# Patient Record
Sex: FEMALE | Race: BLACK OR AFRICAN AMERICAN | Employment: OTHER | URBAN - METROPOLITAN AREA
[De-identification: names, ages, dates, MRNs, and addresses within clinical notes are randomized per-mention and may not be internally consistent; named-entity substitution may affect disease eponyms.]

---

## 2018-03-19 ENCOUNTER — HOSPITAL ENCOUNTER (OUTPATIENT)
Dept: INFUSION THERAPY | Age: 73
Discharge: HOME | End: 2018-03-19
Payer: MEDICARE

## 2018-03-19 VITALS
SYSTOLIC BLOOD PRESSURE: 120 MMHG | DIASTOLIC BLOOD PRESSURE: 73 MMHG | RESPIRATION RATE: 18 BRPM | TEMPERATURE: 98.2 F | HEART RATE: 75 BPM

## 2018-03-19 LAB
ALBUMIN SERPL-MCNC: 3.6 G/DL (ref 3.5–5)
ANION GAP SERPL CALC-SCNC: 9 MMOL/L (ref 5–15)
BASOPHILS # BLD: 0 K/UL (ref 0–0.1)
BASOPHILS NFR BLD: 1 % (ref 0–1)
BUN SERPL-MCNC: 60 MG/DL (ref 6–20)
BUN/CREAT SERPL: 13 (ref 12–20)
CALCIUM SERPL-MCNC: 9 MG/DL (ref 8.5–10.1)
CHLORIDE SERPL-SCNC: 105 MMOL/L (ref 97–108)
CO2 SERPL-SCNC: 25 MMOL/L (ref 21–32)
CREAT SERPL-MCNC: 4.71 MG/DL (ref 0.55–1.02)
DIFFERENTIAL METHOD BLD: ABNORMAL
EOSINOPHIL # BLD: 0.2 K/UL (ref 0–0.4)
EOSINOPHIL NFR BLD: 4 % (ref 0–7)
ERYTHROCYTE [DISTWIDTH] IN BLOOD BY AUTOMATED COUNT: 13.2 % (ref 11.5–14.5)
GLUCOSE SERPL-MCNC: 153 MG/DL (ref 65–100)
HCT VFR BLD AUTO: 31.8 % (ref 35–47)
HGB BLD-MCNC: 9.9 G/DL (ref 11.5–16)
IMM GRANULOCYTES # BLD: 0 K/UL (ref 0–0.04)
IMM GRANULOCYTES NFR BLD AUTO: 0 % (ref 0–0.5)
IRON SATN MFR SERPL: 47 % (ref 20–50)
IRON SERPL-MCNC: 116 UG/DL (ref 35–150)
LYMPHOCYTES # BLD: 1.6 K/UL (ref 0.8–3.5)
LYMPHOCYTES NFR BLD: 32 % (ref 12–49)
MCH RBC QN AUTO: 28.5 PG (ref 26–34)
MCHC RBC AUTO-ENTMCNC: 31.1 G/DL (ref 30–36.5)
MCV RBC AUTO: 91.6 FL (ref 80–99)
MONOCYTES # BLD: 0.8 K/UL (ref 0–1)
MONOCYTES NFR BLD: 15 % (ref 5–13)
NEUTS SEG # BLD: 2.4 K/UL (ref 1.8–8)
NEUTS SEG NFR BLD: 48 % (ref 32–75)
NRBC # BLD: 0 K/UL (ref 0–0.01)
NRBC BLD-RTO: 0 PER 100 WBC
PHOSPHATE SERPL-MCNC: 4.1 MG/DL (ref 2.6–4.7)
PLATELET # BLD AUTO: 263 K/UL (ref 150–400)
PMV BLD AUTO: 9.6 FL (ref 8.9–12.9)
POTASSIUM SERPL-SCNC: 5.2 MMOL/L (ref 3.5–5.1)
RBC # BLD AUTO: 3.47 M/UL (ref 3.8–5.2)
SODIUM SERPL-SCNC: 139 MMOL/L (ref 136–145)
TIBC SERPL-MCNC: 247 UG/DL (ref 250–450)
WBC # BLD AUTO: 4.9 K/UL (ref 3.6–11)

## 2018-03-19 PROCEDURE — 83550 IRON BINDING TEST: CPT | Performed by: INTERNAL MEDICINE

## 2018-03-19 PROCEDURE — 96372 THER/PROPH/DIAG INJ SC/IM: CPT

## 2018-03-19 PROCEDURE — 85025 COMPLETE CBC W/AUTO DIFF WBC: CPT | Performed by: INTERNAL MEDICINE

## 2018-03-19 PROCEDURE — 36415 COLL VENOUS BLD VENIPUNCTURE: CPT | Performed by: INTERNAL MEDICINE

## 2018-03-19 PROCEDURE — 80069 RENAL FUNCTION PANEL: CPT | Performed by: INTERNAL MEDICINE

## 2018-03-19 PROCEDURE — 74011250636 HC RX REV CODE- 250/636: Performed by: INTERNAL MEDICINE

## 2018-03-19 RX ADMIN — ERYTHROPOIETIN 20000 UNITS: 20000 INJECTION, SOLUTION INTRAVENOUS; SUBCUTANEOUS at 14:20

## 2018-03-19 NOTE — PROGRESS NOTES
Outpatient Infusion Center Short Visit Progress Note    8691 Pt admit to [facility] for Procrit ambulatory in stable condition. Assessment completed. No new concerns voiced. Patient had last does of procrit at [facility] at the end of January. Patient Vitals for the past 12 hrs:   Temp Pulse Resp BP   03/19/18 1424 - 75 18 120/73   03/19/18 1325 98.2 °F (36.8 °C) 66 18 106/53     Recent Results (from the past 12 hour(s))   CBC WITH AUTOMATED DIFF    Collection Time: 03/19/18  1:23 PM   Result Value Ref Range    WBC 4.9 3.6 - 11.0 K/uL    RBC 3.47 (L) 3.80 - 5.20 M/uL    HGB 9.9 (L) 11.5 - 16.0 g/dL    HCT 31.8 (L) 35.0 - 47.0 %    MCV 91.6 80.0 - 99.0 FL    MCH 28.5 26.0 - 34.0 PG    MCHC 31.1 30.0 - 36.5 g/dL    RDW 13.2 11.5 - 14.5 %    PLATELET 986 336 - 126 K/uL    MPV 9.6 8.9 - 12.9 FL    NRBC 0.0 0  WBC    ABSOLUTE NRBC 0.00 0.00 - 0.01 K/uL    NEUTROPHILS 48 32 - 75 %    LYMPHOCYTES 32 12 - 49 %    MONOCYTES 15 (H) 5 - 13 %    EOSINOPHILS 4 0 - 7 %    BASOPHILS 1 0 - 1 %    IMMATURE GRANULOCYTES 0 0.0 - 0.5 %    ABS. NEUTROPHILS 2.4 1.8 - 8.0 K/UL    ABS. LYMPHOCYTES 1.6 0.8 - 3.5 K/UL    ABS. MONOCYTES 0.8 0.0 - 1.0 K/UL    ABS. EOSINOPHILS 0.2 0.0 - 0.4 K/UL    ABS. BASOPHILS 0.0 0.0 - 0.1 K/UL    ABS. IMM. GRANS. 0.0 0.00 - 0.04 K/UL    DF AUTOMATED     RENAL FUNCTION PANEL    Collection Time: 03/19/18  1:23 PM   Result Value Ref Range    Sodium 139 136 - 145 mmol/L    Potassium 5.2 (H) 3.5 - 5.1 mmol/L    Chloride 105 97 - 108 mmol/L    CO2 25 21 - 32 mmol/L    Anion gap 9 5 - 15 mmol/L    Glucose 153 (H) 65 - 100 mg/dL    BUN 60 (H) 6 - 20 MG/DL    Creatinine 4.71 (H) 0.55 - 1.02 MG/DL    BUN/Creatinine ratio 13 12 - 20      GFR est AA 11 (L) >60 ml/min/1.73m2    GFR est non-AA 9 (L) >60 ml/min/1.73m2    Calcium 9.0 8.5 - 10.1 MG/DL    Phosphorus 4.1 2.6 - 4.7 MG/DL    Albumin 3.6 3.5 - 5.0 g/dL     R arm with positive blood return.      Medications:  Procrit SQ hgb hct within treatment parameters    1425 Pt tolerated treatment well. D/c home ambulatory in no distress. Pt aware of next appointment scheduled for 4/23/18 at 1500.     Shannon Avalos RN

## 2018-04-02 ENCOUNTER — APPOINTMENT (OUTPATIENT)
Dept: INFUSION THERAPY | Age: 73
End: 2018-04-02
Payer: MEDICARE

## 2018-04-16 ENCOUNTER — APPOINTMENT (OUTPATIENT)
Dept: INFUSION THERAPY | Age: 73
End: 2018-04-16
Payer: MEDICARE

## 2018-04-30 ENCOUNTER — HOSPITAL ENCOUNTER (OUTPATIENT)
Dept: INFUSION THERAPY | Age: 73
Discharge: HOME | End: 2018-04-30
Payer: MEDICARE

## 2018-04-30 ENCOUNTER — APPOINTMENT (OUTPATIENT)
Dept: INFUSION THERAPY | Age: 73
End: 2018-04-30
Payer: MEDICARE

## 2018-04-30 VITALS
HEART RATE: 75 BPM | TEMPERATURE: 97.5 F | DIASTOLIC BLOOD PRESSURE: 81 MMHG | SYSTOLIC BLOOD PRESSURE: 119 MMHG | RESPIRATION RATE: 18 BRPM

## 2018-04-30 LAB
ALBUMIN SERPL-MCNC: 3.5 G/DL (ref 3.5–5)
ANION GAP SERPL CALC-SCNC: 7 MMOL/L (ref 5–15)
BASOPHILS # BLD: 0 K/UL (ref 0–0.1)
BASOPHILS NFR BLD: 1 % (ref 0–1)
BUN SERPL-MCNC: 43 MG/DL (ref 6–20)
BUN/CREAT SERPL: 12 (ref 12–20)
CALCIUM SERPL-MCNC: 9.1 MG/DL (ref 8.5–10.1)
CHLORIDE SERPL-SCNC: 106 MMOL/L (ref 97–108)
CO2 SERPL-SCNC: 25 MMOL/L (ref 21–32)
CREAT SERPL-MCNC: 3.71 MG/DL (ref 0.55–1.02)
DIFFERENTIAL METHOD BLD: ABNORMAL
EOSINOPHIL # BLD: 0.4 K/UL (ref 0–0.4)
EOSINOPHIL NFR BLD: 8 % (ref 0–7)
ERYTHROCYTE [DISTWIDTH] IN BLOOD BY AUTOMATED COUNT: 13.6 % (ref 11.5–14.5)
GLUCOSE SERPL-MCNC: 89 MG/DL (ref 65–100)
HCT VFR BLD AUTO: 30.4 % (ref 35–47)
HGB BLD-MCNC: 9.4 G/DL (ref 11.5–16)
IMM GRANULOCYTES # BLD: 0 K/UL (ref 0–0.04)
IMM GRANULOCYTES NFR BLD AUTO: 0 % (ref 0–0.5)
IRON SATN MFR SERPL: 46 % (ref 20–50)
IRON SERPL-MCNC: 110 UG/DL (ref 35–150)
LYMPHOCYTES # BLD: 1.3 K/UL (ref 0.8–3.5)
LYMPHOCYTES NFR BLD: 28 % (ref 12–49)
MCH RBC QN AUTO: 28.3 PG (ref 26–34)
MCHC RBC AUTO-ENTMCNC: 30.9 G/DL (ref 30–36.5)
MCV RBC AUTO: 91.6 FL (ref 80–99)
MONOCYTES # BLD: 0.6 K/UL (ref 0–1)
MONOCYTES NFR BLD: 13 % (ref 5–13)
NEUTS SEG # BLD: 2.4 K/UL (ref 1.8–8)
NEUTS SEG NFR BLD: 50 % (ref 32–75)
NRBC # BLD: 0 K/UL (ref 0–0.01)
NRBC BLD-RTO: 0 PER 100 WBC
PHOSPHATE SERPL-MCNC: 4 MG/DL (ref 2.6–4.7)
PLATELET # BLD AUTO: 258 K/UL (ref 150–400)
PMV BLD AUTO: 10 FL (ref 8.9–12.9)
POTASSIUM SERPL-SCNC: 5.4 MMOL/L (ref 3.5–5.1)
RBC # BLD AUTO: 3.32 M/UL (ref 3.8–5.2)
SODIUM SERPL-SCNC: 138 MMOL/L (ref 136–145)
TIBC SERPL-MCNC: 240 UG/DL (ref 250–450)
WBC # BLD AUTO: 4.8 K/UL (ref 3.6–11)

## 2018-04-30 PROCEDURE — 74011250636 HC RX REV CODE- 250/636: Performed by: INTERNAL MEDICINE

## 2018-04-30 PROCEDURE — 83540 ASSAY OF IRON: CPT | Performed by: INTERNAL MEDICINE

## 2018-04-30 PROCEDURE — 80069 RENAL FUNCTION PANEL: CPT | Performed by: INTERNAL MEDICINE

## 2018-04-30 PROCEDURE — 96372 THER/PROPH/DIAG INJ SC/IM: CPT

## 2018-04-30 PROCEDURE — 36415 COLL VENOUS BLD VENIPUNCTURE: CPT | Performed by: INTERNAL MEDICINE

## 2018-04-30 PROCEDURE — 85025 COMPLETE CBC W/AUTO DIFF WBC: CPT | Performed by: INTERNAL MEDICINE

## 2018-04-30 RX ORDER — LOSARTAN POTASSIUM 100 MG/1
100 TABLET ORAL DAILY
COMMUNITY

## 2018-04-30 RX ORDER — CLONAZEPAM 0.5 MG/1
0.5 TABLET ORAL
COMMUNITY

## 2018-04-30 RX ORDER — CHOLECALCIFEROL (VITAMIN D3) 125 MCG
CAPSULE ORAL
COMMUNITY

## 2018-04-30 RX ORDER — CYCLOBENZAPRINE HCL 5 MG
5 TABLET ORAL 2 TIMES DAILY
COMMUNITY

## 2018-04-30 RX ORDER — DICLOFENAC SODIUM 10 MG/G
GEL TOPICAL 4 TIMES DAILY
COMMUNITY

## 2018-04-30 RX ORDER — DULOXETIN HYDROCHLORIDE 60 MG/1
60 CAPSULE, DELAYED RELEASE ORAL DAILY
COMMUNITY

## 2018-04-30 RX ORDER — EPLERENONE 25 MG/1
25 TABLET, FILM COATED ORAL DAILY
COMMUNITY

## 2018-04-30 RX ORDER — AMLODIPINE BESYLATE 10 MG/1
10 TABLET ORAL DAILY
COMMUNITY

## 2018-04-30 RX ORDER — INSULIN ASPART 100 [IU]/ML
INJECTION, SOLUTION INTRAVENOUS; SUBCUTANEOUS
COMMUNITY

## 2018-04-30 RX ORDER — LANOLIN ALCOHOL/MO/W.PET/CERES
CREAM (GRAM) TOPICAL
COMMUNITY

## 2018-04-30 RX ORDER — HYDROCODONE BITARTRATE AND ACETAMINOPHEN 7.5; 325 MG/1; MG/1
TABLET ORAL
COMMUNITY

## 2018-04-30 RX ORDER — FUROSEMIDE 20 MG/1
TABLET ORAL DAILY
COMMUNITY

## 2018-04-30 RX ORDER — GABAPENTIN 100 MG/1
100 CAPSULE ORAL 2 TIMES DAILY
COMMUNITY

## 2018-04-30 RX ORDER — CARVEDILOL 25 MG/1
25 TABLET ORAL 2 TIMES DAILY
COMMUNITY

## 2018-04-30 RX ADMIN — ERYTHROPOIETIN 25000 UNITS: 40000 INJECTION, SOLUTION INTRAVENOUS; SUBCUTANEOUS at 16:59

## 2018-04-30 NOTE — PROGRESS NOTES
JAG received from [facility]. Procrit received from pharmacy. Reviewed labs. Procrit given in Left Arm - SQ. Patient tolerated treatment and was discharged in no distress at 1700.

## 2018-04-30 NOTE — PROGRESS NOTES
Outpatient Infusion Center Short Visit Progress Note    0151 Pt admit to [address] [place] [address] for Procrit SQ ambulatory in stable condition. Assessment completed. No new concerns voiced. Visit Vitals    /81    Pulse 75    Temp 97.5 °F (36.4 °C)    Resp 18     R Ac with positive blood return labs drawn and in process. Medications:  Ordered from pharmacy    [facility] Pt aware of next appointment scheduled for 5/29/18 at 1500.     [facility], RN

## 2018-05-14 ENCOUNTER — APPOINTMENT (OUTPATIENT)
Dept: INFUSION THERAPY | Age: 73
End: 2018-05-14
Payer: MEDICARE

## 2018-05-21 ENCOUNTER — APPOINTMENT (OUTPATIENT)
Dept: INFUSION THERAPY | Age: 73
End: 2018-05-21
Payer: MEDICARE

## 2018-05-29 ENCOUNTER — HOSPITAL ENCOUNTER (OUTPATIENT)
Dept: INFUSION THERAPY | Age: 73
Discharge: HOME | End: 2018-05-29
Payer: MEDICARE

## 2018-05-29 VITALS
SYSTOLIC BLOOD PRESSURE: 144 MMHG | TEMPERATURE: 97.5 F | RESPIRATION RATE: 18 BRPM | HEART RATE: 66 BPM | DIASTOLIC BLOOD PRESSURE: 80 MMHG

## 2018-05-29 LAB
ALBUMIN SERPL-MCNC: 3.6 G/DL (ref 3.5–5)
ANION GAP SERPL CALC-SCNC: 10 MMOL/L (ref 5–15)
BASOPHILS # BLD: 0 K/UL (ref 0–0.1)
BASOPHILS NFR BLD: 1 % (ref 0–1)
BUN SERPL-MCNC: 46 MG/DL (ref 6–20)
BUN/CREAT SERPL: 13 (ref 12–20)
CALCIUM SERPL-MCNC: 8.7 MG/DL (ref 8.5–10.1)
CALCIUM SERPL-MCNC: 9.2 MG/DL (ref 8.5–10.1)
CHLORIDE SERPL-SCNC: 104 MMOL/L (ref 97–108)
CO2 SERPL-SCNC: 25 MMOL/L (ref 21–32)
CREAT SERPL-MCNC: 3.65 MG/DL (ref 0.55–1.02)
DIFFERENTIAL METHOD BLD: ABNORMAL
EOSINOPHIL # BLD: 0.2 K/UL (ref 0–0.4)
EOSINOPHIL NFR BLD: 5 % (ref 0–7)
ERYTHROCYTE [DISTWIDTH] IN BLOOD BY AUTOMATED COUNT: 13.2 % (ref 11.5–14.5)
FERRITIN SERPL-MCNC: 271 NG/ML (ref 8–252)
GLUCOSE SERPL-MCNC: 149 MG/DL (ref 65–100)
HCT VFR BLD AUTO: 31.6 % (ref 35–47)
HGB BLD-MCNC: 9.6 G/DL (ref 11.5–16)
IMM GRANULOCYTES # BLD: 0 K/UL (ref 0–0.04)
IMM GRANULOCYTES NFR BLD AUTO: 0 % (ref 0–0.5)
IRON SATN MFR SERPL: 36 % (ref 20–50)
IRON SERPL-MCNC: 83 UG/DL (ref 35–150)
LYMPHOCYTES # BLD: 1.3 K/UL (ref 0.8–3.5)
LYMPHOCYTES NFR BLD: 28 % (ref 12–49)
MCH RBC QN AUTO: 28.5 PG (ref 26–34)
MCHC RBC AUTO-ENTMCNC: 30.4 G/DL (ref 30–36.5)
MCV RBC AUTO: 93.8 FL (ref 80–99)
MONOCYTES # BLD: 0.6 K/UL (ref 0–1)
MONOCYTES NFR BLD: 12 % (ref 5–13)
NEUTS SEG # BLD: 2.6 K/UL (ref 1.8–8)
NEUTS SEG NFR BLD: 54 % (ref 32–75)
NRBC # BLD: 0 K/UL (ref 0–0.01)
NRBC BLD-RTO: 0 PER 100 WBC
PHOSPHATE SERPL-MCNC: 3.4 MG/DL (ref 2.6–4.7)
PLATELET # BLD AUTO: 270 K/UL (ref 150–400)
PMV BLD AUTO: 9.7 FL (ref 8.9–12.9)
POTASSIUM SERPL-SCNC: 4.8 MMOL/L (ref 3.5–5.1)
PTH-INTACT SERPL-MCNC: 124.2 PG/ML (ref 18.4–88)
RBC # BLD AUTO: 3.37 M/UL (ref 3.8–5.2)
SODIUM SERPL-SCNC: 139 MMOL/L (ref 136–145)
TIBC SERPL-MCNC: 231 UG/DL (ref 250–450)
WBC # BLD AUTO: 4.7 K/UL (ref 3.6–11)

## 2018-05-29 PROCEDURE — 83540 ASSAY OF IRON: CPT | Performed by: INTERNAL MEDICINE

## 2018-05-29 PROCEDURE — 96372 THER/PROPH/DIAG INJ SC/IM: CPT

## 2018-05-29 PROCEDURE — 83970 ASSAY OF PARATHORMONE: CPT | Performed by: INTERNAL MEDICINE

## 2018-05-29 PROCEDURE — 74011250636 HC RX REV CODE- 250/636: Performed by: INTERNAL MEDICINE

## 2018-05-29 PROCEDURE — 85025 COMPLETE CBC W/AUTO DIFF WBC: CPT | Performed by: INTERNAL MEDICINE

## 2018-05-29 PROCEDURE — 82728 ASSAY OF FERRITIN: CPT | Performed by: INTERNAL MEDICINE

## 2018-05-29 PROCEDURE — 36415 COLL VENOUS BLD VENIPUNCTURE: CPT | Performed by: INTERNAL MEDICINE

## 2018-05-29 PROCEDURE — 80069 RENAL FUNCTION PANEL: CPT | Performed by: INTERNAL MEDICINE

## 2018-05-29 RX ADMIN — ERYTHROPOIETIN 25000 UNITS: 40000 INJECTION, SOLUTION INTRAVENOUS; SUBCUTANEOUS at 16:50

## 2018-05-29 NOTE — PROGRESS NOTES
Problem: Patient Education:  Go to Education Activity  Goal: Patient/Family Education  Outcome: Progressing Towards Goal  Pt here for Procrit

## 2018-05-29 NOTE — PROGRESS NOTES
[facility] VISIT NOTE    Pt arrived at [facility] ambulatory with walker and in no distress for Procrit. Assessment completed, pt c/o right shoulder pain . Patient Vitals for the past 12 hrs:   Temp Pulse Resp BP   05/29/18 1515 97.5 °F (36.4 °C) 66 18 144/80     Labs drawn from right ac without difficulty, and  within treatment parameters. Recent Results (from the past 12 hour(s))   CBC WITH AUTOMATED DIFF    Collection Time: 05/29/18  3:30 PM   Result Value Ref Range    WBC 4.7 3.6 - 11.0 K/uL    RBC 3.37 (L) 3.80 - 5.20 M/uL    HGB 9.6 (L) 11.5 - 16.0 g/dL    HCT 31.6 (L) 35.0 - 47.0 %    MCV 93.8 80.0 - 99.0 FL    MCH 28.5 26.0 - 34.0 PG    MCHC 30.4 30.0 - 36.5 g/dL    RDW 13.2 11.5 - 14.5 %    PLATELET 932 289 - 910 K/uL    MPV 9.7 8.9 - 12.9 FL    NRBC 0.0 0  WBC    ABSOLUTE NRBC 0.00 0.00 - 0.01 K/uL    NEUTROPHILS 54 32 - 75 %    LYMPHOCYTES 28 12 - 49 %    MONOCYTES 12 5 - 13 %    EOSINOPHILS 5 0 - 7 %    BASOPHILS 1 0 - 1 %    IMMATURE GRANULOCYTES 0 0.0 - 0.5 %    ABS. NEUTROPHILS 2.6 1.8 - 8.0 K/UL    ABS. LYMPHOCYTES 1.3 0.8 - 3.5 K/UL    ABS. MONOCYTES 0.6 0.0 - 1.0 K/UL    ABS. EOSINOPHILS 0.2 0.0 - 0.4 K/UL    ABS. BASOPHILS 0.0 0.0 - 0.1 K/UL    ABS. IMM. GRANS. 0.0 0.00 - 0.04 K/UL    DF AUTOMATED     RENAL FUNCTION PANEL    Collection Time: 05/29/18  3:30 PM   Result Value Ref Range    Sodium 139 136 - 145 mmol/L    Potassium 4.8 3.5 - 5.1 mmol/L    Chloride 104 97 - 108 mmol/L    CO2 25 21 - 32 mmol/L    Anion gap 10 5 - 15 mmol/L    Glucose 149 (H) 65 - 100 mg/dL    BUN 46 (H) 6 - 20 MG/DL    Creatinine 3.65 (H) 0.55 - 1.02 MG/DL    BUN/Creatinine ratio 13 12 - 20      GFR est AA 15 (L) >60 ml/min/1.73m2    GFR est non-AA 12 (L) >60 ml/min/1.73m2    Calcium 9.2 8.5 - 10.1 MG/DL    Phosphorus 3.4 2.6 - 4.7 MG/DL    Albumin 3.6 3.5 - 5.0 g/dL     Medications received:  Procrit SQ (left arm)    Tolerated treatment well, no adverse reaction noted.      D/C'd from [facility] ambulatory with walker and in no distress. Next appointment is 6/25/18 at 3:00.

## 2018-06-11 ENCOUNTER — APPOINTMENT (OUTPATIENT)
Dept: INFUSION THERAPY | Age: 73
End: 2018-06-11
Payer: MEDICARE

## 2018-06-18 ENCOUNTER — APPOINTMENT (OUTPATIENT)
Dept: INFUSION THERAPY | Age: 73
End: 2018-06-18
Payer: MEDICARE

## 2018-06-25 ENCOUNTER — HOSPITAL ENCOUNTER (OUTPATIENT)
Dept: INFUSION THERAPY | Age: 73
Discharge: HOME | End: 2018-06-25
Payer: MEDICARE

## 2018-06-25 VITALS
TEMPERATURE: 97.9 F | HEART RATE: 81 BPM | DIASTOLIC BLOOD PRESSURE: 86 MMHG | RESPIRATION RATE: 18 BRPM | SYSTOLIC BLOOD PRESSURE: 151 MMHG

## 2018-06-25 LAB
ALBUMIN SERPL-MCNC: 3.4 G/DL (ref 3.5–5)
ANION GAP SERPL CALC-SCNC: 10 MMOL/L (ref 5–15)
BASOPHILS # BLD: 0 K/UL (ref 0–0.1)
BASOPHILS NFR BLD: 1 % (ref 0–1)
BUN SERPL-MCNC: 55 MG/DL (ref 6–20)
BUN/CREAT SERPL: 13 (ref 12–20)
CALCIUM SERPL-MCNC: 8.6 MG/DL (ref 8.5–10.1)
CHLORIDE SERPL-SCNC: 105 MMOL/L (ref 97–108)
CO2 SERPL-SCNC: 23 MMOL/L (ref 21–32)
CREAT SERPL-MCNC: 4.36 MG/DL (ref 0.55–1.02)
DIFFERENTIAL METHOD BLD: ABNORMAL
EOSINOPHIL # BLD: 0.2 K/UL (ref 0–0.4)
EOSINOPHIL NFR BLD: 4 % (ref 0–7)
ERYTHROCYTE [DISTWIDTH] IN BLOOD BY AUTOMATED COUNT: 13.2 % (ref 11.5–14.5)
GLUCOSE SERPL-MCNC: 154 MG/DL (ref 65–100)
HCT VFR BLD AUTO: 31.3 % (ref 35–47)
HGB BLD-MCNC: 9.6 G/DL (ref 11.5–16)
IMM GRANULOCYTES # BLD: 0 K/UL (ref 0–0.04)
IMM GRANULOCYTES NFR BLD AUTO: 0 % (ref 0–0.5)
IRON SATN MFR SERPL: 38 % (ref 20–50)
IRON SERPL-MCNC: 83 UG/DL (ref 35–150)
LYMPHOCYTES # BLD: 1.6 K/UL (ref 0.8–3.5)
LYMPHOCYTES NFR BLD: 30 % (ref 12–49)
MCH RBC QN AUTO: 28.7 PG (ref 26–34)
MCHC RBC AUTO-ENTMCNC: 30.7 G/DL (ref 30–36.5)
MCV RBC AUTO: 93.4 FL (ref 80–99)
MONOCYTES # BLD: 0.6 K/UL (ref 0–1)
MONOCYTES NFR BLD: 11 % (ref 5–13)
NEUTS SEG # BLD: 2.8 K/UL (ref 1.8–8)
NEUTS SEG NFR BLD: 54 % (ref 32–75)
NRBC # BLD: 0 K/UL (ref 0–0.01)
NRBC BLD-RTO: 0 PER 100 WBC
PHOSPHATE SERPL-MCNC: 4.4 MG/DL (ref 2.6–4.7)
PLATELET # BLD AUTO: 270 K/UL (ref 150–400)
PMV BLD AUTO: 9.6 FL (ref 8.9–12.9)
POTASSIUM SERPL-SCNC: 5.5 MMOL/L (ref 3.5–5.1)
RBC # BLD AUTO: 3.35 M/UL (ref 3.8–5.2)
SODIUM SERPL-SCNC: 138 MMOL/L (ref 136–145)
TIBC SERPL-MCNC: 217 UG/DL (ref 250–450)
WBC # BLD AUTO: 5.3 K/UL (ref 3.6–11)

## 2018-06-25 PROCEDURE — 83540 ASSAY OF IRON: CPT | Performed by: INTERNAL MEDICINE

## 2018-06-25 PROCEDURE — 85025 COMPLETE CBC W/AUTO DIFF WBC: CPT | Performed by: INTERNAL MEDICINE

## 2018-06-25 PROCEDURE — 80069 RENAL FUNCTION PANEL: CPT | Performed by: INTERNAL MEDICINE

## 2018-06-25 PROCEDURE — 74011250636 HC RX REV CODE- 250/636: Performed by: INTERNAL MEDICINE

## 2018-06-25 PROCEDURE — 36415 COLL VENOUS BLD VENIPUNCTURE: CPT | Performed by: INTERNAL MEDICINE

## 2018-06-25 PROCEDURE — 96372 THER/PROPH/DIAG INJ SC/IM: CPT

## 2018-06-25 PROCEDURE — 74011250636 HC RX REV CODE- 250/636

## 2018-06-25 RX ADMIN — ERYTHROPOIETIN 25000 UNITS: 40000 INJECTION, SOLUTION INTRAVENOUS; SUBCUTANEOUS at 16:05

## 2018-06-25 NOTE — PROGRESS NOTES
[facility] SHORT VISIT NOTE    1520 Pt arrived to [facility] ambulatory and in no distress for Procrit. Denies any new complaints. Medication given:  Procrit SC (left arm)  Patient Vitals for the past 12 hrs:   Temp Pulse Resp BP   06/25/18 1521 97.9 °F (36.6 °C) 81 18 151/86     1610 Discharged home ambulatory and in no distress. Tolerated treatment well.  Next appointment 7/23/18 @ 1500

## 2018-06-25 NOTE — PROGRESS NOTES
Problem: Patient Education:  Go to Education Activity  Goal: Patient/Family Education  Outcome: Progressing Towards Goal  Pt receiving Procrit

## 2018-07-09 ENCOUNTER — APPOINTMENT (OUTPATIENT)
Dept: INFUSION THERAPY | Age: 73
End: 2018-07-09
Payer: MEDICARE

## 2018-07-16 ENCOUNTER — APPOINTMENT (OUTPATIENT)
Dept: INFUSION THERAPY | Age: 73
End: 2018-07-16
Payer: MEDICARE

## 2018-07-23 ENCOUNTER — HOSPITAL ENCOUNTER (OUTPATIENT)
Dept: INFUSION THERAPY | Age: 73
Discharge: HOME | End: 2018-07-23
Payer: MEDICARE

## 2018-07-23 VITALS
SYSTOLIC BLOOD PRESSURE: 151 MMHG | DIASTOLIC BLOOD PRESSURE: 76 MMHG | HEART RATE: 79 BPM | RESPIRATION RATE: 18 BRPM | TEMPERATURE: 97.2 F

## 2018-07-23 LAB
ALBUMIN SERPL-MCNC: 3.6 G/DL (ref 3.5–5)
ANION GAP SERPL CALC-SCNC: 7 MMOL/L (ref 5–15)
BASOPHILS # BLD: 0 K/UL (ref 0–0.1)
BASOPHILS NFR BLD: 1 % (ref 0–1)
BUN SERPL-MCNC: 43 MG/DL (ref 6–20)
BUN/CREAT SERPL: 11 (ref 12–20)
CALCIUM SERPL-MCNC: 8.9 MG/DL (ref 8.5–10.1)
CHLORIDE SERPL-SCNC: 107 MMOL/L (ref 97–108)
CO2 SERPL-SCNC: 25 MMOL/L (ref 21–32)
CREAT SERPL-MCNC: 3.92 MG/DL (ref 0.55–1.02)
DIFFERENTIAL METHOD BLD: ABNORMAL
EOSINOPHIL # BLD: 0.3 K/UL (ref 0–0.4)
EOSINOPHIL NFR BLD: 5 % (ref 0–7)
ERYTHROCYTE [DISTWIDTH] IN BLOOD BY AUTOMATED COUNT: 13 % (ref 11.5–14.5)
GLUCOSE SERPL-MCNC: 122 MG/DL (ref 65–100)
HCT VFR BLD AUTO: 33.3 % (ref 35–47)
HGB BLD-MCNC: 10.3 G/DL (ref 11.5–16)
IMM GRANULOCYTES # BLD: 0 K/UL (ref 0–0.04)
IMM GRANULOCYTES NFR BLD AUTO: 0 % (ref 0–0.5)
IRON SATN MFR SERPL: 50 % (ref 20–50)
IRON SERPL-MCNC: 114 UG/DL (ref 35–150)
LYMPHOCYTES # BLD: 1.4 K/UL (ref 0.8–3.5)
LYMPHOCYTES NFR BLD: 30 % (ref 12–49)
MCH RBC QN AUTO: 28.5 PG (ref 26–34)
MCHC RBC AUTO-ENTMCNC: 30.9 G/DL (ref 30–36.5)
MCV RBC AUTO: 92.2 FL (ref 80–99)
MONOCYTES # BLD: 0.5 K/UL (ref 0–1)
MONOCYTES NFR BLD: 11 % (ref 5–13)
NEUTS SEG # BLD: 2.5 K/UL (ref 1.8–8)
NEUTS SEG NFR BLD: 53 % (ref 32–75)
NRBC # BLD: 0 K/UL (ref 0–0.01)
NRBC BLD-RTO: 0 PER 100 WBC
PHOSPHATE SERPL-MCNC: 4.1 MG/DL (ref 2.6–4.7)
PLATELET # BLD AUTO: 255 K/UL (ref 150–400)
PMV BLD AUTO: 9.7 FL (ref 8.9–12.9)
POTASSIUM SERPL-SCNC: 4.7 MMOL/L (ref 3.5–5.1)
RBC # BLD AUTO: 3.61 M/UL (ref 3.8–5.2)
SODIUM SERPL-SCNC: 139 MMOL/L (ref 136–145)
TIBC SERPL-MCNC: 228 UG/DL (ref 250–450)
WBC # BLD AUTO: 4.7 K/UL (ref 3.6–11)

## 2018-07-23 PROCEDURE — 85025 COMPLETE CBC W/AUTO DIFF WBC: CPT | Performed by: INTERNAL MEDICINE

## 2018-07-23 PROCEDURE — 36415 COLL VENOUS BLD VENIPUNCTURE: CPT | Performed by: INTERNAL MEDICINE

## 2018-07-23 PROCEDURE — 80069 RENAL FUNCTION PANEL: CPT | Performed by: INTERNAL MEDICINE

## 2018-07-23 PROCEDURE — 83540 ASSAY OF IRON: CPT | Performed by: INTERNAL MEDICINE

## 2018-07-23 NOTE — PROGRESS NOTES
[facility] VISIT NOTE    1450  Pt arrived at [facility] ambulatory and in no distress for Procrit. Assessment completed, pt c/o back pain and headaches. Blood pressure 151/76, pulse 79, temperature 97.2 °F (36.2 °C), resp. rate 18, not currently breastfeeding. Labs drawn peripherally. Procrit HELD today. Recent Results (from the past 12 hour(s))   CBC WITH AUTOMATED DIFF    Collection Time: 07/23/18  2:54 PM   Result Value Ref Range    WBC 4.7 3.6 - 11.0 K/uL    RBC 3.61 (L) 3.80 - 5.20 M/uL    HGB 10.3 (L) 11.5 - 16.0 g/dL    HCT 33.3 (L) 35.0 - 47.0 %    MCV 92.2 80.0 - 99.0 FL    MCH 28.5 26.0 - 34.0 PG    MCHC 30.9 30.0 - 36.5 g/dL    RDW 13.0 11.5 - 14.5 %    PLATELET 375 496 - 043 K/uL    MPV 9.7 8.9 - 12.9 FL    NRBC 0.0 0  WBC    ABSOLUTE NRBC 0.00 0.00 - 0.01 K/uL    NEUTROPHILS 53 32 - 75 %    LYMPHOCYTES 30 12 - 49 %    MONOCYTES 11 5 - 13 %    EOSINOPHILS 5 0 - 7 %    BASOPHILS 1 0 - 1 %    IMMATURE GRANULOCYTES 0 0.0 - 0.5 %    ABS. NEUTROPHILS 2.5 1.8 - 8.0 K/UL    ABS. LYMPHOCYTES 1.4 0.8 - 3.5 K/UL    ABS. MONOCYTES 0.5 0.0 - 1.0 K/UL    ABS. EOSINOPHILS 0.3 0.0 - 0.4 K/UL    ABS. BASOPHILS 0.0 0.0 - 0.1 K/UL    ABS. IMM. GRANS. 0.0 0.00 - 0.04 K/UL    DF AUTOMATED     RENAL FUNCTION PANEL    Collection Time: 07/23/18  2:54 PM   Result Value Ref Range    Sodium 139 136 - 145 mmol/L    Potassium 4.7 3.5 - 5.1 mmol/L    Chloride 107 97 - 108 mmol/L    CO2 25 21 - 32 mmol/L    Anion gap 7 5 - 15 mmol/L    Glucose 122 (H) 65 - 100 mg/dL    BUN 43 (H) 6 - 20 MG/DL    Creatinine 3.92 (H) 0.55 - 1.02 MG/DL    BUN/Creatinine ratio 11 (L) 12 - 20      GFR est AA 14 (L) >60 ml/min/1.73m2    GFR est non-AA 11 (L) >60 ml/min/1.73m2    Calcium 8.9 8.5 - 10.1 MG/DL    Phosphorus 4.1 2.6 - 4.7 MG/DL    Albumin 3.6 3.5 - 5.0 g/dL     Tolerated treatment well, no adverse reaction noted. 1550  D/C'd from [facility] ambulatory and in no distress. Next appointment is 8/20/18 at 1500.

## 2018-08-20 ENCOUNTER — HOSPITAL ENCOUNTER (OUTPATIENT)
Dept: INFUSION THERAPY | Age: 73
Discharge: HOME | End: 2018-08-20
Payer: MEDICARE

## 2018-08-20 VITALS
WEIGHT: 233 LBS | SYSTOLIC BLOOD PRESSURE: 138 MMHG | HEART RATE: 79 BPM | DIASTOLIC BLOOD PRESSURE: 78 MMHG | RESPIRATION RATE: 18 BRPM | TEMPERATURE: 98.8 F

## 2018-08-20 LAB
ALBUMIN SERPL-MCNC: 3.5 G/DL (ref 3.5–5)
ANION GAP SERPL CALC-SCNC: 11 MMOL/L (ref 5–15)
BASOPHILS # BLD: 0 K/UL (ref 0–0.1)
BASOPHILS NFR BLD: 1 % (ref 0–1)
BUN SERPL-MCNC: 54 MG/DL (ref 6–20)
BUN/CREAT SERPL: 10 (ref 12–20)
CALCIUM SERPL-MCNC: 9.1 MG/DL (ref 8.5–10.1)
CHLORIDE SERPL-SCNC: 105 MMOL/L (ref 97–108)
CO2 SERPL-SCNC: 21 MMOL/L (ref 21–32)
CREAT SERPL-MCNC: 5.15 MG/DL (ref 0.55–1.02)
DIFFERENTIAL METHOD BLD: ABNORMAL
EOSINOPHIL # BLD: 0.2 K/UL (ref 0–0.4)
EOSINOPHIL NFR BLD: 5 % (ref 0–7)
ERYTHROCYTE [DISTWIDTH] IN BLOOD BY AUTOMATED COUNT: 13.1 % (ref 11.5–14.5)
GLUCOSE SERPL-MCNC: 158 MG/DL (ref 65–100)
HCT VFR BLD AUTO: 28.6 % (ref 35–47)
HGB BLD-MCNC: 9 G/DL (ref 11.5–16)
IMM GRANULOCYTES # BLD: 0 K/UL (ref 0–0.04)
IMM GRANULOCYTES NFR BLD AUTO: 0 % (ref 0–0.5)
IRON SATN MFR SERPL: 40 % (ref 20–50)
IRON SERPL-MCNC: 92 UG/DL (ref 35–150)
LYMPHOCYTES # BLD: 1.2 K/UL (ref 0.8–3.5)
LYMPHOCYTES NFR BLD: 30 % (ref 12–49)
MCH RBC QN AUTO: 28.7 PG (ref 26–34)
MCHC RBC AUTO-ENTMCNC: 31.5 G/DL (ref 30–36.5)
MCV RBC AUTO: 91.1 FL (ref 80–99)
MONOCYTES # BLD: 0.7 K/UL (ref 0–1)
MONOCYTES NFR BLD: 16 % (ref 5–13)
NEUTS SEG # BLD: 2 K/UL (ref 1.8–8)
NEUTS SEG NFR BLD: 49 % (ref 32–75)
NRBC # BLD: 0 K/UL (ref 0–0.01)
NRBC BLD-RTO: 0 PER 100 WBC
PHOSPHATE SERPL-MCNC: 4.1 MG/DL (ref 2.6–4.7)
PLATELET # BLD AUTO: 216 K/UL (ref 150–400)
PMV BLD AUTO: 9.9 FL (ref 8.9–12.9)
POTASSIUM SERPL-SCNC: 5.1 MMOL/L (ref 3.5–5.1)
RBC # BLD AUTO: 3.14 M/UL (ref 3.8–5.2)
SODIUM SERPL-SCNC: 137 MMOL/L (ref 136–145)
TIBC SERPL-MCNC: 229 UG/DL (ref 250–450)
WBC # BLD AUTO: 4 K/UL (ref 3.6–11)

## 2018-08-20 PROCEDURE — 85025 COMPLETE CBC W/AUTO DIFF WBC: CPT | Performed by: INTERNAL MEDICINE

## 2018-08-20 PROCEDURE — 96372 THER/PROPH/DIAG INJ SC/IM: CPT

## 2018-08-20 PROCEDURE — 80069 RENAL FUNCTION PANEL: CPT | Performed by: INTERNAL MEDICINE

## 2018-08-20 PROCEDURE — 83540 ASSAY OF IRON: CPT | Performed by: INTERNAL MEDICINE

## 2018-08-20 PROCEDURE — 36415 COLL VENOUS BLD VENIPUNCTURE: CPT | Performed by: INTERNAL MEDICINE

## 2018-08-20 PROCEDURE — 74011250636 HC RX REV CODE- 250/636: Performed by: INTERNAL MEDICINE

## 2018-08-20 RX ADMIN — ERYTHROPOIETIN 25000 UNITS: 40000 INJECTION, SOLUTION INTRAVENOUS; SUBCUTANEOUS at 16:11

## 2018-08-20 NOTE — PROGRESS NOTES
[facility] VISIT NOTE    3986  Pt arrived at [facility] ambulatory and in no distress for Procrit. Assessment completed, pt w/o complaints. Labs drawn peripherally    Medications received:  Procrit SQ    Tolerated treatment well, no adverse reaction noted. Patient Vitals for the past 12 hrs:   Temp Pulse Resp BP   08/20/18 1511 98.8 °F (37.1 °C) 79 18 138/78     Recent Results (from the past 12 hour(s))   CBC WITH AUTOMATED DIFF    Collection Time: 08/20/18  3:18 PM   Result Value Ref Range    WBC 4.0 3.6 - 11.0 K/uL    RBC 3.14 (L) 3.80 - 5.20 M/uL    HGB 9.0 (L) 11.5 - 16.0 g/dL    HCT 28.6 (L) 35.0 - 47.0 %    MCV 91.1 80.0 - 99.0 FL    MCH 28.7 26.0 - 34.0 PG    MCHC 31.5 30.0 - 36.5 g/dL    RDW 13.1 11.5 - 14.5 %    PLATELET 372 792 - 019 K/uL    MPV 9.9 8.9 - 12.9 FL    NRBC 0.0 0  WBC    ABSOLUTE NRBC 0.00 0.00 - 0.01 K/uL    NEUTROPHILS 49 32 - 75 %    LYMPHOCYTES 30 12 - 49 %    MONOCYTES 16 (H) 5 - 13 %    EOSINOPHILS 5 0 - 7 %    BASOPHILS 1 0 - 1 %    IMMATURE GRANULOCYTES 0 0.0 - 0.5 %    ABS. NEUTROPHILS 2.0 1.8 - 8.0 K/UL    ABS. LYMPHOCYTES 1.2 0.8 - 3.5 K/UL    ABS. MONOCYTES 0.7 0.0 - 1.0 K/UL    ABS. EOSINOPHILS 0.2 0.0 - 0.4 K/UL    ABS. BASOPHILS 0.0 0.0 - 0.1 K/UL    ABS. IMM. GRANS. 0.0 0.00 - 0.04 K/UL    DF AUTOMATED       1615  D/C'd from [facility] ambulatory and in no distress accompanied by self.  Next appointment is 9/17/18 at 300pm.

## 2018-09-17 ENCOUNTER — HOSPITAL ENCOUNTER (OUTPATIENT)
Dept: INFUSION THERAPY | Age: 73
Discharge: HOME | End: 2018-09-17
Payer: MEDICARE

## 2018-09-17 VITALS
TEMPERATURE: 98 F | DIASTOLIC BLOOD PRESSURE: 63 MMHG | HEART RATE: 77 BPM | RESPIRATION RATE: 18 BRPM | SYSTOLIC BLOOD PRESSURE: 145 MMHG

## 2018-09-17 LAB
ALBUMIN SERPL-MCNC: 3.6 G/DL (ref 3.5–5)
ANION GAP SERPL CALC-SCNC: 8 MMOL/L (ref 5–15)
BASOPHILS # BLD: 0 K/UL (ref 0–0.1)
BASOPHILS NFR BLD: 1 % (ref 0–1)
BUN SERPL-MCNC: 55 MG/DL (ref 6–20)
BUN/CREAT SERPL: 13 (ref 12–20)
CALCIUM SERPL-MCNC: 8.9 MG/DL (ref 8.5–10.1)
CHLORIDE SERPL-SCNC: 104 MMOL/L (ref 97–108)
CO2 SERPL-SCNC: 23 MMOL/L (ref 21–32)
CREAT SERPL-MCNC: 4.19 MG/DL (ref 0.55–1.02)
DIFFERENTIAL METHOD BLD: ABNORMAL
EOSINOPHIL # BLD: 0.2 K/UL (ref 0–0.4)
EOSINOPHIL NFR BLD: 5 % (ref 0–7)
ERYTHROCYTE [DISTWIDTH] IN BLOOD BY AUTOMATED COUNT: 13 % (ref 11.5–14.5)
GLUCOSE SERPL-MCNC: 168 MG/DL (ref 65–100)
HCT VFR BLD AUTO: 31.2 % (ref 35–47)
HGB BLD-MCNC: 9.7 G/DL (ref 11.5–16)
IMM GRANULOCYTES # BLD: 0 K/UL (ref 0–0.04)
IMM GRANULOCYTES NFR BLD AUTO: 0 % (ref 0–0.5)
IRON SATN MFR SERPL: 43 % (ref 20–50)
IRON SERPL-MCNC: 98 UG/DL (ref 35–150)
LYMPHOCYTES # BLD: 1.3 K/UL (ref 0.8–3.5)
LYMPHOCYTES NFR BLD: 31 % (ref 12–49)
MCH RBC QN AUTO: 28.8 PG (ref 26–34)
MCHC RBC AUTO-ENTMCNC: 31.1 G/DL (ref 30–36.5)
MCV RBC AUTO: 92.6 FL (ref 80–99)
MONOCYTES # BLD: 0.5 K/UL (ref 0–1)
MONOCYTES NFR BLD: 12 % (ref 5–13)
NEUTS SEG # BLD: 2.1 K/UL (ref 1.8–8)
NEUTS SEG NFR BLD: 51 % (ref 32–75)
NRBC # BLD: 0 K/UL (ref 0–0.01)
NRBC BLD-RTO: 0 PER 100 WBC
PHOSPHATE SERPL-MCNC: 4.2 MG/DL (ref 2.6–4.7)
PLATELET # BLD AUTO: 251 K/UL (ref 150–400)
PMV BLD AUTO: 9.7 FL (ref 8.9–12.9)
POTASSIUM SERPL-SCNC: 5.5 MMOL/L (ref 3.5–5.1)
RBC # BLD AUTO: 3.37 M/UL (ref 3.8–5.2)
SODIUM SERPL-SCNC: 135 MMOL/L (ref 136–145)
TIBC SERPL-MCNC: 230 UG/DL (ref 250–450)
WBC # BLD AUTO: 4.1 K/UL (ref 3.6–11)

## 2018-09-17 PROCEDURE — 83540 ASSAY OF IRON: CPT | Performed by: INTERNAL MEDICINE

## 2018-09-17 PROCEDURE — 74011250636 HC RX REV CODE- 250/636: Performed by: INTERNAL MEDICINE

## 2018-09-17 PROCEDURE — 96372 THER/PROPH/DIAG INJ SC/IM: CPT

## 2018-09-17 PROCEDURE — 36415 COLL VENOUS BLD VENIPUNCTURE: CPT | Performed by: INTERNAL MEDICINE

## 2018-09-17 PROCEDURE — 85025 COMPLETE CBC W/AUTO DIFF WBC: CPT | Performed by: INTERNAL MEDICINE

## 2018-09-17 PROCEDURE — 80069 RENAL FUNCTION PANEL: CPT | Performed by: INTERNAL MEDICINE

## 2018-09-17 RX ADMIN — ERYTHROPOIETIN 25000 UNITS: 40000 INJECTION, SOLUTION INTRAVENOUS; SUBCUTANEOUS at 16:22

## 2018-09-17 NOTE — PROGRESS NOTES
Outpatient Infusion Center Short Visit Progress Note 1500 Patient admitted to [facility] for labs/procrit ambulatory in stable condition. Assessment completed. No new concerns voiced. Visit Vitals  /63  Pulse 77  Temp 98 °F (36.7 °C)  Resp 18  Breastfeeding No  
 
Recent Results (from the past 12 hour(s)) CBC WITH AUTOMATED DIFF Collection Time: 09/17/18  3:18 PM  
Result Value Ref Range WBC 4.1 3.6 - 11.0 K/uL  
 RBC 3.37 (L) 3.80 - 5.20 M/uL HGB 9.7 (L) 11.5 - 16.0 g/dL HCT 31.2 (L) 35.0 - 47.0 % MCV 92.6 80.0 - 99.0 FL  
 MCH 28.8 26.0 - 34.0 PG  
 MCHC 31.1 30.0 - 36.5 g/dL  
 RDW 13.0 11.5 - 14.5 % PLATELET 326 407 - 248 K/uL MPV 9.7 8.9 - 12.9 FL  
 NRBC 0.0 0  WBC ABSOLUTE NRBC 0.00 0.00 - 0.01 K/uL NEUTROPHILS 51 32 - 75 % LYMPHOCYTES 31 12 - 49 % MONOCYTES 12 5 - 13 % EOSINOPHILS 5 0 - 7 % BASOPHILS 1 0 - 1 % IMMATURE GRANULOCYTES 0 0.0 - 0.5 % ABS. NEUTROPHILS 2.1 1.8 - 8.0 K/UL  
 ABS. LYMPHOCYTES 1.3 0.8 - 3.5 K/UL  
 ABS. MONOCYTES 0.5 0.0 - 1.0 K/UL  
 ABS. EOSINOPHILS 0.2 0.0 - 0.4 K/UL  
 ABS. BASOPHILS 0.0 0.0 - 0.1 K/UL  
 ABS. IMM. GRANS. 0.0 0.00 - 0.04 K/UL  
 DF AUTOMATED RENAL FUNCTION PANEL Collection Time: 09/17/18  3:18 PM  
Result Value Ref Range Sodium 135 (L) 136 - 145 mmol/L Potassium 5.5 (H) 3.5 - 5.1 mmol/L Chloride 104 97 - 108 mmol/L  
 CO2 23 21 - 32 mmol/L Anion gap 8 5 - 15 mmol/L Glucose 168 (H) 65 - 100 mg/dL BUN 55 (H) 6 - 20 MG/DL Creatinine 4.19 (H) 0.55 - 1.02 MG/DL  
 BUN/Creatinine ratio 13 12 - 20 GFR est AA 13 (L) >60 ml/min/1.73m2 GFR est non-AA 10 (L) >60 ml/min/1.73m2 Calcium 8.9 8.5 - 10.1 MG/DL Phosphorus 4.2 2.6 - 4.7 MG/DL Albumin 3.6 3.5 - 5.0 g/dL Medications: 
Procrit 1630* Patient tolerated treatment well. Patient discharged from [facility] 58 ambulatory in no distress at 1630.  Patient aware of next appointment scheduled for  10/15/18 @ 3 pm

## 2018-10-15 ENCOUNTER — HOSPITAL ENCOUNTER (OUTPATIENT)
Dept: INFUSION THERAPY | Age: 73
Discharge: HOME | End: 2018-10-15
Payer: MEDICARE

## 2018-10-15 VITALS
TEMPERATURE: 98.4 F | SYSTOLIC BLOOD PRESSURE: 139 MMHG | DIASTOLIC BLOOD PRESSURE: 77 MMHG | HEART RATE: 80 BPM | RESPIRATION RATE: 20 BRPM

## 2018-10-15 LAB
ALBUMIN SERPL-MCNC: 3.5 G/DL (ref 3.5–5)
ANION GAP SERPL CALC-SCNC: 8 MMOL/L (ref 5–15)
BASOPHILS # BLD: 0.1 K/UL (ref 0–0.1)
BASOPHILS NFR BLD: 1 % (ref 0–1)
BUN SERPL-MCNC: 54 MG/DL (ref 6–20)
BUN/CREAT SERPL: 12 (ref 12–20)
CALCIUM SERPL-MCNC: 9.4 MG/DL (ref 8.5–10.1)
CHLORIDE SERPL-SCNC: 106 MMOL/L (ref 97–108)
CO2 SERPL-SCNC: 24 MMOL/L (ref 21–32)
CREAT SERPL-MCNC: 4.33 MG/DL (ref 0.55–1.02)
DIFFERENTIAL METHOD BLD: ABNORMAL
EOSINOPHIL # BLD: 0.2 K/UL (ref 0–0.4)
EOSINOPHIL NFR BLD: 5 % (ref 0–7)
ERYTHROCYTE [DISTWIDTH] IN BLOOD BY AUTOMATED COUNT: 13.2 % (ref 11.5–14.5)
GLUCOSE SERPL-MCNC: 152 MG/DL (ref 65–100)
HCT VFR BLD AUTO: 31 % (ref 35–47)
HGB BLD-MCNC: 9.8 G/DL (ref 11.5–16)
IMM GRANULOCYTES # BLD: 0 K/UL (ref 0–0.04)
IMM GRANULOCYTES NFR BLD AUTO: 0 % (ref 0–0.5)
IRON SATN MFR SERPL: 42 % (ref 20–50)
IRON SERPL-MCNC: 97 UG/DL (ref 35–150)
LYMPHOCYTES # BLD: 1.3 K/UL (ref 0.8–3.5)
LYMPHOCYTES NFR BLD: 31 % (ref 12–49)
MCH RBC QN AUTO: 29 PG (ref 26–34)
MCHC RBC AUTO-ENTMCNC: 31.6 G/DL (ref 30–36.5)
MCV RBC AUTO: 91.7 FL (ref 80–99)
MONOCYTES # BLD: 0.5 K/UL (ref 0–1)
MONOCYTES NFR BLD: 12 % (ref 5–13)
NEUTS SEG # BLD: 2.1 K/UL (ref 1.8–8)
NEUTS SEG NFR BLD: 51 % (ref 32–75)
NRBC # BLD: 0 K/UL (ref 0–0.01)
NRBC BLD-RTO: 0 PER 100 WBC
PHOSPHATE SERPL-MCNC: 3.9 MG/DL (ref 2.6–4.7)
PLATELET # BLD AUTO: 262 K/UL (ref 150–400)
PMV BLD AUTO: 9.5 FL (ref 8.9–12.9)
POTASSIUM SERPL-SCNC: 5.8 MMOL/L (ref 3.5–5.1)
RBC # BLD AUTO: 3.38 M/UL (ref 3.8–5.2)
SODIUM SERPL-SCNC: 138 MMOL/L (ref 136–145)
TIBC SERPL-MCNC: 229 UG/DL (ref 250–450)
WBC # BLD AUTO: 4.2 K/UL (ref 3.6–11)

## 2018-10-15 PROCEDURE — 80069 RENAL FUNCTION PANEL: CPT | Performed by: INTERNAL MEDICINE

## 2018-10-15 PROCEDURE — 83540 ASSAY OF IRON: CPT | Performed by: INTERNAL MEDICINE

## 2018-10-15 PROCEDURE — 74011250636 HC RX REV CODE- 250/636

## 2018-10-15 PROCEDURE — 85025 COMPLETE CBC W/AUTO DIFF WBC: CPT | Performed by: INTERNAL MEDICINE

## 2018-10-15 PROCEDURE — 96372 THER/PROPH/DIAG INJ SC/IM: CPT

## 2018-10-15 PROCEDURE — 36415 COLL VENOUS BLD VENIPUNCTURE: CPT | Performed by: INTERNAL MEDICINE

## 2018-10-15 RX ADMIN — ERYTHROPOIETIN 25000 UNITS: 40000 INJECTION, SOLUTION INTRAVENOUS; SUBCUTANEOUS at 16:03

## 2018-10-15 NOTE — PROGRESS NOTES
[facility] SHORT VISIT NOTE 
 
1520 Pt arrived to [facility] ambulatory with walker and in no distress for Procrit. Assessment completed, patient notes back and knee pain today rated at 6/10 on numeric pain scale. Blood pressure 139/77, pulse 80, temperature 98.4 °F (36.9 °C), resp. rate 20, not currently breastfeeding. Labs drawn peripherally as ordered. Recent Results (from the past 12 hour(s)) CBC WITH AUTOMATED DIFF Collection Time: 10/15/18  3:28 PM  
Result Value Ref Range WBC 4.2 3.6 - 11.0 K/uL  
 RBC 3.38 (L) 3.80 - 5.20 M/uL HGB 9.8 (L) 11.5 - 16.0 g/dL HCT 31.0 (L) 35.0 - 47.0 % MCV 91.7 80.0 - 99.0 FL  
 MCH 29.0 26.0 - 34.0 PG  
 MCHC 31.6 30.0 - 36.5 g/dL  
 RDW 13.2 11.5 - 14.5 % PLATELET 465 143 - 811 K/uL MPV 9.5 8.9 - 12.9 FL  
 NRBC 0.0 0  WBC ABSOLUTE NRBC 0.00 0.00 - 0.01 K/uL NEUTROPHILS 51 32 - 75 % LYMPHOCYTES 31 12 - 49 % MONOCYTES 12 5 - 13 % EOSINOPHILS 5 0 - 7 % BASOPHILS 1 0 - 1 % IMMATURE GRANULOCYTES 0 0.0 - 0.5 % ABS. NEUTROPHILS 2.1 1.8 - 8.0 K/UL  
 ABS. LYMPHOCYTES 1.3 0.8 - 3.5 K/UL  
 ABS. MONOCYTES 0.5 0.0 - 1.0 K/UL  
 ABS. EOSINOPHILS 0.2 0.0 - 0.4 K/UL  
 ABS. BASOPHILS 0.1 0.0 - 0.1 K/UL  
 ABS. IMM. GRANS. 0.0 0.00 - 0.04 K/UL  
 DF AUTOMATED Medication given: 
Procrit SQ 
 
1610 Discharged home ambulatory with walker and in no distress. Tolerated treatment well. Next appointment 11/12/18 at 1500.

## 2018-11-12 ENCOUNTER — HOSPITAL ENCOUNTER (OUTPATIENT)
Dept: INFUSION THERAPY | Age: 73
Discharge: HOME | End: 2018-11-12
Payer: MEDICARE

## 2018-11-12 VITALS
SYSTOLIC BLOOD PRESSURE: 150 MMHG | TEMPERATURE: 96.8 F | RESPIRATION RATE: 20 BRPM | DIASTOLIC BLOOD PRESSURE: 69 MMHG | HEART RATE: 83 BPM

## 2018-11-12 LAB
ALBUMIN SERPL-MCNC: 3.5 G/DL (ref 3.5–5)
ANION GAP SERPL CALC-SCNC: 10 MMOL/L (ref 5–15)
BASOPHILS # BLD: 0 K/UL (ref 0–0.1)
BASOPHILS NFR BLD: 1 % (ref 0–1)
BUN SERPL-MCNC: 49 MG/DL (ref 6–20)
BUN/CREAT SERPL: 13 (ref 12–20)
CALCIUM SERPL-MCNC: 8.6 MG/DL (ref 8.5–10.1)
CALCIUM SERPL-MCNC: 8.8 MG/DL (ref 8.5–10.1)
CHLORIDE SERPL-SCNC: 106 MMOL/L (ref 97–108)
CO2 SERPL-SCNC: 23 MMOL/L (ref 21–32)
CREAT SERPL-MCNC: 3.82 MG/DL (ref 0.55–1.02)
DIFFERENTIAL METHOD BLD: ABNORMAL
EOSINOPHIL # BLD: 0.2 K/UL (ref 0–0.4)
EOSINOPHIL NFR BLD: 5 % (ref 0–7)
ERYTHROCYTE [DISTWIDTH] IN BLOOD BY AUTOMATED COUNT: 13.2 % (ref 11.5–14.5)
FERRITIN SERPL-MCNC: 257 NG/ML (ref 8–252)
GLUCOSE SERPL-MCNC: 168 MG/DL (ref 65–100)
HCT VFR BLD AUTO: 31.7 % (ref 35–47)
HGB BLD-MCNC: 9.9 G/DL (ref 11.5–16)
IMM GRANULOCYTES # BLD: 0 K/UL (ref 0–0.04)
IMM GRANULOCYTES NFR BLD AUTO: 0 % (ref 0–0.5)
IRON SATN MFR SERPL: 40 % (ref 20–50)
IRON SERPL-MCNC: 87 UG/DL (ref 35–150)
LYMPHOCYTES # BLD: 1.2 K/UL (ref 0.8–3.5)
LYMPHOCYTES NFR BLD: 33 % (ref 12–49)
MCH RBC QN AUTO: 28.9 PG (ref 26–34)
MCHC RBC AUTO-ENTMCNC: 31.2 G/DL (ref 30–36.5)
MCV RBC AUTO: 92.7 FL (ref 80–99)
MONOCYTES # BLD: 0.5 K/UL (ref 0–1)
MONOCYTES NFR BLD: 13 % (ref 5–13)
NEUTS SEG # BLD: 1.8 K/UL (ref 1.8–8)
NEUTS SEG NFR BLD: 48 % (ref 32–75)
NRBC # BLD: 0 K/UL (ref 0–0.01)
NRBC BLD-RTO: 0 PER 100 WBC
PHOSPHATE SERPL-MCNC: 4.4 MG/DL (ref 2.6–4.7)
PLATELET # BLD AUTO: 252 K/UL (ref 150–400)
PMV BLD AUTO: 9.5 FL (ref 8.9–12.9)
POTASSIUM SERPL-SCNC: 5.4 MMOL/L (ref 3.5–5.1)
PTH-INTACT SERPL-MCNC: 158.2 PG/ML (ref 18.4–88)
RBC # BLD AUTO: 3.42 M/UL (ref 3.8–5.2)
SODIUM SERPL-SCNC: 139 MMOL/L (ref 136–145)
TIBC SERPL-MCNC: 215 UG/DL (ref 250–450)
WBC # BLD AUTO: 3.8 K/UL (ref 3.6–11)

## 2018-11-12 PROCEDURE — 36415 COLL VENOUS BLD VENIPUNCTURE: CPT

## 2018-11-12 PROCEDURE — 74011250636 HC RX REV CODE- 250/636: Performed by: INTERNAL MEDICINE

## 2018-11-12 PROCEDURE — 82728 ASSAY OF FERRITIN: CPT

## 2018-11-12 PROCEDURE — 83540 ASSAY OF IRON: CPT

## 2018-11-12 PROCEDURE — 83970 ASSAY OF PARATHORMONE: CPT

## 2018-11-12 PROCEDURE — 96372 THER/PROPH/DIAG INJ SC/IM: CPT

## 2018-11-12 PROCEDURE — 80069 RENAL FUNCTION PANEL: CPT

## 2018-11-12 PROCEDURE — 85025 COMPLETE CBC W/AUTO DIFF WBC: CPT

## 2018-11-12 RX ADMIN — ERYTHROPOIETIN 25000 UNITS: 40000 INJECTION, SOLUTION INTRAVENOUS; SUBCUTANEOUS at 15:56

## 2018-11-12 NOTE — PROGRESS NOTES
Problem: Patient Education:  Go to Education Activity Goal: Patient/Family Education Outcome: Progressing Towards Goal 
Arthritis

## 2018-11-12 NOTE — PROGRESS NOTES
[facility] Outpatient Infusion Center Note: 
1600Pt arrived at [facility] ambulatory and in no distress for lab and procrit Assessment stable, no new complaints voiced. Medications received: 
Procrit  In left arm 
 
1600 Tolerated treatment well, no adverse reaction noted. D/Cd from [facility] ambulatory and in no distress accompanied by self. Next appt 12/10  1500 Visit Vitals /69 Pulse 83 Temp 96.8 °F (36 °C) Resp 20 Recent Results (from the past 12 hour(s)) CBC WITH AUTOMATED DIFF Collection Time: 11/12/18  2:49 PM  
Result Value Ref Range WBC 3.8 3.6 - 11.0 K/uL  
 RBC 3.42 (L) 3.80 - 5.20 M/uL HGB 9.9 (L) 11.5 - 16.0 g/dL HCT 31.7 (L) 35.0 - 47.0 % MCV 92.7 80.0 - 99.0 FL  
 MCH 28.9 26.0 - 34.0 PG  
 MCHC 31.2 30.0 - 36.5 g/dL  
 RDW 13.2 11.5 - 14.5 % PLATELET 391 120 - 321 K/uL MPV 9.5 8.9 - 12.9 FL  
 NRBC 0.0 0  WBC ABSOLUTE NRBC 0.00 0.00 - 0.01 K/uL NEUTROPHILS 48 32 - 75 % LYMPHOCYTES 33 12 - 49 % MONOCYTES 13 5 - 13 % EOSINOPHILS 5 0 - 7 % BASOPHILS 1 0 - 1 % IMMATURE GRANULOCYTES 0 0.0 - 0.5 % ABS. NEUTROPHILS 1.8 1.8 - 8.0 K/UL  
 ABS. LYMPHOCYTES 1.2 0.8 - 3.5 K/UL  
 ABS. MONOCYTES 0.5 0.0 - 1.0 K/UL  
 ABS. EOSINOPHILS 0.2 0.0 - 0.4 K/UL  
 ABS. BASOPHILS 0.0 0.0 - 0.1 K/UL  
 ABS. IMM. GRANS. 0.0 0.00 - 0.04 K/UL  
 DF AUTOMATED

## 2018-12-10 ENCOUNTER — HOSPITAL ENCOUNTER (OUTPATIENT)
Dept: INFUSION THERAPY | Age: 73
Discharge: HOME | End: 2018-12-10
Payer: MEDICARE

## 2018-12-14 ENCOUNTER — HOSPITAL ENCOUNTER (OUTPATIENT)
Dept: INFUSION THERAPY | Age: 73
Discharge: HOME | End: 2018-12-14
Payer: MEDICARE

## 2018-12-14 VITALS
RESPIRATION RATE: 18 BRPM | SYSTOLIC BLOOD PRESSURE: 145 MMHG | TEMPERATURE: 97 F | HEART RATE: 88 BPM | DIASTOLIC BLOOD PRESSURE: 70 MMHG

## 2018-12-14 LAB
ALBUMIN SERPL-MCNC: 3.7 G/DL (ref 3.5–5)
ANION GAP SERPL CALC-SCNC: 10 MMOL/L (ref 5–15)
BASOPHILS # BLD: 0 K/UL (ref 0–0.1)
BASOPHILS NFR BLD: 1 % (ref 0–1)
BUN SERPL-MCNC: 58 MG/DL (ref 6–20)
BUN/CREAT SERPL: 12 (ref 12–20)
CALCIUM SERPL-MCNC: 9.2 MG/DL (ref 8.5–10.1)
CHLORIDE SERPL-SCNC: 104 MMOL/L (ref 97–108)
CO2 SERPL-SCNC: 23 MMOL/L (ref 21–32)
CREAT SERPL-MCNC: 4.85 MG/DL (ref 0.55–1.02)
DIFFERENTIAL METHOD BLD: ABNORMAL
EOSINOPHIL # BLD: 0.2 K/UL (ref 0–0.4)
EOSINOPHIL NFR BLD: 5 % (ref 0–7)
ERYTHROCYTE [DISTWIDTH] IN BLOOD BY AUTOMATED COUNT: 13.2 % (ref 11.5–14.5)
GLUCOSE SERPL-MCNC: 179 MG/DL (ref 65–100)
HCT VFR BLD AUTO: 34.4 % (ref 35–47)
HGB BLD-MCNC: 10.7 G/DL (ref 11.5–16)
IMM GRANULOCYTES # BLD: 0 K/UL (ref 0–0.04)
IMM GRANULOCYTES NFR BLD AUTO: 0 % (ref 0–0.5)
IRON SATN MFR SERPL: 50 % (ref 20–50)
IRON SERPL-MCNC: 112 UG/DL (ref 35–150)
LYMPHOCYTES # BLD: 1.4 K/UL (ref 0.8–3.5)
LYMPHOCYTES NFR BLD: 35 % (ref 12–49)
MCH RBC QN AUTO: 28.4 PG (ref 26–34)
MCHC RBC AUTO-ENTMCNC: 31.1 G/DL (ref 30–36.5)
MCV RBC AUTO: 91.2 FL (ref 80–99)
MONOCYTES # BLD: 0.5 K/UL (ref 0–1)
MONOCYTES NFR BLD: 13 % (ref 5–13)
NEUTS SEG # BLD: 1.9 K/UL (ref 1.8–8)
NEUTS SEG NFR BLD: 46 % (ref 32–75)
NRBC # BLD: 0 K/UL (ref 0–0.01)
NRBC BLD-RTO: 0 PER 100 WBC
PHOSPHATE SERPL-MCNC: 4.4 MG/DL (ref 2.6–4.7)
PLATELET # BLD AUTO: 269 K/UL (ref 150–400)
PMV BLD AUTO: 9.6 FL (ref 8.9–12.9)
POTASSIUM SERPL-SCNC: 5.9 MMOL/L (ref 3.5–5.1)
RBC # BLD AUTO: 3.77 M/UL (ref 3.8–5.2)
SODIUM SERPL-SCNC: 137 MMOL/L (ref 136–145)
TIBC SERPL-MCNC: 224 UG/DL (ref 250–450)
WBC # BLD AUTO: 4.1 K/UL (ref 3.6–11)

## 2018-12-14 PROCEDURE — 36415 COLL VENOUS BLD VENIPUNCTURE: CPT

## 2018-12-14 PROCEDURE — 85025 COMPLETE CBC W/AUTO DIFF WBC: CPT

## 2018-12-14 PROCEDURE — 83540 ASSAY OF IRON: CPT

## 2018-12-14 PROCEDURE — 80069 RENAL FUNCTION PANEL: CPT

## 2018-12-14 NOTE — PROGRESS NOTES
Outpatient Infusion Center Short Visit Progress Note    8530 Patient admitted to [facility] for labs/procrit ambulatory in stable condition. Assessment completed. No new concerns voiced. Visit Vitals  /70   Pulse 88   Temp 97 °F (36.1 °C)   Resp 18   Breastfeeding? No       Recent Results (from the past 12 hour(s))   CBC WITH AUTOMATED DIFF    Collection Time: 12/14/18  2:35 PM   Result Value Ref Range    WBC 4.1 3.6 - 11.0 K/uL    RBC 3.77 (L) 3.80 - 5.20 M/uL    HGB 10.7 (L) 11.5 - 16.0 g/dL    HCT 34.4 (L) 35.0 - 47.0 %    MCV 91.2 80.0 - 99.0 FL    MCH 28.4 26.0 - 34.0 PG    MCHC 31.1 30.0 - 36.5 g/dL    RDW 13.2 11.5 - 14.5 %    PLATELET 654 209 - 359 K/uL    MPV 9.6 8.9 - 12.9 FL    NRBC 0.0 0  WBC    ABSOLUTE NRBC 0.00 0.00 - 0.01 K/uL    NEUTROPHILS 46 32 - 75 %    LYMPHOCYTES 35 12 - 49 %    MONOCYTES 13 5 - 13 %    EOSINOPHILS 5 0 - 7 %    BASOPHILS 1 0 - 1 %    IMMATURE GRANULOCYTES 0 0.0 - 0.5 %    ABS. NEUTROPHILS 1.9 1.8 - 8.0 K/UL    ABS. LYMPHOCYTES 1.4 0.8 - 3.5 K/UL    ABS. MONOCYTES 0.5 0.0 - 1.0 K/UL    ABS. EOSINOPHILS 0.2 0.0 - 0.4 K/UL    ABS. BASOPHILS 0.0 0.0 - 0.1 K/UL    ABS. IMM. GRANS. 0.0 0.00 - 0.04 K/UL    DF AUTOMATED     RENAL FUNCTION PANEL    Collection Time: 12/14/18  2:35 PM   Result Value Ref Range    Sodium 137 136 - 145 mmol/L    Potassium 5.9 (H) 3.5 - 5.1 mmol/L    Chloride 104 97 - 108 mmol/L    CO2 23 21 - 32 mmol/L    Anion gap 10 5 - 15 mmol/L    Glucose 179 (H) 65 - 100 mg/dL    BUN 58 (H) 6 - 20 MG/DL    Creatinine 4.85 (H) 0.55 - 1.02 MG/DL    BUN/Creatinine ratio 12 12 - 20      GFR est AA 11 (L) >60 ml/min/1.73m2    GFR est non-AA 9 (L) >60 ml/min/1.73m2    Calcium 9.2 8.5 - 10.1 MG/DL    Phosphorus 4.4 2.6 - 4.7 MG/DL    Albumin 3.7 3.5 - 5.0 g/dL         Labs were outside of treatment parameters- no procrit required. 1530 Patient tolerated treatment well. Patient discharged from [facility] 58 ambulatory in no distress at 1530.  Patient aware of next appointment scheduled for 1/7/19 @ 3 pm

## 2019-01-07 ENCOUNTER — HOSPITAL ENCOUNTER (OUTPATIENT)
Dept: INFUSION THERAPY | Age: 74
Discharge: HOME | End: 2019-01-07
Payer: MEDICARE

## 2019-01-10 ENCOUNTER — HOSPITAL ENCOUNTER (OUTPATIENT)
Dept: INFUSION THERAPY | Age: 74
Discharge: HOME | End: 2019-01-10
Payer: MEDICARE

## 2019-01-10 VITALS
SYSTOLIC BLOOD PRESSURE: 179 MMHG | HEART RATE: 87 BPM | DIASTOLIC BLOOD PRESSURE: 75 MMHG | RESPIRATION RATE: 16 BRPM | TEMPERATURE: 98.3 F

## 2019-01-10 LAB
ALBUMIN SERPL-MCNC: 3.3 G/DL (ref 3.5–5)
ANION GAP SERPL CALC-SCNC: 16 MMOL/L (ref 5–15)
BASOPHILS # BLD: 0 K/UL (ref 0–0.1)
BASOPHILS NFR BLD: 1 % (ref 0–1)
BUN SERPL-MCNC: 48 MG/DL (ref 6–20)
BUN/CREAT SERPL: 12 (ref 12–20)
CALCIUM SERPL-MCNC: 9.4 MG/DL (ref 8.5–10.1)
CHLORIDE SERPL-SCNC: 104 MMOL/L (ref 97–108)
CO2 SERPL-SCNC: 24 MMOL/L (ref 21–32)
CREAT SERPL-MCNC: 4.14 MG/DL (ref 0.55–1.02)
DIFFERENTIAL METHOD BLD: ABNORMAL
EOSINOPHIL # BLD: 0.2 K/UL (ref 0–0.4)
EOSINOPHIL NFR BLD: 4 % (ref 0–7)
ERYTHROCYTE [DISTWIDTH] IN BLOOD BY AUTOMATED COUNT: 13.4 % (ref 11.5–14.5)
GLUCOSE SERPL-MCNC: 197 MG/DL (ref 65–100)
HCT VFR BLD AUTO: 29.9 % (ref 35–47)
HGB BLD-MCNC: 9.3 G/DL (ref 11.5–16)
IMM GRANULOCYTES # BLD AUTO: 0 K/UL (ref 0–0.04)
IMM GRANULOCYTES NFR BLD AUTO: 0 % (ref 0–0.5)
IRON SATN MFR SERPL: 39 % (ref 20–50)
IRON SERPL-MCNC: 86 UG/DL (ref 35–150)
LYMPHOCYTES # BLD: 1.2 K/UL (ref 0.8–3.5)
LYMPHOCYTES NFR BLD: 19 % (ref 12–49)
MCH RBC QN AUTO: 28 PG (ref 26–34)
MCHC RBC AUTO-ENTMCNC: 31.1 G/DL (ref 30–36.5)
MCV RBC AUTO: 90.1 FL (ref 80–99)
MONOCYTES # BLD: 0.8 K/UL (ref 0–1)
MONOCYTES NFR BLD: 12 % (ref 5–13)
NEUTS SEG # BLD: 4.1 K/UL (ref 1.8–8)
NEUTS SEG NFR BLD: 64 % (ref 32–75)
NRBC # BLD: 0 K/UL (ref 0–0.01)
NRBC BLD-RTO: 0 PER 100 WBC
PHOSPHATE SERPL-MCNC: 4.3 MG/DL (ref 2.6–4.7)
PLATELET # BLD AUTO: 238 K/UL (ref 150–400)
PMV BLD AUTO: 9.9 FL (ref 8.9–12.9)
POTASSIUM SERPL-SCNC: 4.2 MMOL/L (ref 3.5–5.1)
RBC # BLD AUTO: 3.32 M/UL (ref 3.8–5.2)
SODIUM SERPL-SCNC: 144 MMOL/L (ref 136–145)
TIBC SERPL-MCNC: 223 UG/DL (ref 250–450)
WBC # BLD AUTO: 6.3 K/UL (ref 3.6–11)

## 2019-01-10 PROCEDURE — 74011250636 HC RX REV CODE- 250/636

## 2019-01-10 PROCEDURE — 96372 THER/PROPH/DIAG INJ SC/IM: CPT

## 2019-01-10 PROCEDURE — 83540 ASSAY OF IRON: CPT

## 2019-01-10 PROCEDURE — 80069 RENAL FUNCTION PANEL: CPT

## 2019-01-10 PROCEDURE — 36415 COLL VENOUS BLD VENIPUNCTURE: CPT

## 2019-01-10 PROCEDURE — 85025 COMPLETE CBC W/AUTO DIFF WBC: CPT

## 2019-01-10 RX ADMIN — ERYTHROPOIETIN 25000 UNITS: 40000 INJECTION, SOLUTION INTRAVENOUS; SUBCUTANEOUS at 14:16

## 2019-01-10 NOTE — PROGRESS NOTES
[facility] VISIT NOTE Pt arrived at [facility] ambulatory with rolling walker and in no distress for lab draw and Procrit SQ. Assessment completed, pt has no new complaints. Vital Signs were as follows: 
Patient Vitals for the past 12 hrs: 
 Temp Pulse Resp BP  
01/10/19 1306 98.3 °F (36.8 °C) 87 16 179/75 Labs were drawn peripherally with butterfly needle. Tolerated well by patient. Lab values were as follows: 
Recent Results (from the past 12 hour(s)) CBC WITH AUTOMATED DIFF Collection Time: 01/10/19  1:15 PM  
Result Value Ref Range WBC 6.3 3.6 - 11.0 K/uL  
 RBC 3.32 (L) 3.80 - 5.20 M/uL HGB 9.3 (L) 11.5 - 16.0 g/dL HCT 29.9 (L) 35.0 - 47.0 % MCV 90.1 80.0 - 99.0 FL  
 MCH 28.0 26.0 - 34.0 PG  
 MCHC 31.1 30.0 - 36.5 g/dL  
 RDW 13.4 11.5 - 14.5 % PLATELET 834 129 - 207 K/uL MPV 9.9 8.9 - 12.9 FL  
 NRBC 0.0 0  WBC ABSOLUTE NRBC 0.00 0.00 - 0.01 K/uL NEUTROPHILS 64 32 - 75 % LYMPHOCYTES 19 12 - 49 % MONOCYTES 12 5 - 13 % EOSINOPHILS 4 0 - 7 % BASOPHILS 1 0 - 1 % IMMATURE GRANULOCYTES 0 0.0 - 0.5 % ABS. NEUTROPHILS 4.1 1.8 - 8.0 K/UL  
 ABS. LYMPHOCYTES 1.2 0.8 - 3.5 K/UL  
 ABS. MONOCYTES 0.8 0.0 - 1.0 K/UL  
 ABS. EOSINOPHILS 0.2 0.0 - 0.4 K/UL  
 ABS. BASOPHILS 0.0 0.0 - 0.1 K/UL  
 ABS. IMM. GRANS. 0.0 0.00 - 0.04 K/UL  
 DF AUTOMATED Medications received: 
Procrit SQ Left arm Tolerated treatment well, no adverse reaction noted. D/C'd from [facility] ambulatory and in no distress accompanied by self. Next appointment is 2/14/19 at 1430.

## 2019-01-10 NOTE — PROGRESS NOTES
Problem: Patient Education:  Go to Education Activity Goal: Patient/Family Education Patient here today for labs and procrit injection

## 2019-02-04 ENCOUNTER — HOSPITAL ENCOUNTER (OUTPATIENT)
Dept: INFUSION THERAPY | Age: 74
Discharge: HOME | End: 2019-02-04
Payer: MEDICARE

## 2019-02-04 VITALS
TEMPERATURE: 98.4 F | DIASTOLIC BLOOD PRESSURE: 63 MMHG | SYSTOLIC BLOOD PRESSURE: 131 MMHG | RESPIRATION RATE: 18 BRPM | HEART RATE: 83 BPM

## 2019-02-04 LAB
BASOPHILS # BLD: 0 K/UL (ref 0–0.1)
BASOPHILS NFR BLD: 1 % (ref 0–1)
CALCIUM SERPL-MCNC: 9.5 MG/DL (ref 8.5–10.1)
DIFFERENTIAL METHOD BLD: ABNORMAL
EOSINOPHIL # BLD: 0.1 K/UL (ref 0–0.4)
EOSINOPHIL NFR BLD: 4 % (ref 0–7)
ERYTHROCYTE [DISTWIDTH] IN BLOOD BY AUTOMATED COUNT: 13.1 % (ref 11.5–14.5)
FERRITIN SERPL-MCNC: 250 NG/ML (ref 8–252)
HCT VFR BLD AUTO: 30.5 % (ref 35–47)
HGB BLD-MCNC: 9.7 G/DL (ref 11.5–16)
IMM GRANULOCYTES # BLD AUTO: 0 K/UL (ref 0–0.04)
IMM GRANULOCYTES NFR BLD AUTO: 0 % (ref 0–0.5)
IRON SATN MFR SERPL: 38 % (ref 20–50)
IRON SERPL-MCNC: 87 UG/DL (ref 35–150)
LYMPHOCYTES # BLD: 0.9 K/UL (ref 0.8–3.5)
LYMPHOCYTES NFR BLD: 27 % (ref 12–49)
MCH RBC QN AUTO: 29 PG (ref 26–34)
MCHC RBC AUTO-ENTMCNC: 31.8 G/DL (ref 30–36.5)
MCV RBC AUTO: 91 FL (ref 80–99)
MONOCYTES # BLD: 0.6 K/UL (ref 0–1)
MONOCYTES NFR BLD: 19 % (ref 5–13)
NEUTS SEG # BLD: 1.6 K/UL (ref 1.8–8)
NEUTS SEG NFR BLD: 49 % (ref 32–75)
NRBC # BLD: 0 K/UL (ref 0–0.01)
NRBC BLD-RTO: 0 PER 100 WBC
PLATELET # BLD AUTO: 249 K/UL (ref 150–400)
PMV BLD AUTO: 9.7 FL (ref 8.9–12.9)
PTH-INTACT SERPL-MCNC: 49.7 PG/ML (ref 18.4–88)
RBC # BLD AUTO: 3.35 M/UL (ref 3.8–5.2)
TIBC SERPL-MCNC: 227 UG/DL (ref 250–450)
WBC # BLD AUTO: 3.3 K/UL (ref 3.6–11)

## 2019-02-04 PROCEDURE — 85025 COMPLETE CBC W/AUTO DIFF WBC: CPT

## 2019-02-04 PROCEDURE — 74011250636 HC RX REV CODE- 250/636: Performed by: INTERNAL MEDICINE

## 2019-02-04 PROCEDURE — 74011250636 HC RX REV CODE- 250/636

## 2019-02-04 PROCEDURE — 82728 ASSAY OF FERRITIN: CPT

## 2019-02-04 PROCEDURE — 83970 ASSAY OF PARATHORMONE: CPT

## 2019-02-04 PROCEDURE — 96372 THER/PROPH/DIAG INJ SC/IM: CPT

## 2019-02-04 PROCEDURE — 36415 COLL VENOUS BLD VENIPUNCTURE: CPT

## 2019-02-04 PROCEDURE — 83540 ASSAY OF IRON: CPT

## 2019-02-04 RX ADMIN — ERYTHROPOIETIN 25000 UNITS: 40000 INJECTION, SOLUTION INTRAVENOUS; SUBCUTANEOUS at 16:27

## 2019-02-04 NOTE — PROGRESS NOTES
Outpatient Infusion Center Short Visit Progress Note 1430 Patient admitted to [facility] for Procrit ambulatory in stable condition. Assessment completed. No new concerns voiced. Visit Vitals /63 (BP 1 Location: Left arm, BP Patient Position: Sitting) Pulse 83 Temp 98.4 °F (36.9 °C) Resp 18 Recent Results (from the past 12 hour(s)) CBC WITH AUTOMATED DIFF Collection Time: 02/04/19  3:00 PM  
Result Value Ref Range WBC 3.3 (L) 3.6 - 11.0 K/uL  
 RBC 3.35 (L) 3.80 - 5.20 M/uL HGB 9.7 (L) 11.5 - 16.0 g/dL HCT 30.5 (L) 35.0 - 47.0 % MCV 91.0 80.0 - 99.0 FL  
 MCH 29.0 26.0 - 34.0 PG  
 MCHC 31.8 30.0 - 36.5 g/dL  
 RDW 13.1 11.5 - 14.5 % PLATELET 182 236 - 149 K/uL MPV 9.7 8.9 - 12.9 FL  
 NRBC 0.0 0  WBC ABSOLUTE NRBC 0.00 0.00 - 0.01 K/uL NEUTROPHILS 49 32 - 75 % LYMPHOCYTES 27 12 - 49 % MONOCYTES 19 (H) 5 - 13 % EOSINOPHILS 4 0 - 7 % BASOPHILS 1 0 - 1 % IMMATURE GRANULOCYTES 0 0.0 - 0.5 % ABS. NEUTROPHILS 1.6 (L) 1.8 - 8.0 K/UL  
 ABS. LYMPHOCYTES 0.9 0.8 - 3.5 K/UL  
 ABS. MONOCYTES 0.6 0.0 - 1.0 K/UL  
 ABS. EOSINOPHILS 0.1 0.0 - 0.4 K/UL  
 ABS. BASOPHILS 0.0 0.0 - 0.1 K/UL  
 ABS. IMM. GRANS. 0.0 0.00 - 0.04 K/UL  
 DF AUTOMATED IRON PROFILE Collection Time: 02/04/19  3:00 PM  
Result Value Ref Range Iron 87 35 - 150 ug/dL TIBC 227 (L) 250 - 450 ug/dL Iron % saturation 38 20 - 50 % FERRITIN Collection Time: 02/04/19  3:02 PM  
Result Value Ref Range Ferritin 250 8 - 252 NG/ML  
PTH INTACT Collection Time: 02/04/19  3:02 PM  
Result Value Ref Range Calcium 9.5 8.5 - 10.1 MG/DL  
 PTH, Intact 49.7 18.4 - 88.0 pg/mL Medications: 
Procrit SQ 
 
1630 Patient tolerated treatment well. Patient discharged from [place] [address] ambulatory in no distress at 1630. Patient aware of next appointment scheduled for 3/4/19 @ 1430

## 2019-03-04 ENCOUNTER — HOSPITAL ENCOUNTER (OUTPATIENT)
Dept: INFUSION THERAPY | Age: 74
Discharge: HOME | End: 2019-03-04
Payer: MEDICARE

## 2019-03-04 VITALS
SYSTOLIC BLOOD PRESSURE: 123 MMHG | HEART RATE: 83 BPM | TEMPERATURE: 98.1 F | DIASTOLIC BLOOD PRESSURE: 59 MMHG | RESPIRATION RATE: 18 BRPM | OXYGEN SATURATION: 95 %

## 2019-03-04 LAB
ALBUMIN SERPL-MCNC: 3.5 G/DL (ref 3.5–5)
ANION GAP SERPL CALC-SCNC: 6 MMOL/L (ref 5–15)
BASOPHILS # BLD: 0 K/UL (ref 0–0.1)
BASOPHILS NFR BLD: 1 % (ref 0–1)
BUN SERPL-MCNC: 68 MG/DL (ref 6–20)
BUN/CREAT SERPL: 13 (ref 12–20)
CALCIUM SERPL-MCNC: 9.9 MG/DL (ref 8.5–10.1)
CHLORIDE SERPL-SCNC: 107 MMOL/L (ref 97–108)
CO2 SERPL-SCNC: 23 MMOL/L (ref 21–32)
CREAT SERPL-MCNC: 5.08 MG/DL (ref 0.55–1.02)
DIFFERENTIAL METHOD BLD: ABNORMAL
EOSINOPHIL # BLD: 0.3 K/UL (ref 0–0.4)
EOSINOPHIL NFR BLD: 5 % (ref 0–7)
ERYTHROCYTE [DISTWIDTH] IN BLOOD BY AUTOMATED COUNT: 13 % (ref 11.5–14.5)
GLUCOSE SERPL-MCNC: 179 MG/DL (ref 65–100)
HCT VFR BLD AUTO: 31.9 % (ref 35–47)
HGB BLD-MCNC: 9.9 G/DL (ref 11.5–16)
IMM GRANULOCYTES # BLD AUTO: 0 K/UL (ref 0–0.04)
IMM GRANULOCYTES NFR BLD AUTO: 0 % (ref 0–0.5)
IRON SATN MFR SERPL: 51 % (ref 20–50)
IRON SERPL-MCNC: 118 UG/DL (ref 35–150)
LYMPHOCYTES # BLD: 1.6 K/UL (ref 0.8–3.5)
LYMPHOCYTES NFR BLD: 29 % (ref 12–49)
MCH RBC QN AUTO: 28.2 PG (ref 26–34)
MCHC RBC AUTO-ENTMCNC: 31 G/DL (ref 30–36.5)
MCV RBC AUTO: 90.9 FL (ref 80–99)
MONOCYTES # BLD: 0.7 K/UL (ref 0–1)
MONOCYTES NFR BLD: 13 % (ref 5–13)
NEUTS SEG # BLD: 2.9 K/UL (ref 1.8–8)
NEUTS SEG NFR BLD: 53 % (ref 32–75)
NRBC # BLD: 0 K/UL (ref 0–0.01)
NRBC BLD-RTO: 0 PER 100 WBC
PHOSPHATE SERPL-MCNC: 4.4 MG/DL (ref 2.6–4.7)
PLATELET # BLD AUTO: 277 K/UL (ref 150–400)
PMV BLD AUTO: 9 FL (ref 8.9–12.9)
POTASSIUM SERPL-SCNC: 5.7 MMOL/L (ref 3.5–5.1)
RBC # BLD AUTO: 3.51 M/UL (ref 3.8–5.2)
SODIUM SERPL-SCNC: 136 MMOL/L (ref 136–145)
TIBC SERPL-MCNC: 233 UG/DL (ref 250–450)
WBC # BLD AUTO: 5.5 K/UL (ref 3.6–11)

## 2019-03-04 PROCEDURE — 96372 THER/PROPH/DIAG INJ SC/IM: CPT

## 2019-03-04 PROCEDURE — 74011250636 HC RX REV CODE- 250/636: Performed by: INTERNAL MEDICINE

## 2019-03-04 PROCEDURE — 80069 RENAL FUNCTION PANEL: CPT

## 2019-03-04 PROCEDURE — 85025 COMPLETE CBC W/AUTO DIFF WBC: CPT

## 2019-03-04 PROCEDURE — 74011250636 HC RX REV CODE- 250/636

## 2019-03-04 PROCEDURE — 83540 ASSAY OF IRON: CPT

## 2019-03-04 PROCEDURE — 36415 COLL VENOUS BLD VENIPUNCTURE: CPT

## 2019-03-04 RX ADMIN — ERYTHROPOIETIN 25000 UNITS: 10000 INJECTION, SOLUTION INTRAVENOUS; SUBCUTANEOUS at 14:51

## 2019-03-04 NOTE — PROGRESS NOTES
[facility] Progress Note Date: 2019 Name: [facility] MRN: [id number] : 1945 
 
1415. Ms. Yue Grimes Arrived ambulatory and in no distress for Procrit. Assessment was completed, no acute issues at this time, no new complaints voiced. Labs drawn peripherally by INES Jordan from L arm without difficulty, sent & in process. Ms. Amari Samano vitals were reviewed. Patient Vitals for the past 12 hrs: 
 Temp Pulse Resp BP SpO2  
19 1423 98.1 °F (36.7 °C) 83 18 123/59 95 % Lab results were obtained and reviewed. Recent Results (from the past 12 hour(s)) CBC WITH AUTOMATED DIFF Collection Time: 19  2:14 PM  
Result Value Ref Range WBC 5.5 3.6 - 11.0 K/uL  
 RBC 3.51 (L) 3.80 - 5.20 M/uL HGB 9.9 (L) 11.5 - 16.0 g/dL HCT 31.9 (L) 35.0 - 47.0 % MCV 90.9 80.0 - 99.0 FL  
 MCH 28.2 26.0 - 34.0 PG  
 MCHC 31.0 30.0 - 36.5 g/dL  
 RDW 13.0 11.5 - 14.5 % PLATELET 926 949 - 366 K/uL MPV 9.0 8.9 - 12.9 FL  
 NRBC 0.0 0  WBC ABSOLUTE NRBC 0.00 0.00 - 0.01 K/uL NEUTROPHILS 53 32 - 75 % LYMPHOCYTES 29 12 - 49 % MONOCYTES 13 5 - 13 % EOSINOPHILS 5 0 - 7 % BASOPHILS 1 0 - 1 % IMMATURE GRANULOCYTES 0 0.0 - 0.5 % ABS. NEUTROPHILS 2.9 1.8 - 8.0 K/UL  
 ABS. LYMPHOCYTES 1.6 0.8 - 3.5 K/UL  
 ABS. MONOCYTES 0.7 0.0 - 1.0 K/UL  
 ABS. EOSINOPHILS 0.3 0.0 - 0.4 K/UL  
 ABS. BASOPHILS 0.0 0.0 - 0.1 K/UL  
 ABS. IMM. GRANS. 0.0 0.00 - 0.04 K/UL  
 DF AUTOMATED RENAL FUNCTION PANEL Collection Time: 19  2:14 PM  
Result Value Ref Range Sodium 136 136 - 145 mmol/L Potassium 5.7 (H) 3.5 - 5.1 mmol/L Chloride 107 97 - 108 mmol/L  
 CO2 23 21 - 32 mmol/L Anion gap 6 5 - 15 mmol/L Glucose 179 (H) 65 - 100 mg/dL BUN 68 (H) 6 - 20 MG/DL Creatinine 5.08 (H) 0.55 - 1.02 MG/DL  
 BUN/Creatinine ratio 13 12 - 20 GFR est AA 10 (L) >60 ml/min/1.73m2 GFR est non-AA 8 (L) >60 ml/min/1.73m2 Calcium 9.9 8.5 - 10.1 MG/DL Phosphorus 4.4 2.6 - 4.7 MG/DL Albumin 3.5 3.5 - 5.0 g/dL Medications: 
Procrit subcutaneous to L arm 
 
1455. Ms. Prabhjot Brothers tolerated treatment well and was discharged from [place] [address] in stable condition. She is to return on 04/01/19 for her next appointment. Tanya Fermin RN 
March 4, 2019

## 2019-04-01 ENCOUNTER — HOSPITAL ENCOUNTER (OUTPATIENT)
Dept: INFUSION THERAPY | Age: 74
Discharge: HOME | End: 2019-04-01
Payer: MEDICARE

## 2019-04-01 LAB
ALBUMIN SERPL-MCNC: 3.6 G/DL (ref 3.5–5)
ANION GAP SERPL CALC-SCNC: 6 MMOL/L (ref 5–15)
BASO+EOS+MONOS # BLD AUTO: 0.6 K/UL (ref 0.2–1.2)
BASO+EOS+MONOS NFR BLD AUTO: 12 % (ref 3.2–16.9)
BUN SERPL-MCNC: 60 MG/DL (ref 6–20)
BUN/CREAT SERPL: 12 (ref 12–20)
CALCIUM SERPL-MCNC: 8.9 MG/DL (ref 8.5–10.1)
CHLORIDE SERPL-SCNC: 109 MMOL/L (ref 97–108)
CO2 SERPL-SCNC: 23 MMOL/L (ref 21–32)
CREAT SERPL-MCNC: 5.01 MG/DL (ref 0.55–1.02)
DIFFERENTIAL METHOD BLD: ABNORMAL
ERYTHROCYTE [DISTWIDTH] IN BLOOD BY AUTOMATED COUNT: 13.8 % (ref 11.8–15.8)
GLUCOSE SERPL-MCNC: 159 MG/DL (ref 65–100)
HCT VFR BLD AUTO: 31.2 % (ref 35–47)
HGB BLD-MCNC: 10.4 G/DL (ref 11.5–16)
IRON SATN MFR SERPL: 62 % (ref 20–50)
IRON SERPL-MCNC: 134 UG/DL (ref 35–150)
LYMPHOCYTES # BLD: 1.3 K/UL (ref 0.8–3.5)
LYMPHOCYTES NFR BLD: 26 % (ref 12–49)
MCH RBC QN AUTO: 30 PG (ref 26–34)
MCHC RBC AUTO-ENTMCNC: 33.3 G/DL (ref 30–36.5)
MCV RBC AUTO: 89.9 FL (ref 80–99)
NEUTS SEG # BLD: 3.2 K/UL (ref 1.8–8)
NEUTS SEG NFR BLD: 62 % (ref 32–75)
PHOSPHATE SERPL-MCNC: 4 MG/DL (ref 2.6–4.7)
PLATELET # BLD AUTO: 280 K/UL (ref 150–400)
POTASSIUM SERPL-SCNC: 5.3 MMOL/L (ref 3.5–5.1)
RBC # BLD AUTO: 3.47 M/UL (ref 3.8–5.2)
SODIUM SERPL-SCNC: 138 MMOL/L (ref 136–145)
TIBC SERPL-MCNC: 216 UG/DL (ref 250–450)
WBC # BLD AUTO: 5.1 K/UL (ref 3.6–11)

## 2019-04-01 PROCEDURE — 85025 COMPLETE CBC W/AUTO DIFF WBC: CPT

## 2019-04-01 PROCEDURE — 74011250636 HC RX REV CODE- 250/636

## 2019-04-01 PROCEDURE — 36415 COLL VENOUS BLD VENIPUNCTURE: CPT

## 2019-04-01 PROCEDURE — 96372 THER/PROPH/DIAG INJ SC/IM: CPT

## 2019-04-01 PROCEDURE — 80069 RENAL FUNCTION PANEL: CPT

## 2019-04-01 PROCEDURE — 83540 ASSAY OF IRON: CPT

## 2019-04-01 RX ADMIN — ERYTHROPOIETIN 25000 UNITS: 10000 INJECTION, SOLUTION INTRAVENOUS; SUBCUTANEOUS at 15:17

## 2019-04-01 NOTE — PROGRESS NOTES
[facility]patient [facility]fusion [facility] Short Visit Progress Note 0620 Patient admitted to [facility] for [facility] ambulatory in stable condition. Assessment completed. No new concerns voiced. Labs drawn in [place] [facility] with no issues. Labs sent for processing. Results were within parameter to treat. Procrit given SQ in [facility] with no issues. Recent Results (from the past 12 hour(s)) RENAL FUNCTION PANEL Collection Time: 04/01/19  2:43 PM  
Result Value Ref Range Sodium 138 136 - 145 mmol/L Potassium 5.3 (H) 3.5 - 5.1 mmol/L Chloride 109 (H) 97 - 108 mmol/L  
 CO2 23 21 - 32 mmol/L Anion gap 6 5 - 15 mmol/L Glucose 159 (H) 65 - 100 mg/dL BUN 60 (H) 6 - 20 MG/DL Creatinine 5.01 (H) 0.55 - 1.02 MG/DL  
 BUN/Creatinine ratio 12 12 - 20 GFR est AA 10 (L) >60 ml/min/1.73m2 GFR est non-AA 8 (L) >60 ml/min/1.73m2 Calcium 8.9 8.5 - 10.1 MG/DL Phosphorus 4.0 2.6 - 4.7 MG/DL Albumin 3.6 3.5 - 5.0 g/dL CBC WITH 3 PART DIFF Collection Time: 04/01/19  2:43 PM  
Result Value Ref Range WBC 5.1 3.6 - 11.0 K/uL  
 RBC 3.47 (L) 3.80 - 5.20 M/uL  
 HGB 10.4 (L) 11.5 - 16.0 g/dL HCT 31.2 (L) 35.0 - 47.0 % MCV 89.9 80.0 - 99.0 FL  
 MCH 30.0 26.0 - 34.0 PG  
 MCHC 33.3 30.0 - 36.5 g/dL  
 RDW 13.8 11.8 - 15.8 % PLATELET 659 505 - 558 K/uL NEUTROPHILS 62 32 - 75 % MIXED CELLS 12 3.2 - 16.9 % LYMPHOCYTES 26 12 - 49 % ABS. NEUTROPHILS 3.2 1.8 - 8.0 K/UL  
 ABS. MIXED CELLS 0.6 0.2 - 1.2 K/uL  
 ABS. LYMPHOCYTES 1.3 0.8 - 3.5 K/UL  
 DF AUTOMATED Medications: 
Procrit SQ 
 
1520 Patient tolerated treatment well. Patient discharged from [facility] 56 ambulatory in no distress at 1520.  Patient aware of next appointment scheduled for 4/29/19 at 2:30 pm.

## 2019-04-29 ENCOUNTER — HOSPITAL ENCOUNTER (OUTPATIENT)
Dept: INFUSION THERAPY | Age: 74
Discharge: HOME | End: 2019-04-29
Payer: MEDICARE

## 2019-04-29 VITALS
DIASTOLIC BLOOD PRESSURE: 60 MMHG | RESPIRATION RATE: 18 BRPM | SYSTOLIC BLOOD PRESSURE: 132 MMHG | TEMPERATURE: 98.9 F | HEART RATE: 79 BPM

## 2019-04-29 LAB
ALBUMIN SERPL-MCNC: 3.7 G/DL (ref 3.5–5)
ANION GAP SERPL CALC-SCNC: 6 MMOL/L (ref 5–15)
BASOPHILS # BLD: 0 K/UL (ref 0–0.1)
BASOPHILS NFR BLD: 1 % (ref 0–1)
BUN SERPL-MCNC: 40 MG/DL (ref 6–20)
BUN/CREAT SERPL: 10 (ref 12–20)
CALCIUM SERPL-MCNC: 9.4 MG/DL (ref 8.5–10.1)
CHLORIDE SERPL-SCNC: 108 MMOL/L (ref 97–108)
CO2 SERPL-SCNC: 26 MMOL/L (ref 21–32)
CREAT SERPL-MCNC: 4.08 MG/DL (ref 0.55–1.02)
DIFFERENTIAL METHOD BLD: ABNORMAL
EOSINOPHIL # BLD: 0.2 K/UL (ref 0–0.4)
EOSINOPHIL NFR BLD: 5 % (ref 0–7)
ERYTHROCYTE [DISTWIDTH] IN BLOOD BY AUTOMATED COUNT: 13.2 % (ref 11.5–14.5)
GLUCOSE SERPL-MCNC: 104 MG/DL (ref 65–100)
HCT VFR BLD AUTO: 31.3 % (ref 35–47)
HGB BLD-MCNC: 9.7 G/DL (ref 11.5–16)
IMM GRANULOCYTES # BLD AUTO: 0 K/UL (ref 0–0.04)
IMM GRANULOCYTES NFR BLD AUTO: 0 % (ref 0–0.5)
IRON SATN MFR SERPL: 30 % (ref 20–50)
IRON SERPL-MCNC: 69 UG/DL (ref 35–150)
LYMPHOCYTES # BLD: 1.6 K/UL (ref 0.8–3.5)
LYMPHOCYTES NFR BLD: 32 % (ref 12–49)
MCH RBC QN AUTO: 28.3 PG (ref 26–34)
MCHC RBC AUTO-ENTMCNC: 31 G/DL (ref 30–36.5)
MCV RBC AUTO: 91.3 FL (ref 80–99)
MONOCYTES # BLD: 0.7 K/UL (ref 0–1)
MONOCYTES NFR BLD: 14 % (ref 5–13)
NEUTS SEG # BLD: 2.4 K/UL (ref 1.8–8)
NEUTS SEG NFR BLD: 48 % (ref 32–75)
NRBC # BLD: 0 K/UL (ref 0–0.01)
NRBC BLD-RTO: 0 PER 100 WBC
PHOSPHATE SERPL-MCNC: 3 MG/DL (ref 2.6–4.7)
PLATELET # BLD AUTO: 210 K/UL (ref 150–400)
PMV BLD AUTO: 9.6 FL (ref 8.9–12.9)
POTASSIUM SERPL-SCNC: 4.2 MMOL/L (ref 3.5–5.1)
RBC # BLD AUTO: 3.43 M/UL (ref 3.8–5.2)
SODIUM SERPL-SCNC: 140 MMOL/L (ref 136–145)
TIBC SERPL-MCNC: 229 UG/DL (ref 250–450)
WBC # BLD AUTO: 4.9 K/UL (ref 3.6–11)

## 2019-04-29 PROCEDURE — 85025 COMPLETE CBC W/AUTO DIFF WBC: CPT

## 2019-04-29 PROCEDURE — 83540 ASSAY OF IRON: CPT

## 2019-04-29 PROCEDURE — 96372 THER/PROPH/DIAG INJ SC/IM: CPT

## 2019-04-29 PROCEDURE — 36415 COLL VENOUS BLD VENIPUNCTURE: CPT

## 2019-04-29 PROCEDURE — 74011250636 HC RX REV CODE- 250/636: Performed by: INTERNAL MEDICINE

## 2019-04-29 PROCEDURE — 80069 RENAL FUNCTION PANEL: CPT

## 2019-04-29 RX ADMIN — EPOETIN ALFA-EPBX 25000 UNITS: 40000 INJECTION, SOLUTION INTRAVENOUS; SUBCUTANEOUS at 15:24

## 2019-04-29 NOTE — PROGRESS NOTES
Outpatient Infusion Center Progress Note 1425 Pt admit to [facility] for Reatacrit ambulatory in stable condition. Assessment completed. No new concerns voiced. Labs drawn from right [facility] and sent for processing. Visit Vitals /60 (BP 1 Location: Left arm, BP Patient Position: Sitting) Pulse 79 Temp 98.9 °F (37.2 °C) Resp 18 Medications: 
Reatacrit sq left arm 
 
1530 Pt tolerated treatment well. D/c home ambulatory in no distress. Pt aware of next appointment scheduled for 5/28/19. Recent Results (from the past 12 hour(s)) CBC WITH AUTOMATED DIFF Collection Time: 04/29/19  2:31 PM  
Result Value Ref Range WBC 4.9 3.6 - 11.0 K/uL  
 RBC 3.43 (L) 3.80 - 5.20 M/uL HGB 9.7 (L) 11.5 - 16.0 g/dL HCT 31.3 (L) 35.0 - 47.0 % MCV 91.3 80.0 - 99.0 FL  
 MCH 28.3 26.0 - 34.0 PG  
 MCHC 31.0 30.0 - 36.5 g/dL  
 RDW 13.2 11.5 - 14.5 % PLATELET 160 642 - 155 K/uL MPV 9.6 8.9 - 12.9 FL  
 NRBC 0.0 0  WBC ABSOLUTE NRBC 0.00 0.00 - 0.01 K/uL NEUTROPHILS 48 32 - 75 % LYMPHOCYTES 32 12 - 49 % MONOCYTES 14 (H) 5 - 13 % EOSINOPHILS 5 0 - 7 % BASOPHILS 1 0 - 1 % IMMATURE GRANULOCYTES 0 0.0 - 0.5 % ABS. NEUTROPHILS 2.4 1.8 - 8.0 K/UL  
 ABS. LYMPHOCYTES 1.6 0.8 - 3.5 K/UL  
 ABS. MONOCYTES 0.7 0.0 - 1.0 K/UL  
 ABS. EOSINOPHILS 0.2 0.0 - 0.4 K/UL  
 ABS. BASOPHILS 0.0 0.0 - 0.1 K/UL  
 ABS. IMM. GRANS. 0.0 0.00 - 0.04 K/UL  
 DF AUTOMATED

## 2019-05-29 ENCOUNTER — HOSPITAL ENCOUNTER (OUTPATIENT)
Dept: INFUSION THERAPY | Age: 74
Discharge: HOME | End: 2019-05-29
Payer: MEDICARE

## 2019-05-29 VITALS
SYSTOLIC BLOOD PRESSURE: 131 MMHG | HEART RATE: 81 BPM | DIASTOLIC BLOOD PRESSURE: 62 MMHG | TEMPERATURE: 98.6 F | RESPIRATION RATE: 18 BRPM

## 2019-05-29 LAB
ALBUMIN SERPL-MCNC: 3.4 G/DL (ref 3.5–5)
ANION GAP SERPL CALC-SCNC: 6 MMOL/L (ref 5–15)
BASO+EOS+MONOS # BLD AUTO: 0.7 K/UL (ref 0.2–1.2)
BASO+EOS+MONOS NFR BLD AUTO: 15 % (ref 3.2–16.9)
BUN SERPL-MCNC: 51 MG/DL (ref 6–20)
BUN/CREAT SERPL: 12 (ref 12–20)
CALCIUM SERPL-MCNC: 8.8 MG/DL (ref 8.5–10.1)
CHLORIDE SERPL-SCNC: 109 MMOL/L (ref 97–108)
CO2 SERPL-SCNC: 25 MMOL/L (ref 21–32)
CREAT SERPL-MCNC: 4.18 MG/DL (ref 0.55–1.02)
DIFFERENTIAL METHOD BLD: ABNORMAL
ERYTHROCYTE [DISTWIDTH] IN BLOOD BY AUTOMATED COUNT: 14.1 % (ref 11.8–15.8)
GLUCOSE SERPL-MCNC: 139 MG/DL (ref 65–100)
HCT VFR BLD AUTO: 29.3 % (ref 35–47)
HGB BLD-MCNC: 9.7 G/DL (ref 11.5–16)
IRON SATN MFR SERPL: 28 % (ref 20–50)
IRON SERPL-MCNC: 57 UG/DL (ref 35–150)
LYMPHOCYTES # BLD: 1.4 K/UL (ref 0.8–3.5)
LYMPHOCYTES NFR BLD: 28 % (ref 12–49)
MCH RBC QN AUTO: 29.6 PG (ref 26–34)
MCHC RBC AUTO-ENTMCNC: 33.1 G/DL (ref 30–36.5)
MCV RBC AUTO: 89.3 FL (ref 80–99)
NEUTS SEG # BLD: 2.9 K/UL (ref 1.8–8)
NEUTS SEG NFR BLD: 57 % (ref 32–75)
PHOSPHATE SERPL-MCNC: 4.3 MG/DL (ref 2.6–4.7)
PLATELET # BLD AUTO: 274 K/UL (ref 150–400)
POTASSIUM SERPL-SCNC: 4.4 MMOL/L (ref 3.5–5.1)
RBC # BLD AUTO: 3.28 M/UL (ref 3.8–5.2)
SODIUM SERPL-SCNC: 140 MMOL/L (ref 136–145)
TIBC SERPL-MCNC: 206 UG/DL (ref 250–450)
WBC # BLD AUTO: 5 K/UL (ref 3.6–11)

## 2019-05-29 PROCEDURE — 96372 THER/PROPH/DIAG INJ SC/IM: CPT

## 2019-05-29 PROCEDURE — 36415 COLL VENOUS BLD VENIPUNCTURE: CPT

## 2019-05-29 PROCEDURE — 74011250636 HC RX REV CODE- 250/636: Performed by: INTERNAL MEDICINE

## 2019-05-29 PROCEDURE — 85025 COMPLETE CBC W/AUTO DIFF WBC: CPT

## 2019-05-29 PROCEDURE — 83540 ASSAY OF IRON: CPT

## 2019-05-29 PROCEDURE — 80069 RENAL FUNCTION PANEL: CPT

## 2019-05-29 RX ADMIN — EPOETIN ALFA-EPBX 25000 UNITS: 40000 INJECTION, SOLUTION INTRAVENOUS; SUBCUTANEOUS at 14:36

## 2019-05-29 NOTE — PROGRESS NOTES
Outpatient Infusion Center Progress Note    9754 Pt admit to [facility] for Reatacrit ambulatory in stable condition. Assessment completed. No new concerns voiced. Labs drawn from right [facility] and sent for processing. Visit Vitals  /62 (BP 1 Location: Right arm)   Pulse 81   Temp 98.6 °F (37 °C)   Resp 18   Breastfeeding? No       Medications:  Reatacrit sq left arm    1440 Pt tolerated treatment well. D/c home ambulatory in no distress. Pt aware of next appointment scheduled for 6/25/19. Recent Results (from the past 12 hour(s))   CBC WITH 3 PART DIFF    Collection Time: 05/29/19  1:57 PM   Result Value Ref Range    WBC 5.0 3.6 - 11.0 K/uL    RBC 3.28 (L) 3.80 - 5.20 M/uL    HGB 9.7 (L) 11.5 - 16.0 g/dL    HCT 29.3 (L) 35.0 - 47.0 %    MCV 89.3 80.0 - 99.0 FL    MCH 29.6 26.0 - 34.0 PG    MCHC 33.1 30.0 - 36.5 g/dL    RDW 14.1 11.8 - 15.8 %    PLATELET 099 971 - 446 K/uL    NEUTROPHILS 57 32 - 75 %    MIXED CELLS 15 3.2 - 16.9 %    LYMPHOCYTES 28 12 - 49 %    ABS. NEUTROPHILS 2.9 1.8 - 8.0 K/UL    ABS. MIXED CELLS 0.7 0.2 - 1.2 K/uL    ABS.  LYMPHOCYTES 1.4 0.8 - 3.5 K/UL    DF AUTOMATED

## 2019-06-25 ENCOUNTER — HOSPITAL ENCOUNTER (OUTPATIENT)
Dept: INFUSION THERAPY | Age: 74
Discharge: HOME | End: 2019-06-25
Payer: MEDICARE

## 2019-06-25 VITALS
DIASTOLIC BLOOD PRESSURE: 65 MMHG | SYSTOLIC BLOOD PRESSURE: 138 MMHG | RESPIRATION RATE: 18 BRPM | TEMPERATURE: 98.6 F | HEART RATE: 81 BPM

## 2019-06-25 LAB
ALBUMIN SERPL-MCNC: 3.5 G/DL (ref 3.5–5)
ANION GAP SERPL CALC-SCNC: 7 MMOL/L (ref 5–15)
BASO+EOS+MONOS # BLD AUTO: 0.5 K/UL (ref 0.2–1.2)
BASO+EOS+MONOS NFR BLD AUTO: 13 % (ref 3.2–16.9)
BUN SERPL-MCNC: 37 MG/DL (ref 6–20)
BUN/CREAT SERPL: 9 (ref 12–20)
CALCIUM SERPL-MCNC: 9.3 MG/DL (ref 8.5–10.1)
CALCIUM SERPL-MCNC: 9.6 MG/DL (ref 8.5–10.1)
CHLORIDE SERPL-SCNC: 106 MMOL/L (ref 97–108)
CO2 SERPL-SCNC: 27 MMOL/L (ref 21–32)
CREAT SERPL-MCNC: 4.33 MG/DL (ref 0.55–1.02)
DIFFERENTIAL METHOD BLD: ABNORMAL
ERYTHROCYTE [DISTWIDTH] IN BLOOD BY AUTOMATED COUNT: 14 % (ref 11.8–15.8)
FERRITIN SERPL-MCNC: 327 NG/ML (ref 26–388)
GLUCOSE SERPL-MCNC: 192 MG/DL (ref 65–100)
HCT VFR BLD AUTO: 27.4 % (ref 35–47)
HGB BLD-MCNC: 9.3 G/DL (ref 11.5–16)
IRON SATN MFR SERPL: 37 % (ref 20–50)
IRON SERPL-MCNC: 75 UG/DL (ref 35–150)
LYMPHOCYTES # BLD: 1.1 K/UL (ref 0.8–3.5)
LYMPHOCYTES NFR BLD: 30 % (ref 12–49)
MCH RBC QN AUTO: 29.9 PG (ref 26–34)
MCHC RBC AUTO-ENTMCNC: 33.9 G/DL (ref 30–36.5)
MCV RBC AUTO: 88.1 FL (ref 80–99)
NEUTS SEG # BLD: 2 K/UL (ref 1.8–8)
NEUTS SEG NFR BLD: 57 % (ref 32–75)
PHOSPHATE SERPL-MCNC: 3.7 MG/DL (ref 2.6–4.7)
PLATELET # BLD AUTO: 222 K/UL (ref 150–400)
POTASSIUM SERPL-SCNC: 4.1 MMOL/L (ref 3.5–5.1)
PTH-INTACT SERPL-MCNC: 47.4 PG/ML (ref 18.4–88)
RBC # BLD AUTO: 3.11 M/UL (ref 3.8–5.2)
SODIUM SERPL-SCNC: 140 MMOL/L (ref 136–145)
TIBC SERPL-MCNC: 202 UG/DL (ref 250–450)
WBC # BLD AUTO: 3.6 K/UL (ref 3.6–11)

## 2019-06-25 PROCEDURE — 82728 ASSAY OF FERRITIN: CPT

## 2019-06-25 PROCEDURE — 83970 ASSAY OF PARATHORMONE: CPT

## 2019-06-25 PROCEDURE — 36415 COLL VENOUS BLD VENIPUNCTURE: CPT

## 2019-06-25 PROCEDURE — 85025 COMPLETE CBC W/AUTO DIFF WBC: CPT

## 2019-06-25 PROCEDURE — 80069 RENAL FUNCTION PANEL: CPT

## 2019-06-25 PROCEDURE — 96372 THER/PROPH/DIAG INJ SC/IM: CPT

## 2019-06-25 PROCEDURE — 83540 ASSAY OF IRON: CPT

## 2019-06-25 PROCEDURE — 74011250636 HC RX REV CODE- 250/636: Performed by: INTERNAL MEDICINE

## 2019-06-25 RX ADMIN — EPOETIN ALFA-EPBX 25000 UNITS: 40000 INJECTION, SOLUTION INTRAVENOUS; SUBCUTANEOUS at 15:06

## 2019-06-25 NOTE — PROGRESS NOTES
[facility] VISIT NOTE    5316  Pt arrived at [facility] ambulatory and in no distress for Retacrit. Assessment completed, pt c/o chronic knee pain. Labs drawn peripherally and resulted within parameters to treat. Medications received:  Retacrit SQ left arm    Tolerated treatment well, no adverse reaction noted. Patient Vitals for the past 12 hrs:   Temp Pulse Resp BP   06/25/19 1409 98.6 °F (37 °C) 81 18 138/65     Recent Results (from the past 12 hour(s))   RENAL FUNCTION PANEL    Collection Time: 06/25/19  2:19 PM   Result Value Ref Range    Sodium 140 136 - 145 mmol/L    Potassium 4.1 3.5 - 5.1 mmol/L    Chloride 106 97 - 108 mmol/L    CO2 27 21 - 32 mmol/L    Anion gap 7 5 - 15 mmol/L    Glucose 192 (H) 65 - 100 mg/dL    BUN 37 (H) 6 - 20 MG/DL    Creatinine 4.33 (H) 0.55 - 1.02 MG/DL    BUN/Creatinine ratio 9 (L) 12 - 20      GFR est AA 12 (L) >60 ml/min/1.73m2    GFR est non-AA 10 (L) >60 ml/min/1.73m2    Calcium 9.6 8.5 - 10.1 MG/DL    Phosphorus 3.7 2.6 - 4.7 MG/DL    Albumin 3.5 3.5 - 5.0 g/dL   CBC WITH 3 PART DIFF    Collection Time: 06/25/19  2:19 PM   Result Value Ref Range    WBC 3.6 3.6 - 11.0 K/uL    RBC 3.11 (L) 3.80 - 5.20 M/uL    HGB 9.3 (L) 11.5 - 16.0 g/dL    HCT 27.4 (L) 35.0 - 47.0 %    MCV 88.1 80.0 - 99.0 FL    MCH 29.9 26.0 - 34.0 PG    MCHC 33.9 30.0 - 36.5 g/dL    RDW 14.0 11.8 - 15.8 %    PLATELET 814 489 - 321 K/uL    NEUTROPHILS 57 32 - 75 %    MIXED CELLS 13 3.2 - 16.9 %    LYMPHOCYTES 30 12 - 49 %    ABS. NEUTROPHILS 2.0 1.8 - 8.0 K/UL    ABS. MIXED CELLS 0.5 0.2 - 1.2 K/uL    ABS. LYMPHOCYTES 1.1 0.8 - 3.5 K/UL    DF AUTOMATED       1510  D/C'd from [facility] ambulatory and in no distress. Next appointment is 7/23/19 at 1400.

## 2019-07-23 ENCOUNTER — HOSPITAL ENCOUNTER (OUTPATIENT)
Dept: INFUSION THERAPY | Age: 74
Discharge: HOME | End: 2019-07-23
Payer: MEDICARE

## 2019-07-23 VITALS
DIASTOLIC BLOOD PRESSURE: 57 MMHG | RESPIRATION RATE: 18 BRPM | OXYGEN SATURATION: 95 % | HEART RATE: 83 BPM | SYSTOLIC BLOOD PRESSURE: 113 MMHG | TEMPERATURE: 97.8 F

## 2019-07-23 LAB
ALBUMIN SERPL-MCNC: 3.6 G/DL (ref 3.5–5)
ANION GAP SERPL CALC-SCNC: 6 MMOL/L (ref 5–15)
BASOPHILS # BLD: 0 K/UL (ref 0–0.1)
BASOPHILS NFR BLD: 1 % (ref 0–1)
BUN SERPL-MCNC: 40 MG/DL (ref 6–20)
BUN/CREAT SERPL: 9 (ref 12–20)
CALCIUM SERPL-MCNC: 8.5 MG/DL (ref 8.5–10.1)
CHLORIDE SERPL-SCNC: 109 MMOL/L (ref 97–108)
CO2 SERPL-SCNC: 25 MMOL/L (ref 21–32)
CREAT SERPL-MCNC: 4.52 MG/DL (ref 0.55–1.02)
DIFFERENTIAL METHOD BLD: ABNORMAL
EOSINOPHIL # BLD: 0.2 K/UL (ref 0–0.4)
EOSINOPHIL NFR BLD: 6 % (ref 0–7)
ERYTHROCYTE [DISTWIDTH] IN BLOOD BY AUTOMATED COUNT: 13.5 % (ref 11.5–14.5)
GLUCOSE SERPL-MCNC: 220 MG/DL (ref 65–100)
HCT VFR BLD AUTO: 28 % (ref 35–47)
HGB BLD-MCNC: 8.9 G/DL (ref 11.5–16)
IMM GRANULOCYTES # BLD AUTO: 0 K/UL (ref 0–0.04)
IMM GRANULOCYTES NFR BLD AUTO: 1 % (ref 0–0.5)
IRON SATN MFR SERPL: 31 % (ref 20–50)
IRON SERPL-MCNC: 67 UG/DL (ref 35–150)
LYMPHOCYTES # BLD: 1.1 K/UL (ref 0.8–3.5)
LYMPHOCYTES NFR BLD: 30 % (ref 12–49)
MCH RBC QN AUTO: 28 PG (ref 26–34)
MCHC RBC AUTO-ENTMCNC: 31.8 G/DL (ref 30–36.5)
MCV RBC AUTO: 88.1 FL (ref 80–99)
MONOCYTES # BLD: 0.6 K/UL (ref 0–1)
MONOCYTES NFR BLD: 16 % (ref 5–13)
NEUTS SEG # BLD: 1.8 K/UL (ref 1.8–8)
NEUTS SEG NFR BLD: 46 % (ref 32–75)
NRBC # BLD: 0 K/UL (ref 0–0.01)
NRBC BLD-RTO: 0 PER 100 WBC
PHOSPHATE SERPL-MCNC: 3.6 MG/DL (ref 2.6–4.7)
PLATELET # BLD AUTO: 189 K/UL (ref 150–400)
PMV BLD AUTO: 9.4 FL (ref 8.9–12.9)
POTASSIUM SERPL-SCNC: 3.8 MMOL/L (ref 3.5–5.1)
RBC # BLD AUTO: 3.18 M/UL (ref 3.8–5.2)
SODIUM SERPL-SCNC: 140 MMOL/L (ref 136–145)
TIBC SERPL-MCNC: 215 UG/DL (ref 250–450)
WBC # BLD AUTO: 3.7 K/UL (ref 3.6–11)

## 2019-07-23 PROCEDURE — 74011250636 HC RX REV CODE- 250/636: Performed by: INTERNAL MEDICINE

## 2019-07-23 PROCEDURE — 36415 COLL VENOUS BLD VENIPUNCTURE: CPT

## 2019-07-23 PROCEDURE — 96372 THER/PROPH/DIAG INJ SC/IM: CPT

## 2019-07-23 PROCEDURE — 83540 ASSAY OF IRON: CPT

## 2019-07-23 PROCEDURE — 80069 RENAL FUNCTION PANEL: CPT

## 2019-07-23 PROCEDURE — 85025 COMPLETE CBC W/AUTO DIFF WBC: CPT

## 2019-07-23 RX ADMIN — EPOETIN ALFA-EPBX 30000 UNITS: 40000 INJECTION, SOLUTION INTRAVENOUS; SUBCUTANEOUS at 14:28

## 2019-07-23 NOTE — PROGRESS NOTES
[facility] Progress Note    Date: 2019    Name: Roma Lutz    MRN: [id number]         : 1945    Ms. Alvaro Salas Arrived ambulatory and in no distress for Procrit Regimen. Assessment was completed, no acute issues at this time, no new complaints voiced. Labs drawn peripherally & sent for processing. Ms. Roxanna Zhang vitals were reviewed. Patient Vitals for the past 12 hrs:   Temp Pulse Resp BP SpO2   19 1351 97.8 °F (36.6 °C) 83 18 113/57 95 %       Lab results were obtained and reviewed. Recent Results (from the past 12 hour(s))   CBC WITH AUTOMATED DIFF    Collection Time: 19  1:51 PM   Result Value Ref Range    WBC 3.7 3.6 - 11.0 K/uL    RBC 3.18 (L) 3.80 - 5.20 M/uL    HGB 8.9 (L) 11.5 - 16.0 g/dL    HCT 28.0 (L) 35.0 - 47.0 %    MCV 88.1 80.0 - 99.0 FL    MCH 28.0 26.0 - 34.0 PG    MCHC 31.8 30.0 - 36.5 g/dL    RDW 13.5 11.5 - 14.5 %    PLATELET 429 665 - 015 K/uL    MPV 9.4 8.9 - 12.9 FL    NRBC 0.0 0  WBC    ABSOLUTE NRBC 0.00 0.00 - 0.01 K/uL    NEUTROPHILS 46 32 - 75 %    LYMPHOCYTES 30 12 - 49 %    MONOCYTES 16 (H) 5 - 13 %    EOSINOPHILS 6 0 - 7 %    BASOPHILS 1 0 - 1 %    IMMATURE GRANULOCYTES 1 (H) 0.0 - 0.5 %    ABS. NEUTROPHILS 1.8 1.8 - 8.0 K/UL    ABS. LYMPHOCYTES 1.1 0.8 - 3.5 K/UL    ABS. MONOCYTES 0.6 0.0 - 1.0 K/UL    ABS. EOSINOPHILS 0.2 0.0 - 0.4 K/UL    ABS. BASOPHILS 0.0 0.0 - 0.1 K/UL    ABS. IMM. GRANS. 0.0 0.00 - 0.04 K/UL    DF AUTOMATED           Medications:  Medications Administered     epoetin malcolm-epbx (RETACRIT) injection 30,000 Units     Admin Date  2019 Action  Given Dose  [id number] Units Route  SubCUTAneous Administered By  Lei Lam RN                  Ms. Alvaro Salas tolerated treatment well and was discharged from [place] [address] in stable condition. She is to return on 19 at 2:00 for her next appointment.     Enrique Lowe RN  2019

## 2019-08-20 ENCOUNTER — HOSPITAL ENCOUNTER (OUTPATIENT)
Dept: INFUSION THERAPY | Age: 74
Discharge: HOME | End: 2019-08-20
Payer: MEDICARE

## 2019-08-20 VITALS
DIASTOLIC BLOOD PRESSURE: 74 MMHG | OXYGEN SATURATION: 96 % | TEMPERATURE: 97.5 F | SYSTOLIC BLOOD PRESSURE: 132 MMHG | HEART RATE: 85 BPM | RESPIRATION RATE: 18 BRPM

## 2019-08-20 LAB
ALBUMIN SERPL-MCNC: 3.6 G/DL (ref 3.5–5)
ANION GAP SERPL CALC-SCNC: 6 MMOL/L (ref 5–15)
BASO+EOS+MONOS # BLD AUTO: 0.5 K/UL (ref 0.2–1.2)
BASO+EOS+MONOS NFR BLD AUTO: 14 % (ref 3.2–16.9)
BUN SERPL-MCNC: 31 MG/DL (ref 6–20)
BUN/CREAT SERPL: 7 (ref 12–20)
CALCIUM SERPL-MCNC: 8.2 MG/DL (ref 8.5–10.1)
CALCIUM SERPL-MCNC: 8.4 MG/DL (ref 8.5–10.1)
CHLORIDE SERPL-SCNC: 109 MMOL/L (ref 97–108)
CO2 SERPL-SCNC: 27 MMOL/L (ref 21–32)
CREAT SERPL-MCNC: 4.46 MG/DL (ref 0.55–1.02)
DIFFERENTIAL METHOD BLD: ABNORMAL
ERYTHROCYTE [DISTWIDTH] IN BLOOD BY AUTOMATED COUNT: 14.2 % (ref 11.8–15.8)
FERRITIN SERPL-MCNC: 331 NG/ML (ref 26–388)
GLUCOSE SERPL-MCNC: 188 MG/DL (ref 65–100)
HCT VFR BLD AUTO: 26.8 % (ref 35–47)
HGB BLD-MCNC: 9 G/DL (ref 11.5–16)
IRON SATN MFR SERPL: 38 % (ref 20–50)
IRON SERPL-MCNC: 75 UG/DL (ref 35–150)
LYMPHOCYTES # BLD: 1.2 K/UL (ref 0.8–3.5)
LYMPHOCYTES NFR BLD: 35 % (ref 12–49)
MCH RBC QN AUTO: 29.4 PG (ref 26–34)
MCHC RBC AUTO-ENTMCNC: 33.6 G/DL (ref 30–36.5)
MCV RBC AUTO: 87.6 FL (ref 80–99)
NEUTS SEG # BLD: 1.8 K/UL (ref 1.8–8)
NEUTS SEG NFR BLD: 51 % (ref 32–75)
PHOSPHATE SERPL-MCNC: 4.7 MG/DL (ref 2.6–4.7)
PLATELET # BLD AUTO: 211 K/UL (ref 150–400)
POTASSIUM SERPL-SCNC: 4.2 MMOL/L (ref 3.5–5.1)
PTH-INTACT SERPL-MCNC: 336.9 PG/ML (ref 18.4–88)
RBC # BLD AUTO: 3.06 M/UL (ref 3.8–5.2)
SODIUM SERPL-SCNC: 142 MMOL/L (ref 136–145)
TIBC SERPL-MCNC: 200 UG/DL (ref 250–450)
WBC # BLD AUTO: 3.5 K/UL (ref 3.6–11)

## 2019-08-20 PROCEDURE — 74011250636 HC RX REV CODE- 250/636: Performed by: INTERNAL MEDICINE

## 2019-08-20 PROCEDURE — 83970 ASSAY OF PARATHORMONE: CPT

## 2019-08-20 PROCEDURE — 36415 COLL VENOUS BLD VENIPUNCTURE: CPT

## 2019-08-20 PROCEDURE — 85025 COMPLETE CBC W/AUTO DIFF WBC: CPT

## 2019-08-20 PROCEDURE — 83540 ASSAY OF IRON: CPT

## 2019-08-20 PROCEDURE — 96372 THER/PROPH/DIAG INJ SC/IM: CPT

## 2019-08-20 PROCEDURE — 80069 RENAL FUNCTION PANEL: CPT

## 2019-08-20 PROCEDURE — 82728 ASSAY OF FERRITIN: CPT

## 2019-08-20 RX ADMIN — EPOETIN ALFA-EPBX 30000 UNITS: 40000 INJECTION, SOLUTION INTRAVENOUS; SUBCUTANEOUS at 14:21

## 2019-08-20 NOTE — PROGRESS NOTES
Outpatient Infusion Center Short Visit Progress Note    2690  Patient admitted to [facility] for R Modestoinho 56 ambulatory in stable condition. Assessment completed. Patient c/o fatigue and occasional dizziness. Labs drawn peripherally from right [facility] by Sidra James Phlebotomist.  Vital Signs:  Visit Vitals  /74   Pulse 85   Temp 97.5 °F (36.4 °C)   Resp 18   SpO2 96%   Breastfeeding? No         Lab Results:  Recent Results (from the past 12 hour(s))   CBC WITH 3 PART DIFF    Collection Time: 08/20/19  2:00 PM   Result Value Ref Range    WBC 3.5 (L) 3.6 - 11.0 K/uL    RBC 3.06 (L) 3.80 - 5.20 M/uL    HGB 9.0 (L) 11.5 - 16.0 g/dL    HCT 26.8 (L) 35.0 - 47.0 %    MCV 87.6 80.0 - 99.0 FL    MCH 29.4 26.0 - 34.0 PG    MCHC 33.6 30.0 - 36.5 g/dL    RDW 14.2 11.8 - 15.8 %    PLATELET 299 603 - 265 K/uL    NEUTROPHILS 51 32 - 75 %    MIXED CELLS 14 3.2 - 16.9 %    LYMPHOCYTES 35 12 - 49 %    ABS. NEUTROPHILS 1.8 1.8 - 8.0 K/UL    ABS. MIXED CELLS 0.5 0.2 - 1.2 K/uL    ABS. LYMPHOCYTES 1.2 0.8 - 3.5 K/UL    DF AUTOMATED       Labs within treatment parameters. Medications:  Medications Administered     epoetin malcolm-epbx (RETACRIT) injection 30,000 Units     Admin Date  08/20/2019 Action  Given Dose  [id number] Units Route  SubCUTAneous Administered By  Laisha Espinoza, RN                0971 Patient tolerated treatment well. Patient discharged from [facility] 58 ambulatory in no distress at 1415. Patient aware of next appointment.     Future Appointments   Date Time Provider Tootie Hudson   9/17/2019  2:00 PM [facility]   10/15/2019  2:00 PM [facility]   11/12/2019  2:00 PM [facility] [address] [place] [address]

## 2019-09-17 ENCOUNTER — HOSPITAL ENCOUNTER (OUTPATIENT)
Dept: INFUSION THERAPY | Age: 74
Discharge: HOME | End: 2019-09-17
Payer: MEDICARE

## 2019-09-17 VITALS
OXYGEN SATURATION: 95 % | SYSTOLIC BLOOD PRESSURE: 142 MMHG | TEMPERATURE: 97.6 F | HEART RATE: 84 BPM | RESPIRATION RATE: 18 BRPM | DIASTOLIC BLOOD PRESSURE: 77 MMHG

## 2019-09-17 LAB
ALBUMIN SERPL-MCNC: 3.4 G/DL (ref 3.5–5)
ANION GAP SERPL CALC-SCNC: 6 MMOL/L (ref 5–15)
BASO+EOS+MONOS # BLD AUTO: 0.5 K/UL (ref 0.2–1.2)
BASO+EOS+MONOS NFR BLD AUTO: 15 % (ref 3.2–16.9)
BUN SERPL-MCNC: 48 MG/DL (ref 6–20)
BUN/CREAT SERPL: 11 (ref 12–20)
CALCIUM SERPL-MCNC: 8.6 MG/DL (ref 8.5–10.1)
CHLORIDE SERPL-SCNC: 109 MMOL/L (ref 97–108)
CO2 SERPL-SCNC: 25 MMOL/L (ref 21–32)
CREAT SERPL-MCNC: 4.45 MG/DL (ref 0.55–1.02)
DIFFERENTIAL METHOD BLD: ABNORMAL
ERYTHROCYTE [DISTWIDTH] IN BLOOD BY AUTOMATED COUNT: 14 % (ref 11.8–15.8)
GLUCOSE SERPL-MCNC: 185 MG/DL (ref 65–100)
HCT VFR BLD AUTO: 27.3 % (ref 35–47)
HGB BLD-MCNC: 9.2 G/DL (ref 11.5–16)
IRON SATN MFR SERPL: 35 % (ref 20–50)
IRON SERPL-MCNC: 68 UG/DL (ref 35–150)
LYMPHOCYTES # BLD: 1.3 K/UL (ref 0.8–3.5)
LYMPHOCYTES NFR BLD: 35 % (ref 12–49)
MCH RBC QN AUTO: 29.4 PG (ref 26–34)
MCHC RBC AUTO-ENTMCNC: 33.7 G/DL (ref 30–36.5)
MCV RBC AUTO: 87.2 FL (ref 80–99)
NEUTS SEG # BLD: 1.9 K/UL (ref 1.8–8)
NEUTS SEG NFR BLD: 51 % (ref 32–75)
PHOSPHATE SERPL-MCNC: 3.8 MG/DL (ref 2.6–4.7)
PLATELET # BLD AUTO: 270 K/UL (ref 150–400)
POTASSIUM SERPL-SCNC: 4.4 MMOL/L (ref 3.5–5.1)
RBC # BLD AUTO: 3.13 M/UL (ref 3.8–5.2)
SODIUM SERPL-SCNC: 140 MMOL/L (ref 136–145)
TIBC SERPL-MCNC: 195 UG/DL (ref 250–450)
WBC # BLD AUTO: 3.7 K/UL (ref 3.6–11)

## 2019-09-17 PROCEDURE — 74011250636 HC RX REV CODE- 250/636: Performed by: INTERNAL MEDICINE

## 2019-09-17 PROCEDURE — 96372 THER/PROPH/DIAG INJ SC/IM: CPT

## 2019-09-17 PROCEDURE — 36415 COLL VENOUS BLD VENIPUNCTURE: CPT

## 2019-09-17 PROCEDURE — 83540 ASSAY OF IRON: CPT

## 2019-09-17 PROCEDURE — 85025 COMPLETE CBC W/AUTO DIFF WBC: CPT

## 2019-09-17 PROCEDURE — 80069 RENAL FUNCTION PANEL: CPT

## 2019-09-17 RX ADMIN — EPOETIN ALFA-EPBX 30000 UNITS: 40000 INJECTION, SOLUTION INTRAVENOUS; SUBCUTANEOUS at 14:40

## 2019-09-17 NOTE — PROGRESS NOTES
Outpatient Infusion Center Short Visit Progress Note    1350  Patient admitted to [address] [place] [address] for R Azar 56 ambulatory in stable condition. Assessment completed. Patient c/o back pain. Labs drawn peripherally by Celestina Lennox, PCT, and sent for processing. Vital Signs:  Visit Vitals  /77   Pulse 84   Temp 97.6 °F (36.4 °C)   Resp 18   SpO2 95%   Breastfeeding? No       Lab Results:  Recent Results (from the past 12 hour(s))   CBC WITH 3 PART DIFF    Collection Time: 09/17/19  2:27 PM   Result Value Ref Range    WBC 3.7 3.6 - 11.0 K/uL    RBC 3.13 (L) 3.80 - 5.20 M/uL    HGB 9.2 (L) 11.5 - 16.0 g/dL    HCT 27.3 (L) 35.0 - 47.0 %    MCV 87.2 80.0 - 99.0 FL    MCH 29.4 26.0 - 34.0 PG    MCHC 33.7 30.0 - 36.5 g/dL    RDW 14.0 11.8 - 15.8 %    PLATELET 446 468 - 627 K/uL    NEUTROPHILS 51 32 - 75 %    MIXED CELLS 15 3.2 - 16.9 %    LYMPHOCYTES 35 12 - 49 %    ABS. NEUTROPHILS 1.9 1.8 - 8.0 K/UL    ABS. MIXED CELLS 0.5 0.2 - 1.2 K/uL    ABS. LYMPHOCYTES 1.3 0.8 - 3.5 K/UL    DF AUTOMATED       Labs within treatment parameters. Other labs pending in CC. Medications:  Medications Administered     epoetin malcolm-epbx (RETACRIT) injection 30,000 Units     Admin Date  09/17/2019 Action  Given Dose  [id number] Units Route  SubCUTAneous Administered By  Meghan Jones RN              Administered in left arm. 1440 Patient tolerated treatment well. Patient discharged from [facility] 58 ambulatory in no distress at 1440. Patient aware of next appointment.     Future Appointments   Date Time Provider Tootie Hudson   10/15/2019  2:00 PM [facility]   11/12/2019  2:00 PM SS INF5 CH4 <1H [facility]   12/10/2019  2:00 PM SS INF5 CH4 <1H [facility]   1/7/2020  2:00 PM SS INF5 CH4 <1H [facility]

## 2019-10-15 ENCOUNTER — HOSPITAL ENCOUNTER (OUTPATIENT)
Dept: INFUSION THERAPY | Age: 74
Discharge: HOME | End: 2019-10-15
Payer: MEDICARE

## 2019-10-15 VITALS
RESPIRATION RATE: 18 BRPM | SYSTOLIC BLOOD PRESSURE: 158 MMHG | WEIGHT: 230 LBS | OXYGEN SATURATION: 98 % | HEART RATE: 83 BPM | DIASTOLIC BLOOD PRESSURE: 74 MMHG | TEMPERATURE: 98.1 F

## 2019-10-15 LAB
ALBUMIN SERPL-MCNC: 3.5 G/DL (ref 3.5–5)
ANION GAP SERPL CALC-SCNC: 7 MMOL/L (ref 5–15)
BASOPHILS # BLD: 0 K/UL (ref 0–0.1)
BASOPHILS NFR BLD: 1 % (ref 0–1)
BUN SERPL-MCNC: 49 MG/DL (ref 6–20)
BUN/CREAT SERPL: 10 (ref 12–20)
CALCIUM SERPL-MCNC: 8.9 MG/DL (ref 8.5–10.1)
CHLORIDE SERPL-SCNC: 108 MMOL/L (ref 97–108)
CO2 SERPL-SCNC: 24 MMOL/L (ref 21–32)
CREAT SERPL-MCNC: 4.72 MG/DL (ref 0.55–1.02)
DIFFERENTIAL METHOD BLD: ABNORMAL
EOSINOPHIL # BLD: 0.2 K/UL (ref 0–0.4)
EOSINOPHIL NFR BLD: 5 % (ref 0–7)
ERYTHROCYTE [DISTWIDTH] IN BLOOD BY AUTOMATED COUNT: 13.8 % (ref 11.5–14.5)
GLUCOSE SERPL-MCNC: 190 MG/DL (ref 65–100)
HCT VFR BLD AUTO: 30.6 % (ref 35–47)
HGB BLD-MCNC: 9.8 G/DL (ref 11.5–16)
IMM GRANULOCYTES # BLD AUTO: 0 K/UL (ref 0–0.04)
IMM GRANULOCYTES NFR BLD AUTO: 0 % (ref 0–0.5)
LYMPHOCYTES # BLD: 1.1 K/UL (ref 0.8–3.5)
LYMPHOCYTES NFR BLD: 31 % (ref 12–49)
MCH RBC QN AUTO: 28.5 PG (ref 26–34)
MCHC RBC AUTO-ENTMCNC: 32 G/DL (ref 30–36.5)
MCV RBC AUTO: 89 FL (ref 80–99)
MONOCYTES # BLD: 0.5 K/UL (ref 0–1)
MONOCYTES NFR BLD: 14 % (ref 5–13)
NEUTS SEG # BLD: 1.8 K/UL (ref 1.8–8)
NEUTS SEG NFR BLD: 49 % (ref 32–75)
NRBC # BLD: 0 K/UL (ref 0–0.01)
NRBC BLD-RTO: 0 PER 100 WBC
PHOSPHATE SERPL-MCNC: 3.7 MG/DL (ref 2.6–4.7)
PLATELET # BLD AUTO: 210 K/UL (ref 150–400)
PMV BLD AUTO: 9.1 FL (ref 8.9–12.9)
POTASSIUM SERPL-SCNC: 4.3 MMOL/L (ref 3.5–5.1)
RBC # BLD AUTO: 3.44 M/UL (ref 3.8–5.2)
SODIUM SERPL-SCNC: 139 MMOL/L (ref 136–145)
WBC # BLD AUTO: 3.6 K/UL (ref 3.6–11)

## 2019-10-15 PROCEDURE — 74011250636 HC RX REV CODE- 250/636: Performed by: INTERNAL MEDICINE

## 2019-10-15 PROCEDURE — 36415 COLL VENOUS BLD VENIPUNCTURE: CPT

## 2019-10-15 PROCEDURE — 96372 THER/PROPH/DIAG INJ SC/IM: CPT

## 2019-10-15 PROCEDURE — 83540 ASSAY OF IRON: CPT

## 2019-10-15 PROCEDURE — 85025 COMPLETE CBC W/AUTO DIFF WBC: CPT

## 2019-10-15 PROCEDURE — 80069 RENAL FUNCTION PANEL: CPT

## 2019-10-15 RX ADMIN — EPOETIN ALFA-EPBX 40000 UNITS: 40000 INJECTION, SOLUTION INTRAVENOUS; SUBCUTANEOUS at 14:53

## 2019-10-15 NOTE — PROGRESS NOTES
Outpatient Infusion Center   Visit Progress Note    Patient admitted to [facility] for Retacrit Injection ambulatory in stable condition. Assessment completed. Pt teary eyed, struggling with personal issues and is somnolent. Emotionally supported patient, provided tissues, encouraged patient to reach out to community resources for support. Visit Vitals  /74 (BP 1 Location: Right arm, BP Patient Position: At rest)   Pulse 83   Temp 98.1 °F (36.7 °C)   Resp 18   Wt 104.3 kg (230 lb)   SpO2 98%       Medications:  Medications Administered     epoetin malcolm-epbx (RETACRIT) injection 40,000 Units     Admin Date  10/15/2019 Action  Given Dose  [id number] Units Route  SubCUTAneous Administered By  Danny Goldman RN                Recent Results (from the past 8 hour(s))   CBC WITH AUTOMATED DIFF    Collection Time: 10/15/19  2:10 PM   Result Value Ref Range    WBC 3.6 3.6 - 11.0 K/uL    RBC 3.44 (L) 3.80 - 5.20 M/uL    HGB 9.8 (L) 11.5 - 16.0 g/dL    HCT 30.6 (L) 35.0 - 47.0 %    MCV 89.0 80.0 - 99.0 FL    MCH 28.5 26.0 - 34.0 PG    MCHC 32.0 30.0 - 36.5 g/dL    RDW 13.8 11.5 - 14.5 %    PLATELET 725 045 - 654 K/uL    MPV 9.1 8.9 - 12.9 FL    NRBC 0.0 0  WBC    ABSOLUTE NRBC 0.00 0.00 - 0.01 K/uL    NEUTROPHILS 49 32 - 75 %    LYMPHOCYTES 31 12 - 49 %    MONOCYTES 14 (H) 5 - 13 %    EOSINOPHILS 5 0 - 7 %    BASOPHILS 1 0 - 1 %    IMMATURE GRANULOCYTES 0 0.0 - 0.5 %    ABS. NEUTROPHILS 1.8 1.8 - 8.0 K/UL    ABS. LYMPHOCYTES 1.1 0.8 - 3.5 K/UL    ABS. MONOCYTES 0.5 0.0 - 1.0 K/UL    ABS. EOSINOPHILS 0.2 0.0 - 0.4 K/UL    ABS. BASOPHILS 0.0 0.0 - 0.1 K/UL    ABS. IMM. GRANS. 0.0 0.00 - 0.04 K/UL    DF AUTOMATED         Patient tolerated treatment well. Patient discharged from [facility] 58 ambulatory in no distress. Patient aware of next appointment on 11/12/2019.

## 2019-10-16 LAB
IRON SATN MFR SERPL: 37 % (ref 20–50)
IRON SERPL-MCNC: 79 UG/DL (ref 35–150)
TIBC SERPL-MCNC: 213 UG/DL (ref 250–450)

## 2019-11-12 ENCOUNTER — HOSPITAL ENCOUNTER (OUTPATIENT)
Dept: INFUSION THERAPY | Age: 74
Discharge: HOME | End: 2019-11-12
Payer: MEDICARE

## 2019-11-12 VITALS
HEART RATE: 82 BPM | RESPIRATION RATE: 18 BRPM | OXYGEN SATURATION: 93 % | DIASTOLIC BLOOD PRESSURE: 67 MMHG | SYSTOLIC BLOOD PRESSURE: 151 MMHG | TEMPERATURE: 98.1 F

## 2019-11-12 LAB
ALBUMIN SERPL-MCNC: 3.6 G/DL (ref 3.5–5)
ANION GAP SERPL CALC-SCNC: 10 MMOL/L (ref 5–15)
BASOPHILS # BLD: 0 K/UL (ref 0–0.1)
BASOPHILS NFR BLD: 1 % (ref 0–1)
BUN SERPL-MCNC: 42 MG/DL (ref 6–20)
BUN/CREAT SERPL: 8 (ref 12–20)
CALCIUM SERPL-MCNC: 9.4 MG/DL (ref 8.5–10.1)
CALCIUM SERPL-MCNC: 9.4 MG/DL (ref 8.5–10.1)
CHLORIDE SERPL-SCNC: 105 MMOL/L (ref 97–108)
CO2 SERPL-SCNC: 25 MMOL/L (ref 21–32)
CREAT SERPL-MCNC: 5.25 MG/DL (ref 0.55–1.02)
DIFFERENTIAL METHOD BLD: ABNORMAL
EOSINOPHIL # BLD: 0.2 K/UL (ref 0–0.4)
EOSINOPHIL NFR BLD: 5 % (ref 0–7)
ERYTHROCYTE [DISTWIDTH] IN BLOOD BY AUTOMATED COUNT: 14.1 % (ref 11.5–14.5)
FERRITIN SERPL-MCNC: 428 NG/ML (ref 8–252)
GLUCOSE SERPL-MCNC: 148 MG/DL (ref 65–100)
HCT VFR BLD AUTO: 32.2 % (ref 35–47)
HGB BLD-MCNC: 10.1 G/DL (ref 11.5–16)
IMM GRANULOCYTES # BLD AUTO: 0 K/UL (ref 0–0.04)
IMM GRANULOCYTES NFR BLD AUTO: 0 % (ref 0–0.5)
IRON SATN MFR SERPL: 31 % (ref 20–50)
IRON SERPL-MCNC: 66 UG/DL (ref 35–150)
LYMPHOCYTES # BLD: 1.3 K/UL (ref 0.8–3.5)
LYMPHOCYTES NFR BLD: 28 % (ref 12–49)
MCH RBC QN AUTO: 27.8 PG (ref 26–34)
MCHC RBC AUTO-ENTMCNC: 31.4 G/DL (ref 30–36.5)
MCV RBC AUTO: 88.7 FL (ref 80–99)
MONOCYTES # BLD: 0.6 K/UL (ref 0–1)
MONOCYTES NFR BLD: 13 % (ref 5–13)
NEUTS SEG # BLD: 2.4 K/UL (ref 1.8–8)
NEUTS SEG NFR BLD: 53 % (ref 32–75)
NRBC # BLD: 0 K/UL (ref 0–0.01)
NRBC BLD-RTO: 0 PER 100 WBC
PHOSPHATE SERPL-MCNC: 4.5 MG/DL (ref 2.6–4.7)
PLATELET # BLD AUTO: 248 K/UL (ref 150–400)
PMV BLD AUTO: 8.8 FL (ref 8.9–12.9)
POTASSIUM SERPL-SCNC: 4.1 MMOL/L (ref 3.5–5.1)
PTH-INTACT SERPL-MCNC: 88.9 PG/ML (ref 18.4–88)
RBC # BLD AUTO: 3.63 M/UL (ref 3.8–5.2)
SODIUM SERPL-SCNC: 140 MMOL/L (ref 136–145)
TIBC SERPL-MCNC: 212 UG/DL (ref 250–450)
WBC # BLD AUTO: 4.6 K/UL (ref 3.6–11)

## 2019-11-12 PROCEDURE — 96372 THER/PROPH/DIAG INJ SC/IM: CPT

## 2019-11-12 PROCEDURE — 74011250636 HC RX REV CODE- 250/636: Performed by: INTERNAL MEDICINE

## 2019-11-12 PROCEDURE — 82728 ASSAY OF FERRITIN: CPT

## 2019-11-12 PROCEDURE — 83540 ASSAY OF IRON: CPT

## 2019-11-12 PROCEDURE — 36415 COLL VENOUS BLD VENIPUNCTURE: CPT

## 2019-11-12 PROCEDURE — 83970 ASSAY OF PARATHORMONE: CPT

## 2019-11-12 PROCEDURE — 80069 RENAL FUNCTION PANEL: CPT

## 2019-11-12 PROCEDURE — 85025 COMPLETE CBC W/AUTO DIFF WBC: CPT

## 2019-11-12 RX ADMIN — EPOETIN ALFA-EPBX 40000 UNITS: 40000 INJECTION, SOLUTION INTRAVENOUS; SUBCUTANEOUS at 14:38

## 2019-11-12 NOTE — PROGRESS NOTES
[facility] SHORT CONSULT VISIT    1400  Pt arrived at [facility] ambulatory and in no distress for Retacrit therapy. Assessment completed, pt had no complaints. Labs drawn peripherally from L AC. HGB 10.1 and HCT 32.2. Patient Vitals for the past 12 hrs:   Temp Pulse Resp BP SpO2   11/12/19 1401 98.1 °F (36.7 °C) 82 18 151/67 93 %       Recent Results (from the past 12 hour(s))   CBC WITH AUTOMATED DIFF    Collection Time: 11/12/19  2:13 PM   Result Value Ref Range    WBC 4.6 3.6 - 11.0 K/uL    RBC 3.63 (L) 3.80 - 5.20 M/uL    HGB 10.1 (L) 11.5 - 16.0 g/dL    HCT 32.2 (L) 35.0 - 47.0 %    MCV 88.7 80.0 - 99.0 FL    MCH 27.8 26.0 - 34.0 PG    MCHC 31.4 30.0 - 36.5 g/dL    RDW 14.1 11.5 - 14.5 %    PLATELET 052 933 - 533 K/uL    MPV 8.8 (L) 8.9 - 12.9 FL    NRBC 0.0 0  WBC    ABSOLUTE NRBC 0.00 0.00 - 0.01 K/uL    NEUTROPHILS 53 32 - 75 %    LYMPHOCYTES 28 12 - 49 %    MONOCYTES 13 5 - 13 %    EOSINOPHILS 5 0 - 7 %    BASOPHILS 1 0 - 1 %    IMMATURE GRANULOCYTES 0 0.0 - 0.5 %    ABS. NEUTROPHILS 2.4 1.8 - 8.0 K/UL    ABS. LYMPHOCYTES 1.3 0.8 - 3.5 K/UL    ABS. MONOCYTES 0.6 0.0 - 1.0 K/UL    ABS. EOSINOPHILS 0.2 0.0 - 0.4 K/UL    ABS. BASOPHILS 0.0 0.0 - 0.1 K/UL    ABS. IMM.  GRANS. 0.0 0.00 - 0.04 K/UL    DF AUTOMATED     RENAL FUNCTION PANEL    Collection Time: 11/12/19  2:13 PM   Result Value Ref Range    Sodium 140 136 - 145 mmol/L    Potassium 4.1 3.5 - 5.1 mmol/L    Chloride 105 97 - 108 mmol/L    CO2 25 21 - 32 mmol/L    Anion gap 10 5 - 15 mmol/L    Glucose 148 (H) 65 - 100 mg/dL    BUN 42 (H) 6 - 20 MG/DL    Creatinine 5.25 (H) 0.55 - 1.02 MG/DL    BUN/Creatinine ratio 8 (L) 12 - 20      GFR est AA 10 (L) >60 ml/min/1.73m2    GFR est non-AA 8 (L) >60 ml/min/1.73m2    Calcium 9.4 8.5 - 10.1 MG/DL    Phosphorus 4.5 2.6 - 4.7 MG/DL    Albumin 3.6 3.5 - 5.0 g/dL         Medications received:  Medications Administered     epoetin malcolm-epbx (RETACRIT) injection 40,000 Units     Admin Date  11/12/2019 Action  Given Dose  [id number] Units Route  SubCUTAneous Administered By  Devorah Rogers treatment well, no adverse reaction noted. Patient was notified that her next appointment in 12/10/19  @ 1400.     1440  D/C'd from [facility] ambulatory and in no distress.      Future Appointments   Date Time Provider Tootie Hudson   12/10/2019  2:00 PM [facility]   1/7/2020  2:00 PM [facility]

## 2019-12-09 NOTE — PROGRESS NOTES
[facility] Progress Note    Date: 2019    Name: Mario Allen    MRN: [id number]         : 1945    Ms. Hudson Saunders Arrived ambulatory and in no distress for Retacrit Regimen. Assessment was completed, no acute issues at this time, no new complaints voiced. Labs drawn peripherally & sent for processing. Ms. Santino Rivera vitals were reviewed. Patient Vitals for the past 12 hrs:   Temp Pulse Resp BP SpO2   12/10/19 1346 98.6 °F (37 °C) 86 18 154/83 98 %     Lab results were obtained and reviewed. Recent Results (from the past 12 hour(s))   CBC WITH 3 PART DIFF    Collection Time: 12/10/19  1:50 PM   Result Value Ref Range    WBC 5.1 3.6 - 11.0 K/uL    RBC 3.64 (L) 3.80 - 5.20 M/uL    HGB 10.2 (L) 11.5 - 16.0 g/dL    HCT 31.5 (L) 35.0 - 47.0 %    MCV 86.5 80.0 - 99.0 FL    MCH 28.0 26.0 - 34.0 PG    MCHC 32.4 30.0 - 36.5 g/dL    RDW 14.5 11.8 - 15.8 %    PLATELET 119 763 - 010 K/uL    NEUTROPHILS 64 32 - 75 %    MIXED CELLS 10 3.2 - 16.9 %    LYMPHOCYTES 26 12 - 49 %    ABS. NEUTROPHILS 3.3 1.8 - 8.0 K/UL    ABS. MIXED CELLS 0.5 0.2 - 1.2 K/uL    ABS. LYMPHOCYTES 1.3 0.8 - 3.5 K/UL    DF AUTOMATED         Medications:  Retacrit SQ    Ms. Nugent tolerated treatment well and was discharged from [place] [address] in stable condition. She is to return on 20 at 2:00 for her next appointment.     Marixa Flores RN  2019

## 2019-12-10 ENCOUNTER — HOSPITAL ENCOUNTER (OUTPATIENT)
Dept: INFUSION THERAPY | Age: 74
Discharge: HOME | End: 2019-12-10
Payer: MEDICARE

## 2019-12-10 VITALS
HEART RATE: 86 BPM | SYSTOLIC BLOOD PRESSURE: 154 MMHG | DIASTOLIC BLOOD PRESSURE: 83 MMHG | TEMPERATURE: 98.6 F | OXYGEN SATURATION: 98 % | RESPIRATION RATE: 18 BRPM

## 2019-12-10 LAB
ALBUMIN SERPL-MCNC: 3.6 G/DL (ref 3.5–5)
ANION GAP SERPL CALC-SCNC: 5 MMOL/L (ref 5–15)
BASO+EOS+MONOS # BLD AUTO: 0.5 K/UL (ref 0.2–1.2)
BASO+EOS+MONOS NFR BLD AUTO: 10 % (ref 3.2–16.9)
BUN SERPL-MCNC: 48 MG/DL (ref 6–20)
BUN/CREAT SERPL: 9 (ref 12–20)
CALCIUM SERPL-MCNC: 9.5 MG/DL (ref 8.5–10.1)
CHLORIDE SERPL-SCNC: 109 MMOL/L (ref 97–108)
CO2 SERPL-SCNC: 27 MMOL/L (ref 21–32)
CREAT SERPL-MCNC: 5.37 MG/DL (ref 0.55–1.02)
DIFFERENTIAL METHOD BLD: ABNORMAL
ERYTHROCYTE [DISTWIDTH] IN BLOOD BY AUTOMATED COUNT: 14.5 % (ref 11.8–15.8)
GLUCOSE SERPL-MCNC: 116 MG/DL (ref 65–100)
HCT VFR BLD AUTO: 31.5 % (ref 35–47)
HGB BLD-MCNC: 10.2 G/DL (ref 11.5–16)
IRON SATN MFR SERPL: 45 % (ref 20–50)
IRON SERPL-MCNC: 105 UG/DL (ref 35–150)
LYMPHOCYTES # BLD: 1.3 K/UL (ref 0.8–3.5)
LYMPHOCYTES NFR BLD: 26 % (ref 12–49)
MCH RBC QN AUTO: 28 PG (ref 26–34)
MCHC RBC AUTO-ENTMCNC: 32.4 G/DL (ref 30–36.5)
MCV RBC AUTO: 86.5 FL (ref 80–99)
NEUTS SEG # BLD: 3.3 K/UL (ref 1.8–8)
NEUTS SEG NFR BLD: 64 % (ref 32–75)
PHOSPHATE SERPL-MCNC: 3.4 MG/DL (ref 2.6–4.7)
PLATELET # BLD AUTO: 284 K/UL (ref 150–400)
POTASSIUM SERPL-SCNC: 4.2 MMOL/L (ref 3.5–5.1)
RBC # BLD AUTO: 3.64 M/UL (ref 3.8–5.2)
SODIUM SERPL-SCNC: 141 MMOL/L (ref 136–145)
TIBC SERPL-MCNC: 231 UG/DL (ref 250–450)
WBC # BLD AUTO: 5.1 K/UL (ref 3.6–11)

## 2019-12-10 PROCEDURE — 83540 ASSAY OF IRON: CPT

## 2019-12-10 PROCEDURE — 74011250636 HC RX REV CODE- 250/636: Performed by: INTERNAL MEDICINE

## 2019-12-10 PROCEDURE — 96372 THER/PROPH/DIAG INJ SC/IM: CPT

## 2019-12-10 PROCEDURE — 85025 COMPLETE CBC W/AUTO DIFF WBC: CPT

## 2019-12-10 PROCEDURE — 80069 RENAL FUNCTION PANEL: CPT

## 2019-12-10 PROCEDURE — 36415 COLL VENOUS BLD VENIPUNCTURE: CPT

## 2019-12-10 RX ADMIN — EPOETIN ALFA-EPBX 40000 UNITS: 40000 INJECTION, SOLUTION INTRAVENOUS; SUBCUTANEOUS at 14:16

## 2020-01-07 ENCOUNTER — HOSPITAL ENCOUNTER (OUTPATIENT)
Dept: INFUSION THERAPY | Age: 75
Discharge: HOME | End: 2020-01-07

## 2020-01-10 ENCOUNTER — HOSPITAL ENCOUNTER (OUTPATIENT)
Dept: INFUSION THERAPY | Age: 75
Discharge: HOME | End: 2020-01-10
Payer: MEDICARE

## 2020-01-10 VITALS
DIASTOLIC BLOOD PRESSURE: 77 MMHG | RESPIRATION RATE: 18 BRPM | TEMPERATURE: 97.7 F | SYSTOLIC BLOOD PRESSURE: 144 MMHG | OXYGEN SATURATION: 97 % | HEART RATE: 78 BPM

## 2020-01-10 LAB
ALBUMIN SERPL-MCNC: 3.2 G/DL (ref 3.5–5)
ANION GAP SERPL CALC-SCNC: 8 MMOL/L (ref 5–15)
BASO+EOS+MONOS # BLD AUTO: 0.7 K/UL (ref 0.2–1.2)
BASO+EOS+MONOS NFR BLD AUTO: 15 % (ref 3.2–16.9)
BUN SERPL-MCNC: 56 MG/DL (ref 6–20)
BUN/CREAT SERPL: 10 (ref 12–20)
CALCIUM SERPL-MCNC: 8.7 MG/DL (ref 8.5–10.1)
CHLORIDE SERPL-SCNC: 105 MMOL/L (ref 97–108)
CO2 SERPL-SCNC: 25 MMOL/L (ref 21–32)
CREAT SERPL-MCNC: 5.59 MG/DL (ref 0.55–1.02)
DIFFERENTIAL METHOD BLD: ABNORMAL
ERYTHROCYTE [DISTWIDTH] IN BLOOD BY AUTOMATED COUNT: 14.5 % (ref 11.8–15.8)
GLUCOSE SERPL-MCNC: 163 MG/DL (ref 65–100)
HCT VFR BLD AUTO: 28.4 % (ref 35–47)
HGB BLD-MCNC: 9.2 G/DL (ref 11.5–16)
IRON SATN MFR SERPL: 29 % (ref 20–50)
IRON SERPL-MCNC: 55 UG/DL (ref 35–150)
LYMPHOCYTES # BLD: 1.7 K/UL (ref 0.8–3.5)
LYMPHOCYTES NFR BLD: 35 % (ref 12–49)
MCH RBC QN AUTO: 27.9 PG (ref 26–34)
MCHC RBC AUTO-ENTMCNC: 32.4 G/DL (ref 30–36.5)
MCV RBC AUTO: 86.1 FL (ref 80–99)
NEUTS SEG # BLD: 2.3 K/UL (ref 1.8–8)
NEUTS SEG NFR BLD: 49 % (ref 32–75)
PHOSPHATE SERPL-MCNC: 4.7 MG/DL (ref 2.6–4.7)
PLATELET # BLD AUTO: 258 K/UL (ref 150–400)
POTASSIUM SERPL-SCNC: 3.8 MMOL/L (ref 3.5–5.1)
RBC # BLD AUTO: 3.3 M/UL (ref 3.8–5.2)
SODIUM SERPL-SCNC: 138 MMOL/L (ref 136–145)
TIBC SERPL-MCNC: 191 UG/DL (ref 250–450)
WBC # BLD AUTO: 4.7 K/UL (ref 3.6–11)

## 2020-01-10 PROCEDURE — 74011250636 HC RX REV CODE- 250/636: Performed by: INTERNAL MEDICINE

## 2020-01-10 PROCEDURE — 83540 ASSAY OF IRON: CPT

## 2020-01-10 PROCEDURE — 96372 THER/PROPH/DIAG INJ SC/IM: CPT

## 2020-01-10 PROCEDURE — 80069 RENAL FUNCTION PANEL: CPT

## 2020-01-10 PROCEDURE — 36415 COLL VENOUS BLD VENIPUNCTURE: CPT

## 2020-01-10 PROCEDURE — 85025 COMPLETE CBC W/AUTO DIFF WBC: CPT

## 2020-01-10 RX ADMIN — EPOETIN ALFA-EPBX 40000 UNITS: 40000 INJECTION, SOLUTION INTRAVENOUS; SUBCUTANEOUS at 14:42

## 2020-01-10 NOTE — PROGRESS NOTES
Outpatient Infusion Center Short Visit Progress Note    1400 Patient admitted to [facility] for Retacrit injection ambulatory in stable condition. Assessment completed. No new concerns voiced. Vital Signs:  Visit Vitals  /77   Pulse 78   Temp 97.7 °F (36.5 °C)   Resp 18   SpO2 97%     Labs drawn peripherally. Lab Results:  Recent Results (from the past 12 hour(s))   CBC WITH 3 PART DIFF    Collection Time: 01/10/20  2:06 PM   Result Value Ref Range    WBC 4.7 3.6 - 11.0 K/uL    RBC 3.30 (L) 3.80 - 5.20 M/uL    HGB 9.2 (L) 11.5 - 16.0 g/dL    HCT 28.4 (L) 35.0 - 47.0 %    MCV 86.1 80.0 - 99.0 FL    MCH 27.9 26.0 - 34.0 PG    MCHC 32.4 30.0 - 36.5 g/dL    RDW 14.5 11.8 - 15.8 %    PLATELET 461 510 - 182 K/uL    NEUTROPHILS 49 32 - 75 %    MIXED CELLS 15 3.2 - 16.9 %    LYMPHOCYTES 35 12 - 49 %    ABS. NEUTROPHILS 2.3 1.8 - 8.0 K/UL    ABS. MIXED CELLS 0.7 0.2 - 1.2 K/uL    ABS. LYMPHOCYTES 1.7 0.8 - 3.5 K/UL    DF AUTOMATED     RENAL FUNCTION PANEL    Collection Time: 01/10/20  2:06 PM   Result Value Ref Range    Sodium 138 136 - 145 mmol/L    Potassium 3.8 3.5 - 5.1 mmol/L    Chloride 105 97 - 108 mmol/L    CO2 25 21 - 32 mmol/L    Anion gap 8 5 - 15 mmol/L    Glucose 163 (H) 65 - 100 mg/dL    BUN 56 (H) 6 - 20 MG/DL    Creatinine 5.59 (H) 0.55 - 1.02 MG/DL    BUN/Creatinine ratio 10 (L) 12 - 20      GFR est AA 9 (L) >60 ml/min/1.73m2    GFR est non-AA 7 (L) >60 ml/min/1.73m2    Calcium 8.7 8.5 - 10.1 MG/DL    Phosphorus 4.7 2.6 - 4.7 MG/DL    Albumin 3.2 (L) 3.5 - 5.0 g/dL         Medications:  Medications Administered     epoetin malcolm-epbx (RETACRIT) injection 40,000 Units     Admin Date  01/10/2020 Action  Given Dose  [id number] Units Route  SubCUTAneous Administered By  Sherlyn Giang RN             Injection given in CODY SQ.    1450 Patient tolerated treatment well. Patient discharged from [facility] 58 ambulatory in no distress. Patient aware of next appointment on 2/4/20.     Earl Hicks, RN    Future Appointments   Date Time Provider Tootie Hudson   2/4/2020  2:00 PM SS INF4 CH4 <1H [address] [facility] [address]   3/3/2020  2:00 PM [facility]   3/31/2020  2:00 PM SS INF5 CH4 <1H [facility]   4/28/2020  2:00 PM SS INF5 CH4 <1H [facility]

## 2020-02-04 ENCOUNTER — HOSPITAL ENCOUNTER (OUTPATIENT)
Dept: INFUSION THERAPY | Age: 75
Discharge: HOME | End: 2020-02-04
Payer: MEDICARE

## 2020-02-04 VITALS
SYSTOLIC BLOOD PRESSURE: 141 MMHG | DIASTOLIC BLOOD PRESSURE: 66 MMHG | OXYGEN SATURATION: 94 % | HEART RATE: 80 BPM | RESPIRATION RATE: 18 BRPM | TEMPERATURE: 98.3 F

## 2020-02-04 LAB
ALBUMIN SERPL-MCNC: 3.3 G/DL (ref 3.5–5)
ANION GAP SERPL CALC-SCNC: 7 MMOL/L (ref 5–15)
BASO+EOS+MONOS # BLD AUTO: 0.7 K/UL (ref 0.2–1.2)
BASO+EOS+MONOS NFR BLD AUTO: 16 % (ref 3.2–16.9)
BUN SERPL-MCNC: 50 MG/DL (ref 6–20)
BUN/CREAT SERPL: 10 (ref 12–20)
CALCIUM SERPL-MCNC: 8.4 MG/DL (ref 8.5–10.1)
CHLORIDE SERPL-SCNC: 107 MMOL/L (ref 97–108)
CO2 SERPL-SCNC: 25 MMOL/L (ref 21–32)
CREAT SERPL-MCNC: 5.24 MG/DL (ref 0.55–1.02)
DIFFERENTIAL METHOD BLD: ABNORMAL
ERYTHROCYTE [DISTWIDTH] IN BLOOD BY AUTOMATED COUNT: 14.4 % (ref 11.8–15.8)
GLUCOSE SERPL-MCNC: 191 MG/DL (ref 65–100)
HCT VFR BLD AUTO: 30.7 % (ref 35–47)
HGB BLD-MCNC: 9.6 G/DL (ref 11.5–16)
IRON SATN MFR SERPL: 50 % (ref 20–50)
IRON SERPL-MCNC: 104 UG/DL (ref 35–150)
LYMPHOCYTES # BLD: 1.3 K/UL (ref 0.8–3.5)
LYMPHOCYTES NFR BLD: 30 % (ref 12–49)
MCH RBC QN AUTO: 27.4 PG (ref 26–34)
MCHC RBC AUTO-ENTMCNC: 31.3 G/DL (ref 30–36.5)
MCV RBC AUTO: 87.5 FL (ref 80–99)
NEUTS SEG # BLD: 2.2 K/UL (ref 1.8–8)
NEUTS SEG NFR BLD: 55 % (ref 32–75)
PHOSPHATE SERPL-MCNC: 4.4 MG/DL (ref 2.6–4.7)
PLATELET # BLD AUTO: 257 K/UL (ref 150–400)
POTASSIUM SERPL-SCNC: 4.2 MMOL/L (ref 3.5–5.1)
RBC # BLD AUTO: 3.51 M/UL (ref 3.8–5.2)
SODIUM SERPL-SCNC: 139 MMOL/L (ref 136–145)
TIBC SERPL-MCNC: 206 UG/DL (ref 250–450)
WBC # BLD AUTO: 4.2 K/UL (ref 3.6–11)

## 2020-02-04 PROCEDURE — 74011250636 HC RX REV CODE- 250/636: Performed by: INTERNAL MEDICINE

## 2020-02-04 PROCEDURE — 83540 ASSAY OF IRON: CPT

## 2020-02-04 PROCEDURE — 85025 COMPLETE CBC W/AUTO DIFF WBC: CPT

## 2020-02-04 PROCEDURE — 36415 COLL VENOUS BLD VENIPUNCTURE: CPT

## 2020-02-04 PROCEDURE — 80069 RENAL FUNCTION PANEL: CPT

## 2020-02-04 PROCEDURE — 96372 THER/PROPH/DIAG INJ SC/IM: CPT

## 2020-02-04 RX ADMIN — EPOETIN ALFA-EPBX 40000 UNITS: 40000 INJECTION, SOLUTION INTRAVENOUS; SUBCUTANEOUS at 14:18

## 2020-02-04 NOTE — PROGRESS NOTES
[facility] Progress Note    Date: 2020    Name: Sergio Bonilla    MRN: [id number]         : 1945    Ms. Stephanie Benitez Arrived ambulatory and in no distress for Retacrit Regimen. Assessment was completed, no acute issues at this time, no new complaints voiced. Labs drawn & sent for processing. Ms. Yasmin Meza vitals were reviewed. Visit Vitals  /66   Pulse 80   Temp 98.3 °F (36.8 °C)   Resp 18   SpO2 94%   Breastfeeding No       Lab results were obtained and reviewed. Recent Results (from the past 12 hour(s))   CBC WITH 3 PART DIFF    Collection Time: 20  2:04 PM   Result Value Ref Range    WBC 4.2 3.6 - 11.0 K/uL    RBC 3.51 (L) 3.80 - 5.20 M/uL    HGB 9.6 (L) 11.5 - 16.0 g/dL    HCT 30.7 (L) 35.0 - 47.0 %    MCV 87.5 80.0 - 99.0 FL    MCH 27.4 26.0 - 34.0 PG    MCHC 31.3 30.0 - 36.5 g/dL    RDW 14.4 11.8 - 15.8 %    PLATELET 373 818 - 688 K/uL    NEUTROPHILS 55 32 - 75 %    MIXED CELLS 16 3.2 - 16.9 %    LYMPHOCYTES 30 12 - 49 %    ABS. NEUTROPHILS 2.2 1.8 - 8.0 K/UL    ABS. MIXED CELLS 0.7 0.2 - 1.2 K/uL    ABS. LYMPHOCYTES 1.3 0.8 - 3.5 K/UL    DF AUTOMATED         Medications:  Medications Administered     epoetin malcolm-epbx (RETACRIT) injection 40,000 Units     Admin Date  2020 Action  Given Dose  [id number] Units Route  SubCUTAneous Administered By  Rohan Dallas RN                  Ms. Stephanie Benitez tolerated treatment well and was discharged from [place] [address] in stable condition. She is to return on 3/3/20 at 2:00 for her next appointment.     Latia Bradley RN  2020

## 2020-03-03 ENCOUNTER — HOSPITAL ENCOUNTER (OUTPATIENT)
Dept: INFUSION THERAPY | Age: 75
Discharge: HOME | End: 2020-03-03
Payer: MEDICARE

## 2020-03-03 VITALS
TEMPERATURE: 98.2 F | OXYGEN SATURATION: 96 % | SYSTOLIC BLOOD PRESSURE: 163 MMHG | DIASTOLIC BLOOD PRESSURE: 76 MMHG | HEART RATE: 85 BPM | RESPIRATION RATE: 18 BRPM

## 2020-03-03 LAB
ALBUMIN SERPL-MCNC: 3.6 G/DL (ref 3.5–5)
ANION GAP SERPL CALC-SCNC: 8 MMOL/L (ref 5–15)
BASO+EOS+MONOS # BLD AUTO: 0.8 K/UL (ref 0.2–1.2)
BASO+EOS+MONOS NFR BLD AUTO: 16 % (ref 3.2–16.9)
BUN SERPL-MCNC: 66 MG/DL (ref 6–20)
BUN/CREAT SERPL: 12 (ref 12–20)
CALCIUM SERPL-MCNC: 8.6 MG/DL (ref 8.5–10.1)
CHLORIDE SERPL-SCNC: 106 MMOL/L (ref 97–108)
CO2 SERPL-SCNC: 25 MMOL/L (ref 21–32)
CREAT SERPL-MCNC: 5.63 MG/DL (ref 0.55–1.02)
DIFFERENTIAL METHOD BLD: ABNORMAL
ERYTHROCYTE [DISTWIDTH] IN BLOOD BY AUTOMATED COUNT: 14.6 % (ref 11.8–15.8)
GLUCOSE SERPL-MCNC: 134 MG/DL (ref 65–100)
HCT VFR BLD AUTO: 29.3 % (ref 35–47)
HGB BLD-MCNC: 9.5 G/DL (ref 11.5–16)
IRON SATN MFR SERPL: 38 % (ref 20–50)
IRON SERPL-MCNC: 85 UG/DL (ref 35–150)
LYMPHOCYTES # BLD: 1.2 K/UL (ref 0.8–3.5)
LYMPHOCYTES NFR BLD: 22 % (ref 12–49)
MCH RBC QN AUTO: 28.4 PG (ref 26–34)
MCHC RBC AUTO-ENTMCNC: 32.4 G/DL (ref 30–36.5)
MCV RBC AUTO: 87.5 FL (ref 80–99)
NEUTS SEG # BLD: 3.3 K/UL (ref 1.8–8)
NEUTS SEG NFR BLD: 62 % (ref 32–75)
PHOSPHATE SERPL-MCNC: 4.5 MG/DL (ref 2.6–4.7)
PLATELET # BLD AUTO: 245 K/UL (ref 150–400)
POTASSIUM SERPL-SCNC: 4.2 MMOL/L (ref 3.5–5.1)
RBC # BLD AUTO: 3.35 M/UL (ref 3.8–5.2)
SODIUM SERPL-SCNC: 139 MMOL/L (ref 136–145)
TIBC SERPL-MCNC: 222 UG/DL (ref 250–450)
WBC # BLD AUTO: 5.3 K/UL (ref 3.6–11)

## 2020-03-03 PROCEDURE — 85025 COMPLETE CBC W/AUTO DIFF WBC: CPT

## 2020-03-03 PROCEDURE — 96372 THER/PROPH/DIAG INJ SC/IM: CPT

## 2020-03-03 PROCEDURE — 83540 ASSAY OF IRON: CPT

## 2020-03-03 PROCEDURE — 80069 RENAL FUNCTION PANEL: CPT

## 2020-03-03 PROCEDURE — 74011250636 HC RX REV CODE- 250/636: Performed by: INTERNAL MEDICINE

## 2020-03-03 PROCEDURE — 36415 COLL VENOUS BLD VENIPUNCTURE: CPT

## 2020-03-03 RX ADMIN — EPOETIN ALFA-EPBX 40000 UNITS: 40000 INJECTION, SOLUTION INTRAVENOUS; SUBCUTANEOUS at 14:19

## 2020-03-03 NOTE — PROGRESS NOTES
[facility] Progress Note    Date: March 3, 2020    Name: Sandip Pina    MRN: [id number]         : 1945    Ms. Donna Robert Arrived ambulatory and in no distress for Retacrit Regimen. Assessment was completed, no acute issues at this time, no new complaints voiced. Labs drawn peripherally & sent for processing. Ms. [place] vitals were reviewed. Patient Vitals for the past 12 hrs:   Temp Pulse Resp BP SpO2   20 1356 98.2 °F (36.8 °C) 85 18 163/76 96 %       Lab results were obtained and reviewed. Recent Results (from the past 12 hour(s))   CBC WITH 3 PART DIFF    Collection Time: 20  2:02 PM   Result Value Ref Range    WBC 5.3 3.6 - 11.0 K/uL    RBC 3.35 (L) 3.80 - 5.20 M/uL    HGB 9.5 (L) 11.5 - 16.0 g/dL    HCT 29.3 (L) 35.0 - 47.0 %    MCV 87.5 80.0 - 99.0 FL    MCH 28.4 26.0 - 34.0 PG    MCHC 32.4 30.0 - 36.5 g/dL    RDW 14.6 11.8 - 15.8 %    PLATELET 629 197 - 689 K/uL    NEUTROPHILS 62 32 - 75 %    MIXED CELLS 16 3.2 - 16.9 %    LYMPHOCYTES 22 12 - 49 %    ABS. NEUTROPHILS 3.3 1.8 - 8.0 K/UL    ABS. MIXED CELLS 0.8 0.2 - 1.2 K/uL    ABS. LYMPHOCYTES 1.2 0.8 - 3.5 K/UL    DF AUTOMATED           Medications:  Retacrit SQ    Ms. Nugent tolerated treatment well and was discharged from [place] [address] in stable condition. She is to return on 3/31/30 at 2:00 for her next appointment.     Mika Orellana RN  March 3, 2020

## 2020-03-31 ENCOUNTER — HOSPITAL ENCOUNTER (OUTPATIENT)
Dept: INFUSION THERAPY | Age: 75
Discharge: HOME | End: 2020-03-31
Payer: MEDICARE

## 2020-03-31 VITALS
HEART RATE: 84 BPM | SYSTOLIC BLOOD PRESSURE: 144 MMHG | TEMPERATURE: 98.9 F | RESPIRATION RATE: 18 BRPM | OXYGEN SATURATION: 98 % | DIASTOLIC BLOOD PRESSURE: 65 MMHG

## 2020-03-31 LAB
ALBUMIN SERPL-MCNC: 3.6 G/DL (ref 3.5–5)
ANION GAP SERPL CALC-SCNC: 6 MMOL/L (ref 5–15)
BASO+EOS+MONOS # BLD AUTO: 0.4 K/UL (ref 0.2–1.2)
BASO+EOS+MONOS NFR BLD AUTO: 12 % (ref 3.2–16.9)
BUN SERPL-MCNC: 57 MG/DL (ref 6–20)
BUN/CREAT SERPL: 11 (ref 12–20)
CALCIUM SERPL-MCNC: 8.9 MG/DL (ref 8.5–10.1)
CHLORIDE SERPL-SCNC: 107 MMOL/L (ref 97–108)
CO2 SERPL-SCNC: 24 MMOL/L (ref 21–32)
CREAT SERPL-MCNC: 5.35 MG/DL (ref 0.55–1.02)
DIFFERENTIAL METHOD BLD: ABNORMAL
ERYTHROCYTE [DISTWIDTH] IN BLOOD BY AUTOMATED COUNT: 14.4 % (ref 11.8–15.8)
GLUCOSE SERPL-MCNC: 135 MG/DL (ref 65–100)
HCT VFR BLD AUTO: 30.2 % (ref 35–47)
HGB BLD-MCNC: 9.7 G/DL (ref 11.5–16)
IRON SATN MFR SERPL: 34 % (ref 20–50)
IRON SERPL-MCNC: 75 UG/DL (ref 35–150)
LYMPHOCYTES # BLD: 1 K/UL (ref 0.8–3.5)
LYMPHOCYTES NFR BLD: 27 % (ref 12–49)
MCH RBC QN AUTO: 28.1 PG (ref 26–34)
MCHC RBC AUTO-ENTMCNC: 32.1 G/DL (ref 30–36.5)
MCV RBC AUTO: 87.5 FL (ref 80–99)
NEUTS SEG # BLD: 2.2 K/UL (ref 1.8–8)
NEUTS SEG NFR BLD: 61 % (ref 32–75)
PHOSPHATE SERPL-MCNC: 4.8 MG/DL (ref 2.6–4.7)
PLATELET # BLD AUTO: 247 K/UL (ref 150–400)
POTASSIUM SERPL-SCNC: 4.1 MMOL/L (ref 3.5–5.1)
RBC # BLD AUTO: 3.45 M/UL (ref 3.8–5.2)
SODIUM SERPL-SCNC: 137 MMOL/L (ref 136–145)
TIBC SERPL-MCNC: 223 UG/DL (ref 250–450)
WBC # BLD AUTO: 3.6 K/UL (ref 3.6–11)

## 2020-03-31 PROCEDURE — 85025 COMPLETE CBC W/AUTO DIFF WBC: CPT

## 2020-03-31 PROCEDURE — 36415 COLL VENOUS BLD VENIPUNCTURE: CPT

## 2020-03-31 PROCEDURE — 96372 THER/PROPH/DIAG INJ SC/IM: CPT

## 2020-03-31 PROCEDURE — 74011250636 HC RX REV CODE- 250/636: Performed by: INTERNAL MEDICINE

## 2020-03-31 PROCEDURE — 80069 RENAL FUNCTION PANEL: CPT

## 2020-03-31 PROCEDURE — 83540 ASSAY OF IRON: CPT

## 2020-03-31 RX ADMIN — EPOETIN ALFA-EPBX 40000 UNITS: 40000 INJECTION, SOLUTION INTRAVENOUS; SUBCUTANEOUS at 14:24

## 2020-03-31 NOTE — PROGRESS NOTES
[facility] Progress Note    Date: 2020    Name: Ck Koenig    MRN: [id number]         : 1945    Ms. Bladimir Hickey Arrived ambulatory and in no distress for Retacrit Regimen. Assessment was completed, no acute issues at this time, no new complaints voiced. Labs drawn peripherally & sent for processing. Ms. [place] Fair vitals were reviewed. Visit Vitals  /65   Pulse 84   Temp 98.9 °F (37.2 °C)   Resp 18   SpO2 98%       Lab results were obtained and reviewed. Recent Results (from the past 12 hour(s))   CBC WITH 3 PART DIFF    Collection Time: 20  1:58 PM   Result Value Ref Range    WBC 3.6 3.6 - 11.0 K/uL    RBC 3.45 (L) 3.80 - 5.20 M/uL    HGB 9.7 (L) 11.5 - 16.0 g/dL    HCT 30.2 (L) 35.0 - 47.0 %    MCV 87.5 80.0 - 99.0 FL    MCH 28.1 26.0 - 34.0 PG    MCHC 32.1 30.0 - 36.5 g/dL    RDW 14.4 11.8 - 15.8 %    PLATELET 415 167 - 830 K/uL    NEUTROPHILS 61 32 - 75 %    MIXED CELLS 12 3.2 - 16.9 %    LYMPHOCYTES 27 12 - 49 %    ABS. NEUTROPHILS 2.2 1.8 - 8.0 K/UL    ABS. MIXED CELLS 0.4 0.2 - 1.2 K/uL    ABS. LYMPHOCYTES 1.0 0.8 - 3.5 K/UL    DF AUTOMATED           Medications:  Retacrit SQ    Ms. Nugent tolerated treatment well and was discharged from [place] [id number] in stable condition. She is to return on 20 at 2:00 for her next appointment.     Margie Sellers RN  2020

## 2020-04-28 ENCOUNTER — HOSPITAL ENCOUNTER (OUTPATIENT)
Dept: INFUSION THERAPY | Age: 75
Discharge: HOME | End: 2020-04-28
Payer: MEDICARE

## 2020-04-28 VITALS
HEART RATE: 82 BPM | SYSTOLIC BLOOD PRESSURE: 159 MMHG | OXYGEN SATURATION: 96 % | DIASTOLIC BLOOD PRESSURE: 72 MMHG | RESPIRATION RATE: 18 BRPM | TEMPERATURE: 98.5 F

## 2020-04-28 LAB
ALBUMIN SERPL-MCNC: 3.5 G/DL (ref 3.5–5)
ANION GAP SERPL CALC-SCNC: 7 MMOL/L (ref 5–15)
BASOPHILS # BLD: 0 K/UL (ref 0–0.1)
BASOPHILS NFR BLD: 1 % (ref 0–1)
BUN SERPL-MCNC: 52 MG/DL (ref 6–20)
BUN/CREAT SERPL: 10 (ref 12–20)
CALCIUM SERPL-MCNC: 8.8 MG/DL (ref 8.5–10.1)
CALCIUM SERPL-MCNC: 9.1 MG/DL (ref 8.5–10.1)
CHLORIDE SERPL-SCNC: 109 MMOL/L (ref 97–108)
CO2 SERPL-SCNC: 23 MMOL/L (ref 21–32)
CREAT SERPL-MCNC: 5.33 MG/DL (ref 0.55–1.02)
DIFFERENTIAL METHOD BLD: ABNORMAL
EOSINOPHIL # BLD: 0.3 K/UL (ref 0–0.4)
EOSINOPHIL NFR BLD: 6 % (ref 0–7)
ERYTHROCYTE [DISTWIDTH] IN BLOOD BY AUTOMATED COUNT: 13.7 % (ref 11.5–14.5)
FERRITIN SERPL-MCNC: 355 NG/ML (ref 26–388)
GLUCOSE SERPL-MCNC: 180 MG/DL (ref 65–100)
HCT VFR BLD AUTO: 32.2 % (ref 35–47)
HGB BLD-MCNC: 9.9 G/DL (ref 11.5–16)
IMM GRANULOCYTES # BLD AUTO: 0 K/UL (ref 0–0.04)
IMM GRANULOCYTES NFR BLD AUTO: 0 % (ref 0–0.5)
IRON SATN MFR SERPL: 38 % (ref 20–50)
IRON SERPL-MCNC: 83 UG/DL (ref 35–150)
LYMPHOCYTES # BLD: 1 K/UL (ref 0.8–3.5)
LYMPHOCYTES NFR BLD: 24 % (ref 12–49)
MCH RBC QN AUTO: 27.7 PG (ref 26–34)
MCHC RBC AUTO-ENTMCNC: 30.7 G/DL (ref 30–36.5)
MCV RBC AUTO: 89.9 FL (ref 80–99)
MONOCYTES # BLD: 0.4 K/UL (ref 0–1)
MONOCYTES NFR BLD: 10 % (ref 5–13)
NEUTS SEG # BLD: 2.5 K/UL (ref 1.8–8)
NEUTS SEG NFR BLD: 59 % (ref 32–75)
NRBC # BLD: 0 K/UL (ref 0–0.01)
NRBC BLD-RTO: 0 PER 100 WBC
PHOSPHATE SERPL-MCNC: 4.5 MG/DL (ref 2.6–4.7)
PLATELET # BLD AUTO: 239 K/UL (ref 150–400)
PMV BLD AUTO: 9 FL (ref 8.9–12.9)
POTASSIUM SERPL-SCNC: 4.2 MMOL/L (ref 3.5–5.1)
PTH-INTACT SERPL-MCNC: 252.4 PG/ML (ref 18.4–88)
RBC # BLD AUTO: 3.58 M/UL (ref 3.8–5.2)
SODIUM SERPL-SCNC: 139 MMOL/L (ref 136–145)
TIBC SERPL-MCNC: 218 UG/DL (ref 250–450)
WBC # BLD AUTO: 4.3 K/UL (ref 3.6–11)

## 2020-04-28 PROCEDURE — 83540 ASSAY OF IRON: CPT

## 2020-04-28 PROCEDURE — 74011250636 HC RX REV CODE- 250/636: Performed by: INTERNAL MEDICINE

## 2020-04-28 PROCEDURE — 96372 THER/PROPH/DIAG INJ SC/IM: CPT

## 2020-04-28 PROCEDURE — 80069 RENAL FUNCTION PANEL: CPT

## 2020-04-28 PROCEDURE — 36415 COLL VENOUS BLD VENIPUNCTURE: CPT

## 2020-04-28 PROCEDURE — 83970 ASSAY OF PARATHORMONE: CPT

## 2020-04-28 PROCEDURE — 82728 ASSAY OF FERRITIN: CPT

## 2020-04-28 PROCEDURE — 85025 COMPLETE CBC W/AUTO DIFF WBC: CPT

## 2020-04-28 RX ADMIN — EPOETIN ALFA-EPBX 40000 UNITS: 40000 INJECTION, SOLUTION INTRAVENOUS; SUBCUTANEOUS at 14:31

## 2020-04-28 NOTE — PROGRESS NOTES
[facility] Progress Note    Date: 2020    Name: Apolinar Bernal    MRN: [id number]         : 1945    1350. Ms. Yue Grimes Arrived ambulatory and in no distress for Retacrit Injection. Assessment was completed, no acute issues at this time, no new complaints voiced. Patient reports feeling well today. Patient Vitals for the past 12 hrs:   Temp Pulse Resp BP SpO2   20 1351 98.5 °F (36.9 °C) 82 18 159/72 96 %     Recent Results (from the past 12 hour(s))   RENAL FUNCTION PANEL    Collection Time: 20  1:53 PM   Result Value Ref Range    Sodium 139 136 - 145 mmol/L    Potassium 4.2 3.5 - 5.1 mmol/L    Chloride 109 (H) 97 - 108 mmol/L    CO2 23 21 - 32 mmol/L    Anion gap 7 5 - 15 mmol/L    Glucose 180 (H) 65 - 100 mg/dL    BUN 52 (H) 6 - 20 MG/DL    Creatinine 5.33 (H) 0.55 - 1.02 MG/DL    BUN/Creatinine ratio 10 (L) 12 - 20      GFR est AA 9 (L) >60 ml/min/1.73m2    GFR est non-AA 8 (L) >60 ml/min/1.73m2    Calcium 8.8 8.5 - 10.1 MG/DL    Phosphorus 4.5 2.6 - 4.7 MG/DL    Albumin 3.5 3.5 - 5.0 g/dL   CBC WITH AUTOMATED DIFF    Collection Time: 20  1:53 PM   Result Value Ref Range    WBC 4.3 3.6 - 11.0 K/uL    RBC 3.58 (L) 3.80 - 5.20 M/uL    HGB 9.9 (L) 11.5 - 16.0 g/dL    HCT 32.2 (L) 35.0 - 47.0 %    MCV 89.9 80.0 - 99.0 FL    MCH 27.7 26.0 - 34.0 PG    MCHC 30.7 30.0 - 36.5 g/dL    RDW 13.7 11.5 - 14.5 %    PLATELET 174 548 - 818 K/uL    MPV 9.0 8.9 - 12.9 FL    NRBC 0.0 0  WBC    ABSOLUTE NRBC 0.00 0.00 - 0.01 K/uL    NEUTROPHILS 59 32 - 75 %    LYMPHOCYTES 24 12 - 49 %    MONOCYTES 10 5 - 13 %    EOSINOPHILS 6 0 - 7 %    BASOPHILS 1 0 - 1 %    IMMATURE GRANULOCYTES 0 0.0 - 0.5 %    ABS. NEUTROPHILS 2.5 1.8 - 8.0 K/UL    ABS. LYMPHOCYTES 1.0 0.8 - 3.5 K/UL    ABS. MONOCYTES 0.4 0.0 - 1.0 K/UL    ABS. EOSINOPHILS 0.3 0.0 - 0.4 K/UL    ABS. BASOPHILS 0.0 0.0 - 0.1 K/UL    ABS. IMM.  GRANS. 0.0 0.00 - 0.04 K/UL    DF AUTOMATED           Medications:  Retacrit subcutaneous to L arm    1435. Ms. Blake Reyes tolerated treatment well and was discharged from [place] [address] in stable condition. She is to return on 05/26/20 for her next appointment.     Maikel Fisher RN  April 28, 2020

## 2020-05-26 ENCOUNTER — APPOINTMENT (OUTPATIENT)
Dept: INFUSION THERAPY | Age: 75
End: 2020-05-26

## 2020-05-29 ENCOUNTER — HOSPITAL ENCOUNTER (OUTPATIENT)
Dept: INFUSION THERAPY | Age: 75
Discharge: HOME | End: 2020-05-29
Payer: MEDICARE

## 2020-05-29 VITALS
DIASTOLIC BLOOD PRESSURE: 70 MMHG | RESPIRATION RATE: 18 BRPM | TEMPERATURE: 98.7 F | SYSTOLIC BLOOD PRESSURE: 143 MMHG | HEART RATE: 74 BPM | OXYGEN SATURATION: 92 %

## 2020-05-29 LAB
ALBUMIN SERPL-MCNC: 3.6 G/DL (ref 3.5–5)
ANION GAP SERPL CALC-SCNC: 8 MMOL/L (ref 5–15)
BASO+EOS+MONOS # BLD AUTO: 0.5 K/UL (ref 0.2–1.2)
BASO+EOS+MONOS NFR BLD AUTO: 14 % (ref 3.2–16.9)
BUN SERPL-MCNC: 64 MG/DL (ref 6–20)
BUN/CREAT SERPL: 12 (ref 12–20)
CALCIUM SERPL-MCNC: 8.5 MG/DL (ref 8.5–10.1)
CHLORIDE SERPL-SCNC: 108 MMOL/L (ref 97–108)
CO2 SERPL-SCNC: 23 MMOL/L (ref 21–32)
CREAT SERPL-MCNC: 5.56 MG/DL (ref 0.55–1.02)
DIFFERENTIAL METHOD BLD: ABNORMAL
ERYTHROCYTE [DISTWIDTH] IN BLOOD BY AUTOMATED COUNT: 14.4 % (ref 11.8–15.8)
GLUCOSE SERPL-MCNC: 134 MG/DL (ref 65–100)
HCT VFR BLD AUTO: 28.3 % (ref 35–47)
HGB BLD-MCNC: 9.5 G/DL (ref 11.5–16)
IRON SATN MFR SERPL: 38 % (ref 20–50)
IRON SERPL-MCNC: 80 UG/DL (ref 35–150)
LYMPHOCYTES # BLD: 1.2 K/UL (ref 0.8–3.5)
LYMPHOCYTES NFR BLD: 33 % (ref 12–49)
MCH RBC QN AUTO: 28.9 PG (ref 26–34)
MCHC RBC AUTO-ENTMCNC: 33.6 G/DL (ref 30–36.5)
MCV RBC AUTO: 86 FL (ref 80–99)
NEUTS SEG # BLD: 2 K/UL (ref 1.8–8)
NEUTS SEG NFR BLD: 53 % (ref 32–75)
PHOSPHATE SERPL-MCNC: 5.6 MG/DL (ref 2.6–4.7)
PLATELET # BLD AUTO: 241 K/UL (ref 150–400)
POTASSIUM SERPL-SCNC: 4.3 MMOL/L (ref 3.5–5.1)
RBC # BLD AUTO: 3.29 M/UL (ref 3.8–5.2)
SODIUM SERPL-SCNC: 139 MMOL/L (ref 136–145)
TIBC SERPL-MCNC: 212 UG/DL (ref 250–450)
WBC # BLD AUTO: 3.7 K/UL (ref 3.6–11)

## 2020-05-29 PROCEDURE — 96372 THER/PROPH/DIAG INJ SC/IM: CPT

## 2020-05-29 PROCEDURE — 74011250636 HC RX REV CODE- 250/636: Performed by: INTERNAL MEDICINE

## 2020-05-29 PROCEDURE — 80069 RENAL FUNCTION PANEL: CPT

## 2020-05-29 PROCEDURE — 36415 COLL VENOUS BLD VENIPUNCTURE: CPT

## 2020-05-29 PROCEDURE — 85025 COMPLETE CBC W/AUTO DIFF WBC: CPT

## 2020-05-29 PROCEDURE — 83540 ASSAY OF IRON: CPT

## 2020-05-29 RX ADMIN — EPOETIN ALFA-EPBX 40000 UNITS: 40000 INJECTION, SOLUTION INTRAVENOUS; SUBCUTANEOUS at 14:37

## 2020-05-29 NOTE — PROGRESS NOTES
Outpatient Infusion Center Short Visit Progress Note    4670 Patient admitted to [facility] for Retacrit ambulatory via walker in stable condition. Assessment completed. No new concerns voiced. Labs drawn from right [facility] and sent for processing. Results are within parameters to treat. Vital Signs:  Visit Vitals  /70 (BP 1 Location: Left arm, BP Patient Position: Sitting)   Pulse 74   Temp 98.7 °F (37.1 °C)   Resp 18   SpO2 92%   Breastfeeding No     Recent Results (from the past 12 hour(s))   CBC WITH 3 PART DIFF    Collection Time: 05/29/20  2:05 PM   Result Value Ref Range    WBC 3.7 3.6 - 11.0 K/uL    RBC 3.29 (L) 3.80 - 5.20 M/uL    HGB 9.5 (L) 11.5 - 16.0 g/dL    HCT 28.3 (L) 35.0 - 47.0 %    MCV 86.0 80.0 - 99.0 FL    MCH 28.9 26.0 - 34.0 PG    MCHC 33.6 30.0 - 36.5 g/dL    RDW 14.4 11.8 - 15.8 %    PLATELET 851 157 - 725 K/uL    NEUTROPHILS 53 32 - 75 %    MIXED CELLS 14 3.2 - 16.9 %    LYMPHOCYTES 33 12 - 49 %    ABS. NEUTROPHILS 2.0 1.8 - 8.0 K/UL    ABS. MIXED CELLS 0.5 0.2 - 1.2 K/uL    ABS. LYMPHOCYTES 1.2 0.8 - 3.5 K/UL    DF AUTOMATED       Medications:  Retacrit SQ in right upper arm    1440 Patient tolerated treatment well. Patient discharged from [place] [address] ambulatory in no distress at 1440. Patient aware of next appointment.     Future Appointments   Date Time Provider Tootie Hudson   6/26/2020  2:00 PM [facility]   7/24/2020  2:00 PM SS [facility]   8/21/2020  2:00 PM SS [facility]6 [facility]   9/18/2020  2:00 PM SS [facility]6 [facility]   10/16/2020  2:00 PM SS INF5 [facility]4 [facility] [address] [place] [address]

## 2020-06-23 ENCOUNTER — APPOINTMENT (OUTPATIENT)
Dept: INFUSION THERAPY | Age: 75
End: 2020-06-23

## 2020-06-26 ENCOUNTER — HOSPITAL ENCOUNTER (OUTPATIENT)
Dept: INFUSION THERAPY | Age: 75
Discharge: HOME | End: 2020-06-26
Payer: MEDICARE

## 2020-06-26 VITALS
TEMPERATURE: 98.5 F | SYSTOLIC BLOOD PRESSURE: 143 MMHG | DIASTOLIC BLOOD PRESSURE: 73 MMHG | HEART RATE: 81 BPM | RESPIRATION RATE: 20 BRPM | OXYGEN SATURATION: 98 %

## 2020-06-26 LAB
ALBUMIN SERPL-MCNC: 3.4 G/DL (ref 3.5–5)
ANION GAP SERPL CALC-SCNC: 8 MMOL/L (ref 5–15)
BASO+EOS+MONOS # BLD AUTO: 0.8 K/UL (ref 0.2–1.2)
BASO+EOS+MONOS NFR BLD AUTO: 19 % (ref 3.2–16.9)
BUN SERPL-MCNC: 58 MG/DL (ref 6–20)
BUN/CREAT SERPL: 10 (ref 12–20)
CALCIUM SERPL-MCNC: 8.2 MG/DL (ref 8.5–10.1)
CHLORIDE SERPL-SCNC: 108 MMOL/L (ref 97–108)
CO2 SERPL-SCNC: 24 MMOL/L (ref 21–32)
CREAT SERPL-MCNC: 5.96 MG/DL (ref 0.55–1.02)
DIFFERENTIAL METHOD BLD: ABNORMAL
ERYTHROCYTE [DISTWIDTH] IN BLOOD BY AUTOMATED COUNT: 14.7 % (ref 11.8–15.8)
GLUCOSE SERPL-MCNC: 142 MG/DL (ref 65–100)
HCT VFR BLD AUTO: 27.6 % (ref 35–47)
HGB BLD-MCNC: 9.1 G/DL (ref 11.5–16)
IRON SATN MFR SERPL: 46 % (ref 20–50)
IRON SERPL-MCNC: 98 UG/DL (ref 35–150)
LYMPHOCYTES # BLD: 1.2 K/UL (ref 0.8–3.5)
LYMPHOCYTES NFR BLD: 29 % (ref 12–49)
MCH RBC QN AUTO: 29.2 PG (ref 26–34)
MCHC RBC AUTO-ENTMCNC: 33 G/DL (ref 30–36.5)
MCV RBC AUTO: 88.5 FL (ref 80–99)
NEUTS SEG # BLD: 2.1 K/UL (ref 1.8–8)
NEUTS SEG NFR BLD: 52 % (ref 32–75)
PHOSPHATE SERPL-MCNC: 6.8 MG/DL (ref 2.6–4.7)
PLATELET # BLD AUTO: 259 K/UL (ref 150–400)
POTASSIUM SERPL-SCNC: 4.2 MMOL/L (ref 3.5–5.1)
RBC # BLD AUTO: 3.12 M/UL (ref 3.8–5.2)
SODIUM SERPL-SCNC: 140 MMOL/L (ref 136–145)
TIBC SERPL-MCNC: 213 UG/DL (ref 250–450)
WBC # BLD AUTO: 4.1 K/UL (ref 3.6–11)

## 2020-06-26 PROCEDURE — 80069 RENAL FUNCTION PANEL: CPT

## 2020-06-26 PROCEDURE — 36415 COLL VENOUS BLD VENIPUNCTURE: CPT

## 2020-06-26 PROCEDURE — 85025 COMPLETE CBC W/AUTO DIFF WBC: CPT

## 2020-06-26 PROCEDURE — 83540 ASSAY OF IRON: CPT

## 2020-06-26 PROCEDURE — 96372 THER/PROPH/DIAG INJ SC/IM: CPT

## 2020-06-26 PROCEDURE — 74011250636 HC RX REV CODE- 250/636: Performed by: INTERNAL MEDICINE

## 2020-06-26 RX ADMIN — EPOETIN ALFA-EPBX 40000 UNITS: 40000 INJECTION, SOLUTION INTRAVENOUS; SUBCUTANEOUS at 14:29

## 2020-06-26 NOTE — PROGRESS NOTES
[facility] VISIT NOTE    7396. Pt arrived at [facility] ambulatory and in no distress for Retacrit. Assessment completed, pt c/o knee pain in BLE. Labs drawn peripherally by ADELINE Myrick and sent for processing. Labs resulted and are within parameters to treat. Medications received:  Retacrit - SQ - Left Arm    Tolerated treatment well, no adverse reaction noted. Patient Vitals for the past 12 hrs:   Temp Pulse Resp BP SpO2   06/26/20 1355 98.5 °F (36.9 °C) 81 20 143/73 98 %     Recent Results (from the past 12 hour(s))   CBC WITH 3 PART DIFF    Collection Time: 06/26/20  2:01 PM   Result Value Ref Range    WBC 4.1 3.6 - 11.0 K/uL    RBC 3.12 (L) 3.80 - 5.20 M/uL    HGB 9.1 (L) 11.5 - 16.0 g/dL    HCT 27.6 (L) 35.0 - 47.0 %    MCV 88.5 80.0 - 99.0 FL    MCH 29.2 26.0 - 34.0 PG    MCHC 33.0 30.0 - 36.5 g/dL    RDW 14.7 11.8 - 15.8 %    PLATELET 807 629 - 234 K/uL    NEUTROPHILS 52 32 - 75 %    MIXED CELLS 19 (H) 3.2 - 16.9 %    LYMPHOCYTES 29 12 - 49 %    ABS. NEUTROPHILS 2.1 1.8 - 8.0 K/UL    ABS. MIXED CELLS 0.8 0.2 - 1.2 K/uL    ABS. LYMPHOCYTES 1.2 0.8 - 3.5 K/UL    DF AUTOMATED     RENAL FUNCTION PANEL    Collection Time: 06/26/20  2:01 PM   Result Value Ref Range    Sodium 140 136 - 145 mmol/L    Potassium 4.2 3.5 - 5.1 mmol/L    Chloride 108 97 - 108 mmol/L    CO2 24 21 - 32 mmol/L    Anion gap 8 5 - 15 mmol/L    Glucose 142 (H) 65 - 100 mg/dL    BUN 58 (H) 6 - 20 MG/DL    Creatinine 5.96 (H) 0.55 - 1.02 MG/DL    BUN/Creatinine ratio 10 (L) 12 - 20      GFR est AA 8 (L) >60 ml/min/1.73m2    GFR est non-AA 7 (L) >60 ml/min/1.73m2    Calcium 8.2 (L) 8.5 - 10.1 MG/DL    Phosphorus 6.8 (H) 2.6 - 4.7 MG/DL    Albumin 3.4 (L) 3.5 - 5.0 g/dL     1430. D/C'd from [facility] ambulatory and in no distress.  Next appointment is 7/24/20 at 2:00 pm.

## 2020-07-21 ENCOUNTER — APPOINTMENT (OUTPATIENT)
Dept: INFUSION THERAPY | Age: 75
End: 2020-07-21

## 2020-07-24 ENCOUNTER — HOSPITAL ENCOUNTER (OUTPATIENT)
Dept: INFUSION THERAPY | Age: 75
Discharge: HOME | End: 2020-07-24
Payer: MEDICARE

## 2020-07-24 VITALS
SYSTOLIC BLOOD PRESSURE: 146 MMHG | HEART RATE: 80 BPM | DIASTOLIC BLOOD PRESSURE: 68 MMHG | TEMPERATURE: 99.3 F | WEIGHT: 232.2 LBS | RESPIRATION RATE: 18 BRPM

## 2020-07-24 LAB
ALBUMIN SERPL-MCNC: 3.5 G/DL (ref 3.5–5)
ANION GAP SERPL CALC-SCNC: 9 MMOL/L (ref 5–15)
BASO+EOS+MONOS # BLD AUTO: 0.5 K/UL (ref 0.2–1.2)
BASO+EOS+MONOS NFR BLD AUTO: 11 % (ref 3.2–16.9)
BUN SERPL-MCNC: 62 MG/DL (ref 6–20)
BUN/CREAT SERPL: 11 (ref 12–20)
CALCIUM SERPL-MCNC: 7.9 MG/DL (ref 8.5–10.1)
CALCIUM SERPL-MCNC: 8.3 MG/DL (ref 8.5–10.1)
CHLORIDE SERPL-SCNC: 107 MMOL/L (ref 97–108)
CO2 SERPL-SCNC: 23 MMOL/L (ref 21–32)
CREAT SERPL-MCNC: 5.59 MG/DL (ref 0.55–1.02)
DIFFERENTIAL METHOD BLD: ABNORMAL
ERYTHROCYTE [DISTWIDTH] IN BLOOD BY AUTOMATED COUNT: 14.7 % (ref 11.8–15.8)
FERRITIN SERPL-MCNC: 357 NG/ML (ref 26–388)
GLUCOSE SERPL-MCNC: 195 MG/DL (ref 65–100)
HCT VFR BLD AUTO: 27.9 % (ref 35–47)
HGB BLD-MCNC: 9.5 G/DL (ref 11.5–16)
IRON SATN MFR SERPL: 44 % (ref 20–50)
IRON SERPL-MCNC: 98 UG/DL (ref 35–150)
LYMPHOCYTES # BLD: 1 K/UL (ref 0.8–3.5)
LYMPHOCYTES NFR BLD: 25 % (ref 12–49)
MCH RBC QN AUTO: 29.4 PG (ref 26–34)
MCHC RBC AUTO-ENTMCNC: 34.1 G/DL (ref 30–36.5)
MCV RBC AUTO: 86.4 FL (ref 80–99)
NEUTS SEG # BLD: 2.7 K/UL (ref 1.8–8)
NEUTS SEG NFR BLD: 64 % (ref 32–75)
PHOSPHATE SERPL-MCNC: 5.4 MG/DL (ref 2.6–4.7)
PLATELET # BLD AUTO: 241 K/UL (ref 150–400)
POTASSIUM SERPL-SCNC: 3.7 MMOL/L (ref 3.5–5.1)
PTH-INTACT SERPL-MCNC: 463.4 PG/ML (ref 18.4–88)
RBC # BLD AUTO: 3.23 M/UL (ref 3.8–5.2)
SODIUM SERPL-SCNC: 139 MMOL/L (ref 136–145)
TIBC SERPL-MCNC: 224 UG/DL (ref 250–450)
WBC # BLD AUTO: 4.2 K/UL (ref 3.6–11)

## 2020-07-24 PROCEDURE — 96372 THER/PROPH/DIAG INJ SC/IM: CPT

## 2020-07-24 PROCEDURE — 83540 ASSAY OF IRON: CPT

## 2020-07-24 PROCEDURE — 82728 ASSAY OF FERRITIN: CPT

## 2020-07-24 PROCEDURE — 74011250636 HC RX REV CODE- 250/636: Performed by: INTERNAL MEDICINE

## 2020-07-24 PROCEDURE — 80069 RENAL FUNCTION PANEL: CPT

## 2020-07-24 PROCEDURE — 36415 COLL VENOUS BLD VENIPUNCTURE: CPT

## 2020-07-24 PROCEDURE — 83970 ASSAY OF PARATHORMONE: CPT

## 2020-07-24 PROCEDURE — 85025 COMPLETE CBC W/AUTO DIFF WBC: CPT

## 2020-07-24 RX ADMIN — EPOETIN ALFA-EPBX 40000 UNITS: 40000 INJECTION, SOLUTION INTRAVENOUS; SUBCUTANEOUS at 14:46

## 2020-07-24 NOTE — PROGRESS NOTES
Outpatient Infusion Center Short Visit Progress Note    1410 Patient admitted to [facility] for R Azar 54 ambulatory in stable condition. Assessment completed. No new concerns voiced. Labs obtained from right [facility] and sent for processing. Results are within parameters to treat. Vital Signs:  Visit Vitals  /68 (BP 1 Location: Left arm, BP Patient Position: Sitting)   Pulse 80   Temp 99.3 °F (37.4 °C)   Resp 18   Wt 105.3 kg (232 lb 3.2 oz)   Breastfeeding No     Recent Results (from the past 12 hour(s))   CBC WITH 3 PART DIFF    Collection Time: 07/24/20  2:14 PM   Result Value Ref Range    WBC 4.2 3.6 - 11.0 K/uL    RBC 3.23 (L) 3.80 - 5.20 M/uL    HGB 9.5 (L) 11.5 - 16.0 g/dL    HCT 27.9 (L) 35.0 - 47.0 %    MCV 86.4 80.0 - 99.0 FL    MCH 29.4 26.0 - 34.0 PG    MCHC 34.1 30.0 - 36.5 g/dL    RDW 14.7 11.8 - 15.8 %    PLATELET 087 462 - 382 K/uL    NEUTROPHILS 64 32 - 75 %    MIXED CELLS 11 3.2 - 16.9 %    LYMPHOCYTES 25 12 - 49 %    ABS. NEUTROPHILS 2.7 1.8 - 8.0 K/UL    ABS. MIXED CELLS 0.5 0.2 - 1.2 K/uL    ABS. LYMPHOCYTES 1.0 0.8 - 3.5 K/UL    DF AUTOMATED       Medications:  Medications Administered     epoetin malcolm-epbx (RETACRIT) injection 40,000 Units     Admin Date  07/24/2020 Action  Given Dose  72702 Units Route  SubCUTAneous Administered By  Tayo Luther, RN              0589 Patient tolerated treatment well. Patient discharged from [facility] 58 ambulatory in no distress at 1450. Patient aware of next appointment.     Future Appointments   Date Time Provider Tootie Hudson   8/21/2020  2:00 PM [facility]   9/18/2020  2:00 PM SS [facility]   10/16/2020  2:00 PM SS IN[facility]5 [facility] [address] [place] [address]

## 2020-08-18 ENCOUNTER — APPOINTMENT (OUTPATIENT)
Dept: INFUSION THERAPY | Age: 75
End: 2020-08-18

## 2020-08-21 ENCOUNTER — HOSPITAL ENCOUNTER (OUTPATIENT)
Dept: INFUSION THERAPY | Age: 75
Discharge: HOME | End: 2020-08-21
Payer: MEDICARE

## 2020-08-21 VITALS
RESPIRATION RATE: 18 BRPM | OXYGEN SATURATION: 96 % | DIASTOLIC BLOOD PRESSURE: 78 MMHG | TEMPERATURE: 98.4 F | SYSTOLIC BLOOD PRESSURE: 153 MMHG | HEART RATE: 79 BPM

## 2020-08-21 LAB
ALBUMIN SERPL-MCNC: 3.5 G/DL (ref 3.5–5)
ANION GAP SERPL CALC-SCNC: 10 MMOL/L (ref 5–15)
BASO+EOS+MONOS # BLD AUTO: 0.5 K/UL (ref 0.2–1.2)
BASO+EOS+MONOS NFR BLD AUTO: 15 % (ref 3.2–16.9)
BUN SERPL-MCNC: 56 MG/DL (ref 6–20)
BUN/CREAT SERPL: 9 (ref 12–20)
CALCIUM SERPL-MCNC: 8.6 MG/DL (ref 8.5–10.1)
CHLORIDE SERPL-SCNC: 109 MMOL/L (ref 97–108)
CO2 SERPL-SCNC: 22 MMOL/L (ref 21–32)
CREAT SERPL-MCNC: 5.99 MG/DL (ref 0.55–1.02)
DIFFERENTIAL METHOD BLD: ABNORMAL
ERYTHROCYTE [DISTWIDTH] IN BLOOD BY AUTOMATED COUNT: 14.3 % (ref 11.8–15.8)
GLUCOSE SERPL-MCNC: 171 MG/DL (ref 65–100)
HCT VFR BLD AUTO: 28.8 % (ref 35–47)
HGB BLD-MCNC: 9.7 G/DL (ref 11.5–16)
IRON SATN MFR SERPL: 54 % (ref 20–50)
IRON SERPL-MCNC: 117 UG/DL (ref 35–150)
LYMPHOCYTES # BLD: 0.9 K/UL (ref 0.8–3.5)
LYMPHOCYTES NFR BLD: 29 % (ref 12–49)
MCH RBC QN AUTO: 28.9 PG (ref 26–34)
MCHC RBC AUTO-ENTMCNC: 33.7 G/DL (ref 30–36.5)
MCV RBC AUTO: 85.7 FL (ref 80–99)
NEUTS SEG # BLD: 1.8 K/UL (ref 1.8–8)
NEUTS SEG NFR BLD: 56 % (ref 32–75)
PHOSPHATE SERPL-MCNC: 4.9 MG/DL (ref 2.6–4.7)
PLATELET # BLD AUTO: 234 K/UL (ref 150–400)
POTASSIUM SERPL-SCNC: 3.5 MMOL/L (ref 3.5–5.1)
RBC # BLD AUTO: 3.36 M/UL (ref 3.8–5.2)
SODIUM SERPL-SCNC: 141 MMOL/L (ref 136–145)
TIBC SERPL-MCNC: 216 UG/DL (ref 250–450)
WBC # BLD AUTO: 3.2 K/UL (ref 3.6–11)

## 2020-08-21 PROCEDURE — 36415 COLL VENOUS BLD VENIPUNCTURE: CPT

## 2020-08-21 PROCEDURE — 74011250636 HC RX REV CODE- 250/636: Performed by: INTERNAL MEDICINE

## 2020-08-21 PROCEDURE — 83540 ASSAY OF IRON: CPT

## 2020-08-21 PROCEDURE — 96372 THER/PROPH/DIAG INJ SC/IM: CPT

## 2020-08-21 PROCEDURE — 85025 COMPLETE CBC W/AUTO DIFF WBC: CPT

## 2020-08-21 PROCEDURE — 80069 RENAL FUNCTION PANEL: CPT

## 2020-08-21 RX ADMIN — EPOETIN ALFA-EPBX 40000 UNITS: 40000 INJECTION, SOLUTION INTRAVENOUS; SUBCUTANEOUS at 14:15

## 2020-08-21 NOTE — PROGRESS NOTES
[facility] VISIT NOTE    1350. Pt arrived at [facility] ambulatory and in no distress for Retacrit. Assessment completed, pt with no acute complaints or concerns. Labs drawn peripherally by ADELINE Napier and sent for processing. Labs resulted and are within parameters to treat. Medications received:  Retacrit - SQ - Left Arm    Tolerated treatment well, no adverse reaction noted. Patient Vitals for the past 12 hrs:   Temp Pulse Resp BP SpO2   08/21/20 1352 98.4 °F (36.9 °C) 79 18 153/78 96 %     Recent Results (from the past 12 hour(s))   CBC WITH 3 PART DIFF    Collection Time: 08/21/20  1:57 PM   Result Value Ref Range    WBC 3.2 (L) 3.6 - 11.0 K/uL    RBC 3.36 (L) 3.80 - 5.20 M/uL    HGB 9.7 (L) 11.5 - 16.0 g/dL    HCT 28.8 (L) 35.0 - 47.0 %    MCV 85.7 80.0 - 99.0 FL    MCH 28.9 26.0 - 34.0 PG    MCHC 33.7 30.0 - 36.5 g/dL    RDW 14.3 11.8 - 15.8 %    PLATELET 813 301 - 000 K/uL    NEUTROPHILS 56 32 - 75 %    MIXED CELLS 15 3.2 - 16.9 %    LYMPHOCYTES 29 12 - 49 %    ABS. NEUTROPHILS 1.8 1.8 - 8.0 K/UL    ABS. MIXED CELLS 0.5 0.2 - 1.2 K/uL    ABS. LYMPHOCYTES 0.9 0.8 - 3.5 K/UL    DF AUTOMATED       1415. D/C'd from [facility] ambulatory and in no distress.  Next appointment is 9/18/20 at 2:00 pm.

## 2020-09-18 ENCOUNTER — HOSPITAL ENCOUNTER (OUTPATIENT)
Dept: INFUSION THERAPY | Age: 75
Discharge: HOME | End: 2020-09-18
Payer: MEDICARE

## 2020-09-18 VITALS
HEART RATE: 82 BPM | DIASTOLIC BLOOD PRESSURE: 72 MMHG | SYSTOLIC BLOOD PRESSURE: 136 MMHG | OXYGEN SATURATION: 100 % | TEMPERATURE: 98.4 F | RESPIRATION RATE: 18 BRPM

## 2020-09-18 LAB
ALBUMIN SERPL-MCNC: 3.4 G/DL (ref 3.5–5)
ANION GAP SERPL CALC-SCNC: 10 MMOL/L (ref 5–15)
BASO+EOS+MONOS # BLD AUTO: 0.4 K/UL (ref 0.2–1.2)
BASO+EOS+MONOS NFR BLD AUTO: 9 % (ref 3.2–16.9)
BUN SERPL-MCNC: 70 MG/DL (ref 6–20)
BUN/CREAT SERPL: 10 (ref 12–20)
CALCIUM SERPL-MCNC: 8.3 MG/DL (ref 8.5–10.1)
CHLORIDE SERPL-SCNC: 105 MMOL/L (ref 97–108)
CO2 SERPL-SCNC: 21 MMOL/L (ref 21–32)
CREAT SERPL-MCNC: 6.71 MG/DL (ref 0.55–1.02)
DIFFERENTIAL METHOD BLD: ABNORMAL
ERYTHROCYTE [DISTWIDTH] IN BLOOD BY AUTOMATED COUNT: 14.4 % (ref 11.8–15.8)
GLUCOSE SERPL-MCNC: 149 MG/DL (ref 65–100)
HCT VFR BLD AUTO: 28.1 % (ref 35–47)
HGB BLD-MCNC: 9.3 G/DL (ref 11.5–16)
IRON SATN MFR SERPL: 92 % (ref 20–50)
IRON SERPL-MCNC: 190 UG/DL (ref 35–150)
LYMPHOCYTES # BLD: 1.3 K/UL (ref 0.8–3.5)
LYMPHOCYTES NFR BLD: 26 % (ref 12–49)
MCH RBC QN AUTO: 28.4 PG (ref 26–34)
MCHC RBC AUTO-ENTMCNC: 33.1 G/DL (ref 30–36.5)
MCV RBC AUTO: 85.9 FL (ref 80–99)
NEUTS SEG # BLD: 3.3 K/UL (ref 1.8–8)
NEUTS SEG NFR BLD: 66 % (ref 32–75)
PHOSPHATE SERPL-MCNC: 5.5 MG/DL (ref 2.6–4.7)
PLATELET # BLD AUTO: 301 K/UL (ref 150–400)
POTASSIUM SERPL-SCNC: 4.3 MMOL/L (ref 3.5–5.1)
RBC # BLD AUTO: 3.27 M/UL (ref 3.8–5.2)
SODIUM SERPL-SCNC: 136 MMOL/L (ref 136–145)
TIBC SERPL-MCNC: 207 UG/DL (ref 250–450)
WBC # BLD AUTO: 5 K/UL (ref 3.6–11)

## 2020-09-18 PROCEDURE — 36415 COLL VENOUS BLD VENIPUNCTURE: CPT

## 2020-09-18 PROCEDURE — 83540 ASSAY OF IRON: CPT

## 2020-09-18 PROCEDURE — 85025 COMPLETE CBC W/AUTO DIFF WBC: CPT

## 2020-09-18 PROCEDURE — 96372 THER/PROPH/DIAG INJ SC/IM: CPT

## 2020-09-18 PROCEDURE — 74011250636 HC RX REV CODE- 250/636: Performed by: INTERNAL MEDICINE

## 2020-09-18 PROCEDURE — 80069 RENAL FUNCTION PANEL: CPT

## 2020-09-18 RX ADMIN — EPOETIN ALFA-EPBX 40000 UNITS: 40000 INJECTION, SOLUTION INTRAVENOUS; SUBCUTANEOUS at 14:48

## 2020-09-18 NOTE — PROGRESS NOTES
[facility] Progress Note    Date: 2020    Name: Raiza Vidal    MRN: [id number]         : 1945    Ms. Nugent Arrived ambulatory and in no distress for Retacrit Injection. Assessment was completed, patient complaining of upset stomach with diarrhea this AM. Labs drawn peripherally by [facility]. Ms. Richardson Del vitals were reviewed. Visit Vitals  /72   Pulse 82   Temp 98.4 °F (36.9 °C)   Resp 18   SpO2 100%   Breastfeeding No     Recent Results (from the past 8 hour(s))   CBC WITH 3 PART DIFF    Collection Time: 20  2:22 PM   Result Value Ref Range    WBC 5.0 3.6 - 11.0 K/uL    RBC 3.27 (L) 3.80 - 5.20 M/uL    HGB 9.3 (L) 11.5 - 16.0 g/dL    HCT 28.1 (L) 35.0 - 47.0 %    MCV 85.9 80.0 - 99.0 FL    MCH 28.4 26.0 - 34.0 PG    MCHC 33.1 30.0 - 36.5 g/dL    RDW 14.4 11.8 - 15.8 %    PLATELET 213 044 - 639 K/uL    NEUTROPHILS 66 32 - 75 %    MIXED CELLS 9 3.2 - 16.9 %    LYMPHOCYTES 26 12 - 49 %    ABS. NEUTROPHILS 3.3 1.8 - 8.0 K/UL    ABS. MIXED CELLS 0.4 0.2 - 1.2 K/uL    ABS. LYMPHOCYTES 1.3 0.8 - 3.5 K/UL    DF AUTOMATED       Labs within parameters to treat. Medications:  SQ Retacrit    Ms. Nugent tolerated treatment well and was discharged from [place] [address] in stable condition. She is to return on 2020 for her next appointment.     Florence Thayer RN  2020

## 2020-10-16 ENCOUNTER — HOSPITAL ENCOUNTER (OUTPATIENT)
Dept: INFUSION THERAPY | Age: 75
Discharge: HOME | End: 2020-10-16

## 2020-10-22 ENCOUNTER — HOSPITAL ENCOUNTER (OUTPATIENT)
Dept: INFUSION THERAPY | Age: 75
Discharge: HOME | End: 2020-10-22
Payer: MEDICARE

## 2020-10-22 VITALS
HEART RATE: 75 BPM | RESPIRATION RATE: 18 BRPM | DIASTOLIC BLOOD PRESSURE: 61 MMHG | WEIGHT: 226.5 LBS | SYSTOLIC BLOOD PRESSURE: 130 MMHG | OXYGEN SATURATION: 95 % | TEMPERATURE: 98.5 F

## 2020-10-22 PROCEDURE — 74011250636 HC RX REV CODE- 250/636: Performed by: INTERNAL MEDICINE

## 2020-10-22 PROCEDURE — 96372 THER/PROPH/DIAG INJ SC/IM: CPT

## 2020-10-22 RX ADMIN — ERYTHROPOIETIN 40000 UNITS: 40000 INJECTION, SOLUTION INTRAVENOUS; SUBCUTANEOUS at 15:14

## 2020-10-22 NOTE — PROGRESS NOTES
Outpatient Infusion Center Short Visit Progress Note    Patient admitted to [facility] for Procrit ambulatory via walker in stable condition. Assessment completed. No new concerns voiced. Per order - patient to receive procrit. No labs needed. Labs already drawn from MD office hgb 9.6     Vital Signs:  Visit Vitals  /61 (BP 1 Location: Left arm, BP Patient Position: Sitting)   Pulse 75   Temp 98.5 °F (36.9 °C)   Resp 18   Wt 102.7 kg (226 lb 8 oz)   SpO2 95%   Breastfeeding No     Medications:  Medications Administered     epoetin malcolm (EPOGEN;PROCRIT) injection 40,000 Units     Admin Date  10/22/2020 Action  Given Dose  16715 Units Route  SubCUTAneous Administered By  Apoorva Foss RN              Given SQ in left upper arm. Patient tolerated treatment well. Patient discharged from [place] [address] ambulatory in no distress. Patient aware of next appointment.     Future Appointments   Date Time Provider Tootie Hudson   11/13/2020  2:00 PM SS INF5 CH4 <1H [facility]   12/11/2020  2:00 PM SS INF5 CH4 <1H [facility]

## 2020-11-13 ENCOUNTER — HOSPITAL ENCOUNTER (OUTPATIENT)
Dept: INFUSION THERAPY | Age: 75
End: 2020-11-13

## 2020-11-20 ENCOUNTER — HOSPITAL ENCOUNTER (OUTPATIENT)
Dept: INFUSION THERAPY | Age: 75
Discharge: HOME | End: 2020-11-20
Payer: MEDICARE

## 2020-11-20 VITALS
TEMPERATURE: 97.8 F | DIASTOLIC BLOOD PRESSURE: 81 MMHG | HEART RATE: 73 BPM | SYSTOLIC BLOOD PRESSURE: 150 MMHG | RESPIRATION RATE: 18 BRPM | OXYGEN SATURATION: 98 %

## 2020-11-20 LAB
ALBUMIN SERPL-MCNC: 3.6 G/DL (ref 3.5–5)
ANION GAP SERPL CALC-SCNC: 5 MMOL/L (ref 5–15)
BUN SERPL-MCNC: 65 MG/DL (ref 6–20)
BUN/CREAT SERPL: 10 (ref 12–20)
CALCIUM SERPL-MCNC: 8.3 MG/DL (ref 8.5–10.1)
CALCIUM SERPL-MCNC: 8.3 MG/DL (ref 8.5–10.1)
CHLORIDE SERPL-SCNC: 110 MMOL/L (ref 97–108)
CO2 SERPL-SCNC: 23 MMOL/L (ref 21–32)
CREAT SERPL-MCNC: 6.58 MG/DL (ref 0.55–1.02)
ERYTHROCYTE [DISTWIDTH] IN BLOOD BY AUTOMATED COUNT: 14.3 % (ref 11.5–14.5)
FERRITIN SERPL-MCNC: 343 NG/ML (ref 26–388)
GLUCOSE SERPL-MCNC: 143 MG/DL (ref 65–100)
HCT VFR BLD AUTO: 30.7 % (ref 35–47)
HGB BLD-MCNC: 9.5 G/DL (ref 11.5–16)
IRON SATN MFR SERPL: 60 % (ref 20–50)
IRON SERPL-MCNC: 136 UG/DL (ref 35–150)
MCH RBC QN AUTO: 28.4 PG (ref 26–34)
MCHC RBC AUTO-ENTMCNC: 30.9 G/DL (ref 30–36.5)
MCV RBC AUTO: 91.6 FL (ref 80–99)
NRBC # BLD: 0 K/UL (ref 0–0.01)
NRBC BLD-RTO: 0 PER 100 WBC
PHOSPHATE SERPL-MCNC: 6.4 MG/DL (ref 2.6–4.7)
PLATELET # BLD AUTO: 252 K/UL (ref 150–400)
PMV BLD AUTO: 8.8 FL (ref 8.9–12.9)
POTASSIUM SERPL-SCNC: 4 MMOL/L (ref 3.5–5.1)
PTH-INTACT SERPL-MCNC: 497.2 PG/ML (ref 18.4–88)
RBC # BLD AUTO: 3.35 M/UL (ref 3.8–5.2)
SODIUM SERPL-SCNC: 138 MMOL/L (ref 136–145)
TIBC SERPL-MCNC: 228 UG/DL (ref 250–450)
WBC # BLD AUTO: 4.3 K/UL (ref 3.6–11)

## 2020-11-20 PROCEDURE — 82728 ASSAY OF FERRITIN: CPT

## 2020-11-20 PROCEDURE — 83540 ASSAY OF IRON: CPT

## 2020-11-20 PROCEDURE — 80069 RENAL FUNCTION PANEL: CPT

## 2020-11-20 PROCEDURE — 36415 COLL VENOUS BLD VENIPUNCTURE: CPT

## 2020-11-20 PROCEDURE — 83970 ASSAY OF PARATHORMONE: CPT

## 2020-11-20 PROCEDURE — 74011250636 HC RX REV CODE- 250/636: Performed by: INTERNAL MEDICINE

## 2020-11-20 PROCEDURE — 96372 THER/PROPH/DIAG INJ SC/IM: CPT

## 2020-11-20 PROCEDURE — 85027 COMPLETE CBC AUTOMATED: CPT

## 2020-11-20 RX ADMIN — EPOETIN ALFA-EPBX 40000 UNITS: 40000 INJECTION, SOLUTION INTRAVENOUS; SUBCUTANEOUS at 14:47

## 2020-11-20 NOTE — PROGRESS NOTES
Outpatient Infusion Center Short Visit Progress Note    1350 Patient admitted to [facility] for R [place]inho 54 ambulatory in stable condition. Assessment completed. No new concerns voiced. Vital Signs:  Visit Vitals  BP (!) 150/81 (BP 1 Location: Left arm, BP Patient Position: At rest) Comment (BP 1 Location): lower arm   Pulse 73   Temp 97.8 °F (36.6 °C)   Resp 18   SpO2 98%   Breastfeeding No         Labs drawn peripherally from right [facility] and sent for processing. Lab Results:  Recent Results (from the past 12 hour(s))   CBC W/O DIFF    Collection Time: 11/20/20  1:59 PM   Result Value Ref Range    WBC 4.3 3.6 - 11.0 K/uL    RBC 3.35 (L) 3.80 - 5.20 M/uL    HGB 9.5 (L) 11.5 - 16.0 g/dL    HCT 30.7 (L) 35.0 - 47.0 %    MCV 91.6 80.0 - 99.0 FL    MCH 28.4 26.0 - 34.0 PG    MCHC 30.9 30.0 - 36.5 g/dL    RDW 14.3 11.5 - 14.5 %    PLATELET 265 771 - 613 K/uL    MPV 8.8 (L) 8.9 - 12.9 FL    NRBC 0.0 0  WBC    ABSOLUTE NRBC 0.00 0.00 - 0.01 K/uL   RENAL FUNCTION PANEL    Collection Time: 11/20/20  1:59 PM   Result Value Ref Range    Sodium 138 136 - 145 mmol/L    Potassium 4.0 3.5 - 5.1 mmol/L    Chloride 110 (H) 97 - 108 mmol/L    CO2 23 21 - 32 mmol/L    Anion gap 5 5 - 15 mmol/L    Glucose 143 (H) 65 - 100 mg/dL    BUN 65 (H) 6 - 20 MG/DL    Creatinine 6.58 (H) 0.55 - 1.02 MG/DL    BUN/Creatinine ratio 10 (L) 12 - 20      GFR est AA 7 (L) >60 ml/min/1.73m2    GFR est non-AA 6 (L) >60 ml/min/1.73m2    Calcium 8.3 (L) 8.5 - 10.1 MG/DL    Phosphorus 6.4 (H) 2.6 - 4.7 MG/DL    Albumin 3.6 3.5 - 5.0 g/dL           Medications:  Medications Administered     epoetin malcolm-epbx (RETACRIT) injection 40,000 Units     Admin Date  11/20/2020 Action  Given Dose  27803 Units Route  SubCUTAneous Administered By  Allyson Tello RN                Patient tolerated treatment well. Patient discharged from [facility] 58 ambulatory in no distress at 1450. Patient aware of next appointment.     Future Appointments   Date Time Tina Hudson   12/18/2020  1:30 PM [facility]   1/15/2021  1:00 PM SS INF7 CH2 <1H [facility]   2/12/2021  1:30 PM SS INF7 CH2 <1H [facility] Nataliya García

## 2020-12-11 ENCOUNTER — APPOINTMENT (OUTPATIENT)
Dept: INFUSION THERAPY | Age: 75
End: 2020-12-11
Payer: MEDICARE

## 2020-12-18 ENCOUNTER — HOSPITAL ENCOUNTER (OUTPATIENT)
Dept: INFUSION THERAPY | Age: 75
Discharge: HOME | End: 2020-12-18
Payer: MEDICARE

## 2020-12-18 VITALS
DIASTOLIC BLOOD PRESSURE: 63 MMHG | OXYGEN SATURATION: 97 % | RESPIRATION RATE: 16 BRPM | TEMPERATURE: 98.2 F | SYSTOLIC BLOOD PRESSURE: 141 MMHG | HEART RATE: 86 BPM

## 2020-12-18 LAB
ALBUMIN SERPL-MCNC: 3.4 G/DL (ref 3.5–5)
ANION GAP SERPL CALC-SCNC: 10 MMOL/L (ref 5–15)
BUN SERPL-MCNC: 84 MG/DL (ref 6–20)
BUN/CREAT SERPL: 11 (ref 12–20)
CALCIUM SERPL-MCNC: 7.9 MG/DL (ref 8.5–10.1)
CHLORIDE SERPL-SCNC: 110 MMOL/L (ref 97–108)
CO2 SERPL-SCNC: 20 MMOL/L (ref 21–32)
CREAT SERPL-MCNC: 7.96 MG/DL (ref 0.55–1.02)
ERYTHROCYTE [DISTWIDTH] IN BLOOD BY AUTOMATED COUNT: 14.4 % (ref 11.5–14.5)
GLUCOSE SERPL-MCNC: 163 MG/DL (ref 65–100)
HCT VFR BLD AUTO: 29.6 % (ref 35–47)
HGB BLD-MCNC: 9.6 G/DL (ref 11.5–16)
IRON SATN MFR SERPL: 81 % (ref 20–50)
IRON SERPL-MCNC: 170 UG/DL (ref 35–150)
MCH RBC QN AUTO: 29 PG (ref 26–34)
MCHC RBC AUTO-ENTMCNC: 32.4 G/DL (ref 30–36.5)
MCV RBC AUTO: 89.4 FL (ref 80–99)
NRBC # BLD: 0 K/UL (ref 0–0.01)
NRBC BLD-RTO: 0 PER 100 WBC
PHOSPHATE SERPL-MCNC: 6.8 MG/DL (ref 2.6–4.7)
PLATELET # BLD AUTO: 272 K/UL (ref 150–400)
PMV BLD AUTO: 9 FL (ref 8.9–12.9)
POTASSIUM SERPL-SCNC: 3.9 MMOL/L (ref 3.5–5.1)
RBC # BLD AUTO: 3.31 M/UL (ref 3.8–5.2)
SODIUM SERPL-SCNC: 140 MMOL/L (ref 136–145)
TIBC SERPL-MCNC: 210 UG/DL (ref 250–450)
WBC # BLD AUTO: 3.9 K/UL (ref 3.6–11)

## 2020-12-18 PROCEDURE — 85027 COMPLETE CBC AUTOMATED: CPT

## 2020-12-18 PROCEDURE — 80069 RENAL FUNCTION PANEL: CPT

## 2020-12-18 PROCEDURE — 36415 COLL VENOUS BLD VENIPUNCTURE: CPT

## 2020-12-18 PROCEDURE — 74011250636 HC RX REV CODE- 250/636: Performed by: INTERNAL MEDICINE

## 2020-12-18 PROCEDURE — 96372 THER/PROPH/DIAG INJ SC/IM: CPT

## 2020-12-18 PROCEDURE — 83540 ASSAY OF IRON: CPT

## 2020-12-18 RX ADMIN — EPOETIN ALFA-EPBX 40000 UNITS: 40000 INJECTION, SOLUTION INTRAVENOUS; SUBCUTANEOUS at 14:18

## 2020-12-18 NOTE — PROGRESS NOTES
\[facility]\"" Progress Note    Date: 2020    Name: Audrey Quinn    MRN: [id number]         : 1945    Ms. Nugent Arrived ambulatory and in no distress for retacrit Injection. Assessment was completed, no acute issues at this time, no new complaints voiced. Labs drawn from [facility] without difficulty and sent for processing. Ms. Reynolds Hearing vitals were reviewed. Visit Vitals  BP (!) 141/63   Pulse 86   Temp 98.2 °F (36.8 °C)   Resp 16   SpO2 97%   Breastfeeding No     Recent Results (from the past 12 hour(s))   CBC W/O DIFF    Collection Time: 20  1:40 PM   Result Value Ref Range    WBC 3.9 3.6 - 11.0 K/uL    RBC 3.31 (L) 3.80 - 5.20 M/uL    HGB 9.6 (L) 11.5 - 16.0 g/dL    HCT 29.6 (L) 35.0 - 47.0 %    MCV 89.4 80.0 - 99.0 FL    MCH 29.0 26.0 - 34.0 PG    MCHC 32.4 30.0 - 36.5 g/dL    RDW 14.4 11.5 - 14.5 %    PLATELET 632 207 - 981 K/uL    MPV 9.0 8.9 - 12.9 FL    NRBC 0.0 0  WBC    ABSOLUTE NRBC 0.00 0.00 - 0.01 K/uL   RENAL FUNCTION PANEL    Collection Time: 20  1:40 PM   Result Value Ref Range    Sodium 140 136 - 145 mmol/L    Potassium 3.9 3.5 - 5.1 mmol/L    Chloride 110 (H) 97 - 108 mmol/L    CO2 20 (L) 21 - 32 mmol/L    Anion gap 10 5 - 15 mmol/L    Glucose 163 (H) 65 - 100 mg/dL    BUN 84 (H) 6 - 20 MG/DL    Creatinine 7.96 (H) 0.55 - 1.02 MG/DL    BUN/Creatinine ratio 11 (L) 12 - 20      GFR est AA 6 (L) >60 ml/min/1.73m2    GFR est non-AA 5 (L) >60 ml/min/1.73m2    Calcium 7.9 (L) 8.5 - 10.1 MG/DL    Phosphorus 6.8 (H) 2.6 - 4.7 MG/DL    Albumin 3.4 (L) 3.5 - 5.0 g/dL         Medications:  Medications Administered     epoetin malcolm-epbx (RETACRIT) injection 40,000 Units     Admin Date  2020 Action  Given Dose  40,000 Units Route  SubCUTAneous Administered By  Radha Lal, RN                  Ms. Deshawn Ervin tolerated treatment well and was discharged from [place] [address] in stable condition.    She is to return on January 15 at 1300 for her next appointment.     Jasper Gross RN  December 18, 2020

## 2021-01-08 ENCOUNTER — APPOINTMENT (OUTPATIENT)
Dept: INFUSION THERAPY | Age: 76
End: 2021-01-08
Payer: MEDICARE

## 2021-01-15 ENCOUNTER — HOSPITAL ENCOUNTER (OUTPATIENT)
Dept: INFUSION THERAPY | Age: 76
Discharge: HOME | End: 2021-01-15
Payer: MEDICARE

## 2021-01-15 VITALS
HEART RATE: 76 BPM | TEMPERATURE: 99.2 F | SYSTOLIC BLOOD PRESSURE: 136 MMHG | DIASTOLIC BLOOD PRESSURE: 69 MMHG | OXYGEN SATURATION: 96 % | RESPIRATION RATE: 18 BRPM

## 2021-01-15 LAB
ALBUMIN SERPL-MCNC: 3.6 G/DL (ref 3.5–5)
ANION GAP SERPL CALC-SCNC: 8 MMOL/L (ref 5–15)
BUN SERPL-MCNC: 90 MG/DL (ref 6–20)
BUN/CREAT SERPL: 11 (ref 12–20)
CALCIUM SERPL-MCNC: 8.2 MG/DL (ref 8.5–10.1)
CHLORIDE SERPL-SCNC: 110 MMOL/L (ref 97–108)
CO2 SERPL-SCNC: 20 MMOL/L (ref 21–32)
CREAT SERPL-MCNC: 8.41 MG/DL (ref 0.55–1.02)
ERYTHROCYTE [DISTWIDTH] IN BLOOD BY AUTOMATED COUNT: 14.5 % (ref 11.5–14.5)
GLUCOSE SERPL-MCNC: 116 MG/DL (ref 65–100)
HCT VFR BLD AUTO: 27.1 % (ref 35–47)
HGB BLD-MCNC: 8.6 G/DL (ref 11.5–16)
IRON SATN MFR SERPL: 65 % (ref 20–50)
IRON SERPL-MCNC: 150 UG/DL (ref 35–150)
MCH RBC QN AUTO: 29.1 PG (ref 26–34)
MCHC RBC AUTO-ENTMCNC: 31.7 G/DL (ref 30–36.5)
MCV RBC AUTO: 91.6 FL (ref 80–99)
NRBC # BLD: 0 K/UL (ref 0–0.01)
NRBC BLD-RTO: 0 PER 100 WBC
PHOSPHATE SERPL-MCNC: 5.2 MG/DL (ref 2.6–4.7)
PLATELET # BLD AUTO: 240 K/UL (ref 150–400)
PMV BLD AUTO: 9.7 FL (ref 8.9–12.9)
POTASSIUM SERPL-SCNC: 4.1 MMOL/L (ref 3.5–5.1)
RBC # BLD AUTO: 2.96 M/UL (ref 3.8–5.2)
SODIUM SERPL-SCNC: 138 MMOL/L (ref 136–145)
TIBC SERPL-MCNC: 232 UG/DL (ref 250–450)
WBC # BLD AUTO: 4.8 K/UL (ref 3.6–11)

## 2021-01-15 PROCEDURE — 36415 COLL VENOUS BLD VENIPUNCTURE: CPT

## 2021-01-15 PROCEDURE — 96372 THER/PROPH/DIAG INJ SC/IM: CPT

## 2021-01-15 PROCEDURE — 83540 ASSAY OF IRON: CPT

## 2021-01-15 PROCEDURE — 85027 COMPLETE CBC AUTOMATED: CPT

## 2021-01-15 PROCEDURE — 74011250636 HC RX REV CODE- 250/636: Performed by: INTERNAL MEDICINE

## 2021-01-15 PROCEDURE — 80069 RENAL FUNCTION PANEL: CPT

## 2021-01-15 RX ADMIN — EPOETIN ALFA-EPBX 40000 UNITS: 40000 INJECTION, SOLUTION INTRAVENOUS; SUBCUTANEOUS at 14:09

## 2021-01-15 NOTE — PROGRESS NOTES
[facility] Progress Note    Date: January 15, 2021    Name: Leela Car    MRN: [id number]         : 1945    Ms. Nugent Arrived ambulatory and in no distress for Retacrit Injection. Assessment was completed, no acute issues at this time, no new complaints voiced. Labs drawn peripherally and sent for processing. Ms. Bryce Roy vitals were reviewed. Visit Vitals  /69 (BP 1 Location: Left arm, BP Patient Position: At rest)   Pulse 76   Temp 99.2 °F (37.3 °C)   Resp 18   SpO2 96%     Recent Results (from the past 8 hour(s))   CBC W/O DIFF    Collection Time: 01/15/21  1:14 PM   Result Value Ref Range    WBC 4.8 3.6 - 11.0 K/uL    RBC 2.96 (L) 3.80 - 5.20 M/uL    HGB 8.6 (L) 11.5 - 16.0 g/dL    HCT 27.1 (L) 35.0 - 47.0 %    MCV 91.6 80.0 - 99.0 FL    MCH 29.1 26.0 - 34.0 PG    MCHC 31.7 30.0 - 36.5 g/dL    RDW 14.5 11.5 - 14.5 %    PLATELET 599 995 - 914 K/uL    MPV 9.7 8.9 - 12.9 FL    NRBC 0.0 0  WBC    ABSOLUTE NRBC 0.00 0.00 - 0.01 K/uL   RENAL FUNCTION PANEL    Collection Time: 01/15/21  1:14 PM   Result Value Ref Range    Sodium 138 136 - 145 mmol/L    Potassium 4.1 3.5 - 5.1 mmol/L    Chloride 110 (H) 97 - 108 mmol/L    CO2 20 (L) 21 - 32 mmol/L    Anion gap 8 5 - 15 mmol/L    Glucose 116 (H) 65 - 100 mg/dL    BUN 90 (H) 6 - 20 MG/DL    Creatinine 8.41 (H) 0.55 - 1.02 MG/DL    BUN/Creatinine ratio 11 (L) 12 - 20      GFR est AA 6 (L) >60 ml/min/1.73m2    GFR est non-AA 5 (L) >60 ml/min/1.73m2    Calcium 8.2 (L) 8.5 - 10.1 MG/DL    Phosphorus 5.2 (H) 2.6 - 4.7 MG/DL    Albumin 3.6 3.5 - 5.0 g/dL     Labs within parameters to treat. Medications:  Medications Administered     epoetin malcolm-epbx (RETACRIT) injection 40,000 Units     Admin Date  01/15/2021 Action  Given Dose  40,000 Units Route  SubCUTAneous Administered By  Genevieve Dunn RN              Ms. Marii Zimmerman tolerated treatment well and was discharged from [place] [address] in stable condition.    She is aware of future appointments.      Future Appointments   Date Time Provider Tootie Becca   2/12/2021  1:30 PM [facility]   3/12/2021  1:30 PM SS [facility]   4/9/2021  1:30 PM SS IN[facility]7 [facility]   5/7/2021  1:30 PM SS INF7 CH2 [facility] Kavon Perdomo RN  January 15, 2021 asx, told she has ectopic on outpt study.

## 2021-02-05 ENCOUNTER — APPOINTMENT (OUTPATIENT)
Dept: INFUSION THERAPY | Age: 76
End: 2021-02-05
Payer: MEDICARE

## 2021-02-12 ENCOUNTER — HOSPITAL ENCOUNTER (OUTPATIENT)
Dept: INFUSION THERAPY | Age: 76
Discharge: HOME | End: 2021-02-12
Payer: MEDICARE

## 2021-02-12 VITALS
TEMPERATURE: 97.3 F | OXYGEN SATURATION: 95 % | SYSTOLIC BLOOD PRESSURE: 171 MMHG | HEART RATE: 88 BPM | RESPIRATION RATE: 20 BRPM | WEIGHT: 237.2 LBS | DIASTOLIC BLOOD PRESSURE: 74 MMHG | BODY MASS INDEX: 44.78 KG/M2 | HEIGHT: 61 IN

## 2021-02-12 LAB
ALBUMIN SERPL-MCNC: 3.8 G/DL (ref 3.5–5)
ANION GAP SERPL CALC-SCNC: 9 MMOL/L (ref 5–15)
BUN SERPL-MCNC: 74 MG/DL (ref 6–20)
BUN/CREAT SERPL: 9 (ref 12–20)
CALCIUM SERPL-MCNC: 8.5 MG/DL (ref 8.5–10.1)
CALCIUM SERPL-MCNC: 8.7 MG/DL (ref 8.5–10.1)
CHLORIDE SERPL-SCNC: 107 MMOL/L (ref 97–108)
CO2 SERPL-SCNC: 22 MMOL/L (ref 21–32)
CREAT SERPL-MCNC: 7.83 MG/DL (ref 0.55–1.02)
ERYTHROCYTE [DISTWIDTH] IN BLOOD BY AUTOMATED COUNT: 13.9 % (ref 11.5–14.5)
FERRITIN SERPL-MCNC: 291 NG/ML (ref 26–388)
GLUCOSE SERPL-MCNC: 182 MG/DL (ref 65–100)
HCT VFR BLD AUTO: 28.9 % (ref 35–47)
HGB BLD-MCNC: 9 G/DL (ref 11.5–16)
IRON SATN MFR SERPL: 44 % (ref 20–50)
IRON SERPL-MCNC: 105 UG/DL (ref 35–150)
MCH RBC QN AUTO: 28.8 PG (ref 26–34)
MCHC RBC AUTO-ENTMCNC: 31.1 G/DL (ref 30–36.5)
MCV RBC AUTO: 92.6 FL (ref 80–99)
NRBC # BLD: 0 K/UL (ref 0–0.01)
NRBC BLD-RTO: 0 PER 100 WBC
PHOSPHATE SERPL-MCNC: 4 MG/DL (ref 2.6–4.7)
PLATELET # BLD AUTO: 213 K/UL (ref 150–400)
PMV BLD AUTO: 9.1 FL (ref 8.9–12.9)
POTASSIUM SERPL-SCNC: 4.2 MMOL/L (ref 3.5–5.1)
PTH-INTACT SERPL-MCNC: 481.5 PG/ML (ref 18.4–88)
RBC # BLD AUTO: 3.12 M/UL (ref 3.8–5.2)
SODIUM SERPL-SCNC: 138 MMOL/L (ref 136–145)
TIBC SERPL-MCNC: 238 UG/DL (ref 250–450)
WBC # BLD AUTO: 4.9 K/UL (ref 3.6–11)

## 2021-02-12 PROCEDURE — 85027 COMPLETE CBC AUTOMATED: CPT

## 2021-02-12 PROCEDURE — 96372 THER/PROPH/DIAG INJ SC/IM: CPT

## 2021-02-12 PROCEDURE — 82728 ASSAY OF FERRITIN: CPT

## 2021-02-12 PROCEDURE — 83540 ASSAY OF IRON: CPT

## 2021-02-12 PROCEDURE — 80069 RENAL FUNCTION PANEL: CPT

## 2021-02-12 PROCEDURE — 36415 COLL VENOUS BLD VENIPUNCTURE: CPT

## 2021-02-12 PROCEDURE — 74011250636 HC RX REV CODE- 250/636: Performed by: INTERNAL MEDICINE

## 2021-02-12 PROCEDURE — 83970 ASSAY OF PARATHORMONE: CPT

## 2021-02-12 RX ADMIN — EPOETIN ALFA-EPBX 40000 UNITS: 40000 INJECTION, SOLUTION INTRAVENOUS; SUBCUTANEOUS at 14:06

## 2021-02-12 NOTE — PROGRESS NOTES
[facility] Progress Note    Date: 2021    Name: Trey Haddad    MRN: [id number]         : 1945    Ms. Reina Zhang Arrived ambulatory with a walker in no distress for Retacrit Injection. Assessment was completed, no acute issues at this time, no new complaints voiced. Labs drawn peripherally and sent for processing. Ms. Juanita Hendricks vitals were reviewed. Visit Vitals  BP (!) 171/74 (BP 1 Location: Right upper arm, BP Patient Position: Sitting)   Pulse 88   Temp 97.3 °F (36.3 °C)   Resp 20   Ht 5' 1\" (1.549 m)   Wt 107.6 kg (237 lb 3.2 oz)   SpO2 95%   Breastfeeding No   BMI 44.82 kg/m²           Recent Results (from the past 24 hour(s))   CBC W/O DIFF    Collection Time: 21  1:35 PM   Result Value Ref Range    WBC 4.9 3.6 - 11.0 K/uL    RBC 3.12 (L) 3.80 - 5.20 M/uL    HGB 9.0 (L) 11.5 - 16.0 g/dL    HCT 28.9 (L) 35.0 - 47.0 %    MCV 92.6 80.0 - 99.0 FL    MCH 28.8 26.0 - 34.0 PG    MCHC 31.1 30.0 - 36.5 g/dL    RDW 13.9 11.5 - 14.5 %    PLATELET 038 263 - 063 K/uL    MPV 9.1 8.9 - 12.9 FL    NRBC 0.0 0  WBC    ABSOLUTE NRBC 0.00 0.00 - 0.01 K/uL   RENAL FUNCTION PANEL    Collection Time: 21  1:35 PM   Result Value Ref Range    Sodium 138 136 - 145 mmol/L    Potassium 4.2 3.5 - 5.1 mmol/L    Chloride 107 97 - 108 mmol/L    CO2 22 21 - 32 mmol/L    Anion gap 9 5 - 15 mmol/L    Glucose 182 (H) 65 - 100 mg/dL    BUN 74 (H) 6 - 20 MG/DL    Creatinine 7.83 (H) 0.55 - 1.02 MG/DL    BUN/Creatinine ratio 9 (L) 12 - 20      GFR est AA 6 (L) >60 ml/min/1.73m2    GFR est non-AA 5 (L) >60 ml/min/1.73m2    Calcium 8.5 8.5 - 10.1 MG/DL    Phosphorus 4.0 2.6 - 4.7 MG/DL    Albumin 3.8 3.5 - 5.0 g/dL       Additional labs still in process.         Medications:  Medications Administered     epoetin malcolm-epbx (RETACRIT) injection 40,000 Units     Admin Date  2021 Action  Given Dose  40,000 Units Route  SubCUTAneous Administered By  JULISSA Flynn Ms. tolerated treatment well and was discharged from [place] [address] in stable condition. She is to return on March 12 at 1330 for her next appointment.     Lorna Arnold RN  February 12, 2021

## 2021-03-12 ENCOUNTER — HOSPITAL ENCOUNTER (OUTPATIENT)
Dept: INFUSION THERAPY | Age: 76
Discharge: HOME | End: 2021-03-12
Payer: MEDICARE

## 2021-03-12 VITALS
BODY MASS INDEX: 44.74 KG/M2 | RESPIRATION RATE: 18 BRPM | TEMPERATURE: 97 F | HEART RATE: 77 BPM | DIASTOLIC BLOOD PRESSURE: 72 MMHG | OXYGEN SATURATION: 98 % | WEIGHT: 236.8 LBS | SYSTOLIC BLOOD PRESSURE: 159 MMHG

## 2021-03-12 LAB
ALBUMIN SERPL-MCNC: 3.4 G/DL (ref 3.5–5)
ANION GAP SERPL CALC-SCNC: 9 MMOL/L (ref 5–15)
BUN SERPL-MCNC: 85 MG/DL (ref 6–20)
BUN/CREAT SERPL: 10 (ref 12–20)
CALCIUM SERPL-MCNC: 8.2 MG/DL (ref 8.5–10.1)
CHLORIDE SERPL-SCNC: 109 MMOL/L (ref 97–108)
CO2 SERPL-SCNC: 22 MMOL/L (ref 21–32)
CREAT SERPL-MCNC: 8.49 MG/DL (ref 0.55–1.02)
ERYTHROCYTE [DISTWIDTH] IN BLOOD BY AUTOMATED COUNT: 13.7 % (ref 11.5–14.5)
GLUCOSE SERPL-MCNC: 97 MG/DL (ref 65–100)
HCT VFR BLD AUTO: 26.2 % (ref 35–47)
HGB BLD-MCNC: 8 G/DL (ref 11.5–16)
IRON SATN MFR SERPL: 52 % (ref 20–50)
IRON SERPL-MCNC: 109 UG/DL (ref 35–150)
MCH RBC QN AUTO: 28.2 PG (ref 26–34)
MCHC RBC AUTO-ENTMCNC: 30.5 G/DL (ref 30–36.5)
MCV RBC AUTO: 92.3 FL (ref 80–99)
NRBC # BLD: 0 K/UL (ref 0–0.01)
NRBC BLD-RTO: 0 PER 100 WBC
PHOSPHATE SERPL-MCNC: 5.6 MG/DL (ref 2.6–4.7)
PLATELET # BLD AUTO: 238 K/UL (ref 150–400)
PMV BLD AUTO: 9.7 FL (ref 8.9–12.9)
POTASSIUM SERPL-SCNC: 4.3 MMOL/L (ref 3.5–5.1)
RBC # BLD AUTO: 2.84 M/UL (ref 3.8–5.2)
SODIUM SERPL-SCNC: 140 MMOL/L (ref 136–145)
TIBC SERPL-MCNC: 210 UG/DL (ref 250–450)
WBC # BLD AUTO: 4.4 K/UL (ref 3.6–11)

## 2021-03-12 PROCEDURE — 96372 THER/PROPH/DIAG INJ SC/IM: CPT

## 2021-03-12 PROCEDURE — 83540 ASSAY OF IRON: CPT

## 2021-03-12 PROCEDURE — 85027 COMPLETE CBC AUTOMATED: CPT

## 2021-03-12 PROCEDURE — 74011250636 HC RX REV CODE- 250/636: Performed by: INTERNAL MEDICINE

## 2021-03-12 PROCEDURE — 36415 COLL VENOUS BLD VENIPUNCTURE: CPT

## 2021-03-12 PROCEDURE — 80069 RENAL FUNCTION PANEL: CPT

## 2021-03-12 RX ADMIN — EPOETIN ALFA-EPBX 40000 UNITS: 40000 INJECTION, SOLUTION INTRAVENOUS; SUBCUTANEOUS at 14:44

## 2021-03-12 NOTE — PROGRESS NOTES
[facility] Progress Note    Date: 2021    Name: Marguerite Ziegler    MRN: [id number]         : 1945    Ms. Nugent Arrived ambulatory and in no distress for Retacrit. Assessment was completed, no acute issues at this time, no new complaints voiced. Labs drawn & sent for processing. The following COVID-19 screening questions were asked to the patient:  1. Do you have any symptoms of COVID-19? SOB, coughing, fever, or generally not feeling well? No.  2. Have you been exposed to COVID-19 recently? No.   3. Have you had any recent contact with family/friend that has a pending COVID test?   No.       Ms. Klever Colindres vitals were reviewed. Patient Vitals for the past 12 hrs:   Temp Pulse Resp BP SpO2   21 1344 97 °F (36.1 °C) 77 18 (!) 159/72 98 %       Lab results were obtained and reviewed.   Recent Results (from the past 12 hour(s))   CBC W/O DIFF    Collection Time: 21  1:47 PM   Result Value Ref Range    WBC 4.4 3.6 - 11.0 K/uL    RBC 2.84 (L) 3.80 - 5.20 M/uL    HGB 8.0 (L) 11.5 - 16.0 g/dL    HCT 26.2 (L) 35.0 - 47.0 %    MCV 92.3 80.0 - 99.0 FL    MCH 28.2 26.0 - 34.0 PG    MCHC 30.5 30.0 - 36.5 g/dL    RDW 13.7 11.5 - 14.5 %    PLATELET 285 251 - 289 K/uL    MPV 9.7 8.9 - 12.9 FL    NRBC 0.0 0  WBC    ABSOLUTE NRBC 0.00 0.00 - 0.01 K/uL   RENAL FUNCTION PANEL    Collection Time: 21  1:47 PM   Result Value Ref Range    Sodium 140 136 - 145 mmol/L    Potassium 4.3 3.5 - 5.1 mmol/L    Chloride 109 (H) 97 - 108 mmol/L    CO2 22 21 - 32 mmol/L    Anion gap 9 5 - 15 mmol/L    Glucose 97 65 - 100 mg/dL    BUN 85 (H) 6 - 20 MG/DL    Creatinine 8.49 (H) 0.55 - 1.02 MG/DL    BUN/Creatinine ratio 10 (L) 12 - 20      GFR est AA 6 (L) >60 ml/min/1.73m2    GFR est non-AA 5 (L) >60 ml/min/1.73m2    Calcium 8.2 (L) 8.5 - 10.1 MG/DL    Phosphorus 5.6 (H) 2.6 - 4.7 MG/DL    Albumin 3.4 (L) 3.5 - 5.0 g/dL         Medications:  Medications Administered     epoetin malcolm-epbx (RETACRIT) injection 40,000 Units     Admin Date  03/12/2021 Action  Given Dose  40,000 Units Route  SubCUTAneous Administered By  Devin Galvan RN                  Ms. Marco Melara tolerated treatment well and was discharged from [place] [address] in stable condition at [address] She is to return on April 9 at 1330 for her next appointment.     Dilcia Hurst RN  March 12, 2021

## 2021-04-09 ENCOUNTER — HOSPITAL ENCOUNTER (OUTPATIENT)
Dept: INFUSION THERAPY | Age: 76
Discharge: HOME | End: 2021-04-09
Payer: MEDICARE

## 2021-04-09 VITALS
RESPIRATION RATE: 18 BRPM | SYSTOLIC BLOOD PRESSURE: 145 MMHG | DIASTOLIC BLOOD PRESSURE: 73 MMHG | TEMPERATURE: 97.4 F | OXYGEN SATURATION: 98 % | HEART RATE: 81 BPM

## 2021-04-09 LAB
ALBUMIN SERPL-MCNC: 3.6 G/DL (ref 3.5–5)
ANION GAP SERPL CALC-SCNC: 11 MMOL/L (ref 5–15)
BUN SERPL-MCNC: 68 MG/DL (ref 6–20)
BUN/CREAT SERPL: 8 (ref 12–20)
CALCIUM SERPL-MCNC: 8.6 MG/DL (ref 8.5–10.1)
CHLORIDE SERPL-SCNC: 108 MMOL/L (ref 97–108)
CO2 SERPL-SCNC: 20 MMOL/L (ref 21–32)
CREAT SERPL-MCNC: 8.61 MG/DL (ref 0.55–1.02)
ERYTHROCYTE [DISTWIDTH] IN BLOOD BY AUTOMATED COUNT: 13.7 % (ref 11.5–14.5)
GLUCOSE SERPL-MCNC: 148 MG/DL (ref 65–100)
HCT VFR BLD AUTO: 26.2 % (ref 35–47)
HGB BLD-MCNC: 8.1 G/DL (ref 11.5–16)
IRON SATN MFR SERPL: 38 % (ref 20–50)
IRON SERPL-MCNC: 74 UG/DL (ref 35–150)
MCH RBC QN AUTO: 27.7 PG (ref 26–34)
MCHC RBC AUTO-ENTMCNC: 30.9 G/DL (ref 30–36.5)
MCV RBC AUTO: 89.7 FL (ref 80–99)
NRBC # BLD: 0 K/UL (ref 0–0.01)
NRBC BLD-RTO: 0 PER 100 WBC
PHOSPHATE SERPL-MCNC: 5.4 MG/DL (ref 2.6–4.7)
PLATELET # BLD AUTO: 229 K/UL (ref 150–400)
PMV BLD AUTO: 8.7 FL (ref 8.9–12.9)
POTASSIUM SERPL-SCNC: 4 MMOL/L (ref 3.5–5.1)
RBC # BLD AUTO: 2.92 M/UL (ref 3.8–5.2)
SODIUM SERPL-SCNC: 139 MMOL/L (ref 136–145)
TIBC SERPL-MCNC: 195 UG/DL (ref 250–450)
WBC # BLD AUTO: 4.5 K/UL (ref 3.6–11)

## 2021-04-09 PROCEDURE — 80069 RENAL FUNCTION PANEL: CPT

## 2021-04-09 PROCEDURE — 85027 COMPLETE CBC AUTOMATED: CPT

## 2021-04-09 PROCEDURE — 96372 THER/PROPH/DIAG INJ SC/IM: CPT

## 2021-04-09 PROCEDURE — 74011250636 HC RX REV CODE- 250/636: Performed by: INTERNAL MEDICINE

## 2021-04-09 PROCEDURE — 36415 COLL VENOUS BLD VENIPUNCTURE: CPT

## 2021-04-09 PROCEDURE — 83550 IRON BINDING TEST: CPT

## 2021-04-09 RX ADMIN — EPOETIN ALFA-EPBX 40000 UNITS: 40000 INJECTION, SOLUTION INTRAVENOUS; SUBCUTANEOUS at 14:14

## 2021-04-09 NOTE — PROGRESS NOTES
\[facility] [place] [facility]\"" Progress Note    Date: 2021    Name: Marquise Koenig    MRN: [id number]         : 1945    Ms. Tigre Lainez Arrived ambulatory and in no distress for Retacrit Regimen. Assessment was completed, no acute issues at this time, no new complaints voiced. Labs drawn & sent for processing. Covid questionnaire completed. 1. Do you have any symptoms of COVID-19? SOB, coughing, fever, or generally not feeling well - no    2. Have you been exposed to COVID-19 recently? - no    3. Have you had any recent contact with family/friend that has a pending COVID test? - no    Ms. Nugent's vitals were reviewed. Patient Vitals for the past 12 hrs:   Temp Pulse Resp BP SpO2   21 1338 97.4 °F (36.3 °C) 81 18 (!) 145/73 98 %       Lab results were obtained and CBC reviewed. Recent Results (from the past 12 hour(s))   CBC W/O DIFF    Collection Time: 21  1:44 PM   Result Value Ref Range    WBC 4.5 3.6 - 11.0 K/uL    RBC 2.92 (L) 3.80 - 5.20 M/uL    HGB 8.1 (L) 11.5 - 16.0 g/dL    HCT 26.2 (L) 35.0 - 47.0 %    MCV 89.7 80.0 - 99.0 FL    MCH 27.7 26.0 - 34.0 PG    MCHC 30.9 30.0 - 36.5 g/dL    RDW 13.7 11.5 - 14.5 %    PLATELET 474 092 - 010 K/uL    MPV 8.7 (L) 8.9 - 12.9 FL    NRBC 0.0 0  WBC    ABSOLUTE NRBC 0.00 0.00 - 0.01 K/uL       Medications:  Retacrit SQ    Ms. Nugent tolerated treatment well and was discharged from [place] [address] in stable condition. She is to return on 21 for her next appointment.     Johnyn Monte RN  2021

## 2021-05-07 ENCOUNTER — HOSPITAL ENCOUNTER (OUTPATIENT)
Dept: INFUSION THERAPY | Age: 76
Discharge: HOME | End: 2021-05-07

## 2021-06-04 ENCOUNTER — APPOINTMENT (OUTPATIENT)
Dept: INFUSION THERAPY | Age: 76
End: 2021-06-04

## 2021-07-02 ENCOUNTER — APPOINTMENT (OUTPATIENT)
Dept: INFUSION THERAPY | Age: 76
End: 2021-07-02

## 2023-05-10 RX ORDER — FUROSEMIDE 20 MG/1
TABLET ORAL DAILY
COMMUNITY

## 2023-05-10 RX ORDER — CYCLOBENZAPRINE HCL 5 MG
5 TABLET ORAL 2 TIMES DAILY
COMMUNITY

## 2023-05-10 RX ORDER — CLONAZEPAM 0.5 MG/1
0.5 TABLET ORAL
COMMUNITY
End: 2023-05-19

## 2023-05-10 RX ORDER — GABAPENTIN 100 MG/1
100 CAPSULE ORAL 2 TIMES DAILY
COMMUNITY

## 2023-05-10 RX ORDER — FERROUS SULFATE 325(65) MG
TABLET ORAL
COMMUNITY

## 2023-05-10 RX ORDER — ATENOLOL 50 MG/1
TABLET ORAL DAILY
COMMUNITY
End: 2023-05-19

## 2023-05-10 RX ORDER — EPLERENONE 25 MG/1
25 TABLET, FILM COATED ORAL DAILY
COMMUNITY
End: 2023-05-19

## 2023-05-10 RX ORDER — ALISKIREN 150 MG/1
150 TABLET, FILM COATED ORAL DAILY
COMMUNITY
Start: 2009-06-24 | End: 2023-05-19

## 2023-05-10 RX ORDER — HYDROCODONE BITARTRATE AND ACETAMINOPHEN 7.5; 325 MG/1; MG/1
TABLET ORAL EVERY 6 HOURS PRN
COMMUNITY
End: 2023-05-19

## 2023-05-10 RX ORDER — DULOXETIN HYDROCHLORIDE 60 MG/1
60 CAPSULE, DELAYED RELEASE ORAL DAILY
COMMUNITY

## 2023-05-10 RX ORDER — LOSARTAN POTASSIUM 100 MG/1
50 TABLET ORAL DAILY
COMMUNITY

## 2023-05-10 RX ORDER — AMLODIPINE BESYLATE 10 MG/1
10 TABLET ORAL DAILY
COMMUNITY

## 2023-05-10 RX ORDER — LEVOTHYROXINE SODIUM 0.05 MG/1
50 TABLET ORAL DAILY
COMMUNITY
Start: 2010-04-06

## 2023-05-10 RX ORDER — CARVEDILOL 25 MG/1
25 TABLET ORAL 2 TIMES DAILY
COMMUNITY

## 2023-05-19 ENCOUNTER — HOSPITAL ENCOUNTER (OUTPATIENT)
Facility: HOSPITAL | Age: 78
End: 2023-05-19
Payer: MEDICARE

## 2023-05-19 ENCOUNTER — HOSPITAL ENCOUNTER (OUTPATIENT)
Age: 78
End: 2023-05-19
Payer: MEDICARE

## 2023-05-19 VITALS
WEIGHT: 210 LBS | TEMPERATURE: 99 F | HEIGHT: 61 IN | HEART RATE: 82 BPM | SYSTOLIC BLOOD PRESSURE: 158 MMHG | DIASTOLIC BLOOD PRESSURE: 75 MMHG | BODY MASS INDEX: 39.65 KG/M2 | OXYGEN SATURATION: 99 %

## 2023-05-19 LAB
ALBUMIN SERPL-MCNC: 3.1 G/DL (ref 3.5–5)
ALBUMIN/GLOB SERPL: 0.9 (ref 1.1–2.2)
ALP SERPL-CCNC: 51 U/L (ref 45–117)
ALT SERPL-CCNC: 20 U/L (ref 12–78)
ANION GAP SERPL CALC-SCNC: 6 MMOL/L (ref 5–15)
AST SERPL-CCNC: 16 U/L (ref 15–37)
BASOPHILS # BLD: 0 K/UL (ref 0–0.1)
BASOPHILS NFR BLD: 1 % (ref 0–1)
BILIRUB SERPL-MCNC: 0.4 MG/DL (ref 0.2–1)
BUN SERPL-MCNC: 26 MG/DL (ref 6–20)
BUN/CREAT SERPL: 6 (ref 12–20)
CALCIUM SERPL-MCNC: 9 MG/DL (ref 8.5–10.1)
CHLORIDE SERPL-SCNC: 104 MMOL/L (ref 97–108)
CO2 SERPL-SCNC: 28 MMOL/L (ref 21–32)
CREAT SERPL-MCNC: 4.61 MG/DL (ref 0.55–1.02)
DIFFERENTIAL METHOD BLD: ABNORMAL
EOSINOPHIL # BLD: 0.3 K/UL (ref 0–0.4)
EOSINOPHIL NFR BLD: 6 % (ref 0–7)
ERYTHROCYTE [DISTWIDTH] IN BLOOD BY AUTOMATED COUNT: 16.8 % (ref 11.5–14.5)
EST. AVERAGE GLUCOSE BLD GHB EST-MCNC: 94 MG/DL
GLOBULIN SER CALC-MCNC: 3.3 G/DL (ref 2–4)
GLUCOSE SERPL-MCNC: 156 MG/DL (ref 65–100)
HBA1C MFR BLD: 4.9 % (ref 4–5.6)
HCT VFR BLD AUTO: 32.2 % (ref 35–47)
HGB BLD-MCNC: 9.7 G/DL (ref 11.5–16)
IMM GRANULOCYTES # BLD AUTO: 0 K/UL (ref 0–0.04)
IMM GRANULOCYTES NFR BLD AUTO: 0 % (ref 0–0.5)
LYMPHOCYTES # BLD: 1.1 K/UL (ref 0.8–3.5)
LYMPHOCYTES NFR BLD: 20 % (ref 12–49)
MCH RBC QN AUTO: 28.4 PG (ref 26–34)
MCHC RBC AUTO-ENTMCNC: 30.1 G/DL (ref 30–36.5)
MCV RBC AUTO: 94.4 FL (ref 80–99)
MONOCYTES # BLD: 0.7 K/UL (ref 0–1)
MONOCYTES NFR BLD: 12 % (ref 5–13)
NEUTS SEG # BLD: 3.2 K/UL (ref 1.8–8)
NEUTS SEG NFR BLD: 61 % (ref 32–75)
NRBC # BLD: 0 K/UL (ref 0–0.01)
NRBC BLD-RTO: 0 PER 100 WBC
PLATELET # BLD AUTO: 275 K/UL (ref 150–400)
PMV BLD AUTO: 9.5 FL (ref 8.9–12.9)
POTASSIUM SERPL-SCNC: 3.2 MMOL/L (ref 3.5–5.1)
PROT SERPL-MCNC: 6.4 G/DL (ref 6.4–8.2)
RBC # BLD AUTO: 3.41 M/UL (ref 3.8–5.2)
SODIUM SERPL-SCNC: 138 MMOL/L (ref 136–145)
WBC # BLD AUTO: 5.3 K/UL (ref 3.6–11)

## 2023-05-19 PROCEDURE — 85025 COMPLETE CBC W/AUTO DIFF WBC: CPT

## 2023-05-19 PROCEDURE — 93005 ELECTROCARDIOGRAM TRACING: CPT

## 2023-05-19 PROCEDURE — 83036 HEMOGLOBIN GLYCOSYLATED A1C: CPT

## 2023-05-19 PROCEDURE — 36415 COLL VENOUS BLD VENIPUNCTURE: CPT

## 2023-05-19 PROCEDURE — 71046 X-RAY EXAM CHEST 2 VIEWS: CPT

## 2023-05-19 PROCEDURE — 80053 COMPREHEN METABOLIC PANEL: CPT

## 2023-05-19 RX ORDER — INSULIN GLARGINE 100 [IU]/ML
24 INJECTION, SOLUTION SUBCUTANEOUS NIGHTLY
COMMUNITY

## 2023-05-19 RX ORDER — SEVELAMER HYDROCHLORIDE 800 MG/1
800 TABLET, FILM COATED ORAL
COMMUNITY

## 2023-05-19 RX ORDER — DOCUSATE SODIUM 100 MG/1
100 CAPSULE, LIQUID FILLED ORAL EVERY OTHER DAY
COMMUNITY

## 2023-05-19 RX ORDER — DULOXETIN HYDROCHLORIDE 30 MG/1
30 CAPSULE, DELAYED RELEASE ORAL
COMMUNITY

## 2023-05-19 RX ORDER — BISACODYL 5 MG/1
5 TABLET, DELAYED RELEASE ORAL AS NEEDED
COMMUNITY

## 2023-05-19 NOTE — PERIOP NOTE
Preoperative instructions reviewed with patient. Patient given SSI infection FAQS sheet. Given two bottles of skin prep chlorhexidine soap; given written and verbal instructions on use. Patient was given the opportunity to ask questions on the information provided. Patient instructed to obtain chest X-ray at [address] [place] [address] upon leaving PAT department. Pt c/o dizziness at beginning of pat appt. Pt's vitals were wnl, pt reports hx of vertigo. Dizziness resolved within 10 minutes. Pt reports no other complaints. Pt was provided with apple juice. Pt denied any sx of dizziness at the end of her pat appt and no other complaints.

## 2023-05-19 NOTE — PERIOP NOTE
[facility]  PREOPERATIVE INSTRUCTIONS    Surgery Date:   5/26/23    Your surgeon's office or [facility] staff will call you between 4 PM- 8 PM the day before surgery with your arrival time. If your surgery is on a Monday, you will receive a call the preceding Friday. Please report to [facility] Patient Access/Admitting on the 1st floor. Bring your insurance card, photo identification, and any copayment ( if applicable). If you are going home the same day of your surgery, you must have a responsible adult to drive you home. You need to have a responsible adult to stay with you the first 24 hours after surgery and you should not drive a car for 24 hours following your surgery. Nothing to eat or drink after midnight the night before surgery. This includes no water, gum, mints, coffee, juice, etc.  Please note special instructions, if applicable, below for medications. Do NOT drink alcohol or smoke 24 hours before surgery. STOP smoking for 14 days prior as it helps with breathing and healing after surgery. If you are being admitted to the hospital, please leave personal belongings/luggage in your car until you have an assigned hospital room number. Please wear comfortable clothes. Wear your glasses instead of contacts. We ask that all money, jewelry and valuables be left at home. Wear no make up, particularly mascara, the day of surgery. All body piercings, rings, and jewelry need to be removed and left at home. Please remove any nail polish or artificial nails from your fingernails. Please wear your hair loose or down. Please no pony-tails, buns, or any metal hair accessories. If you shower the morning of surgery, please do not apply any lotions or powders afterwards. You may wear deodorant, unless having breast surgery. Do not shave any body area within 24 hours of your surgery. Please follow all instructions to avoid any potential surgical cancellation.   Should your physical condition change,

## 2023-05-20 LAB
BACTERIA SPEC CULT: NORMAL
BACTERIA SPEC CULT: NORMAL
EKG ATRIAL RATE: 81 BPM
EKG DIAGNOSIS: NORMAL
EKG P AXIS: 69 DEGREES
EKG P-R INTERVAL: 154 MS
EKG Q-T INTERVAL: 400 MS
EKG QRS DURATION: 82 MS
EKG QTC CALCULATION (BAZETT): 464 MS
EKG R AXIS: 45 DEGREES
EKG T AXIS: 60 DEGREES
EKG VENTRICULAR RATE: 81 BPM
SERVICE CMNT-IMP: NORMAL

## 2023-05-22 NOTE — PERIOP NOTE
EKG REVIEWED BY Tobias Beltran NP. OK FOR SURGERY. LEFT MSG WITH MARTINEZ AT DR [facility] RE:ABNORMAL CREAT AND HGB. SHE STATED SHE WOULD LET NURSE Trinh Philip. ALL LABS, CXR, EKG FAXED TO DR [facility] AND ROUTED THROUGH Epic TO PCP.

## 2023-05-26 ENCOUNTER — HOSPITAL ENCOUNTER (OUTPATIENT)
Facility: HOSPITAL | Age: 78
Setting detail: OUTPATIENT SURGERY
Discharge: HOME | End: 2023-05-26
Attending: SURGERY | Admitting: SURGERY
Payer: MEDICARE

## 2023-05-26 ENCOUNTER — ANESTHESIA EVENT (OUTPATIENT)
Facility: HOSPITAL | Age: 78
End: 2023-05-26
Payer: MEDICARE

## 2023-05-26 ENCOUNTER — ANESTHESIA (OUTPATIENT)
Facility: HOSPITAL | Age: 78
End: 2023-05-26
Payer: MEDICARE

## 2023-05-26 VITALS
OXYGEN SATURATION: 96 % | HEART RATE: 73 BPM | SYSTOLIC BLOOD PRESSURE: 139 MMHG | DIASTOLIC BLOOD PRESSURE: 69 MMHG | TEMPERATURE: 97 F | RESPIRATION RATE: 18 BRPM

## 2023-05-26 DIAGNOSIS — N18.6 END STAGE RENAL DISEASE (HCC): Primary | ICD-10-CM

## 2023-05-26 LAB
ANION GAP BLD CALC-SCNC: 10 MMOL/L (ref 10–20)
CA-I BLD-MCNC: 1.16 MMOL/L (ref 1.12–1.32)
CHLORIDE BLD-SCNC: 100 MMOL/L (ref 98–107)
CO2 BLD-SCNC: 27.9 MMOL/L (ref 21–32)
CREAT BLD-MCNC: 5.35 MG/DL (ref 0.6–1.3)
GLUCOSE BLD STRIP.AUTO-MCNC: 99 MG/DL (ref 65–117)
GLUCOSE BLD-MCNC: 107 MG/DL (ref 65–100)
POTASSIUM BLD-SCNC: 3 MMOL/L (ref 3.5–5.1)
SERVICE CMNT-IMP: ABNORMAL
SERVICE CMNT-IMP: NORMAL
SODIUM BLD-SCNC: 137 MMOL/L (ref 136–145)

## 2023-05-26 PROCEDURE — 2500000003 HC RX 250 WO HCPCS: Performed by: SURGERY

## 2023-05-26 PROCEDURE — 2500000003 HC RX 250 WO HCPCS: Performed by: NURSE ANESTHETIST, CERTIFIED REGISTERED

## 2023-05-26 PROCEDURE — 3700000001 HC ADD 15 MINUTES (ANESTHESIA): Performed by: SURGERY

## 2023-05-26 PROCEDURE — 7100000000 HC PACU RECOVERY - FIRST 15 MIN: Performed by: SURGERY

## 2023-05-26 PROCEDURE — 2580000003 HC RX 258: Performed by: NURSE ANESTHETIST, CERTIFIED REGISTERED

## 2023-05-26 PROCEDURE — 6360000002 HC RX W HCPCS: Performed by: SURGERY

## 2023-05-26 PROCEDURE — C1768 GRAFT, VASCULAR: HCPCS | Performed by: SURGERY

## 2023-05-26 PROCEDURE — 80047 BASIC METABLC PNL IONIZED CA: CPT

## 2023-05-26 PROCEDURE — 6360000002 HC RX W HCPCS: Performed by: ANESTHESIOLOGY

## 2023-05-26 PROCEDURE — 6360000002 HC RX W HCPCS

## 2023-05-26 PROCEDURE — 7100000001 HC PACU RECOVERY - ADDTL 15 MIN: Performed by: SURGERY

## 2023-05-26 PROCEDURE — 64415 NJX AA&/STRD BRCH PLXS IMG: CPT | Performed by: ANESTHESIOLOGY

## 2023-05-26 PROCEDURE — 7100000010 HC PHASE II RECOVERY - FIRST 15 MIN: Performed by: SURGERY

## 2023-05-26 PROCEDURE — 2580000003 HC RX 258: Performed by: SURGERY

## 2023-05-26 PROCEDURE — 2709999900 HC NON-CHARGEABLE SUPPLY: Performed by: SURGERY

## 2023-05-26 PROCEDURE — 6360000002 HC RX W HCPCS: Performed by: NURSE ANESTHETIST, CERTIFIED REGISTERED

## 2023-05-26 PROCEDURE — 3600000012 HC SURGERY LEVEL 2 ADDTL 15MIN: Performed by: SURGERY

## 2023-05-26 PROCEDURE — 3700000000 HC ANESTHESIA ATTENDED CARE: Performed by: SURGERY

## 2023-05-26 PROCEDURE — A4217 STERILE WATER/SALINE, 500 ML: HCPCS | Performed by: SURGERY

## 2023-05-26 PROCEDURE — 82962 GLUCOSE BLOOD TEST: CPT

## 2023-05-26 PROCEDURE — 6370000000 HC RX 637 (ALT 250 FOR IP)

## 2023-05-26 PROCEDURE — 3600000002 HC SURGERY LEVEL 2 BASE: Performed by: SURGERY

## 2023-05-26 PROCEDURE — 7100000011 HC PHASE II RECOVERY - ADDTL 15 MIN: Performed by: SURGERY

## 2023-05-26 DEVICE — PROPATEN VASCULAR GRAFT SW 4-7MMX45CM TAPERED HEPARIN
Type: IMPLANTABLE DEVICE | Site: ARM | Status: FUNCTIONAL
Brand: GORE PROPATEN VASCULAR GRAFT

## 2023-05-26 RX ORDER — PROCHLORPERAZINE EDISYLATE 5 MG/ML
5 INJECTION INTRAMUSCULAR; INTRAVENOUS
Status: DISCONTINUED | OUTPATIENT
Start: 2023-05-26 | End: 2023-05-26 | Stop reason: HOSPADM

## 2023-05-26 RX ORDER — EPHEDRINE SULFATE 50 MG/ML
INJECTION INTRAVENOUS PRN
Status: DISCONTINUED | OUTPATIENT
Start: 2023-05-26 | End: 2023-05-26 | Stop reason: SDUPTHER

## 2023-05-26 RX ORDER — LIDOCAINE HYDROCHLORIDE 20 MG/ML
INJECTION, SOLUTION EPIDURAL; INFILTRATION; INTRACAUDAL; PERINEURAL PRN
Status: DISCONTINUED | OUTPATIENT
Start: 2023-05-26 | End: 2023-05-26 | Stop reason: SDUPTHER

## 2023-05-26 RX ORDER — FENTANYL CITRATE 50 UG/ML
INJECTION, SOLUTION INTRAMUSCULAR; INTRAVENOUS PRN
Status: DISCONTINUED | OUTPATIENT
Start: 2023-05-26 | End: 2023-05-26 | Stop reason: SDUPTHER

## 2023-05-26 RX ORDER — MIDAZOLAM HYDROCHLORIDE 1 MG/ML
INJECTION INTRAMUSCULAR; INTRAVENOUS PRN
Status: DISCONTINUED | OUTPATIENT
Start: 2023-05-26 | End: 2023-05-26 | Stop reason: SDUPTHER

## 2023-05-26 RX ORDER — TRAMADOL HYDROCHLORIDE 50 MG/1
50 TABLET ORAL
Status: COMPLETED | OUTPATIENT
Start: 2023-05-26 | End: 2023-05-26

## 2023-05-26 RX ORDER — HEPARIN SODIUM 1000 [USP'U]/ML
1600 INJECTION, SOLUTION INTRAVENOUS; SUBCUTANEOUS AS NEEDED
Status: DISCONTINUED | OUTPATIENT
Start: 2023-05-26 | End: 2023-05-26 | Stop reason: HOSPADM

## 2023-05-26 RX ORDER — SODIUM CHLORIDE, SODIUM LACTATE, POTASSIUM CHLORIDE, CALCIUM CHLORIDE 600; 310; 30; 20 MG/100ML; MG/100ML; MG/100ML; MG/100ML
INJECTION, SOLUTION INTRAVENOUS CONTINUOUS
Status: DISCONTINUED | OUTPATIENT
Start: 2023-05-26 | End: 2023-05-26 | Stop reason: HOSPADM

## 2023-05-26 RX ORDER — KETAMINE HCL IN NACL, ISO-OSM 100MG/10ML
SYRINGE (ML) INJECTION PRN
Status: DISCONTINUED | OUTPATIENT
Start: 2023-05-26 | End: 2023-05-26 | Stop reason: SDUPTHER

## 2023-05-26 RX ORDER — FENTANYL CITRATE 50 UG/ML
INJECTION, SOLUTION INTRAMUSCULAR; INTRAVENOUS
Status: COMPLETED
Start: 2023-05-26 | End: 2023-05-26

## 2023-05-26 RX ORDER — PROPOFOL 10 MG/ML
INJECTION, EMULSION INTRAVENOUS CONTINUOUS PRN
Status: DISCONTINUED | OUTPATIENT
Start: 2023-05-26 | End: 2023-05-26 | Stop reason: SDUPTHER

## 2023-05-26 RX ORDER — CLINDAMYCIN PHOSPHATE 600 MG/50ML
600 INJECTION INTRAVENOUS
Status: COMPLETED | OUTPATIENT
Start: 2023-05-26 | End: 2023-05-26

## 2023-05-26 RX ORDER — FENTANYL CITRATE 50 UG/ML
100 INJECTION, SOLUTION INTRAMUSCULAR; INTRAVENOUS
Status: COMPLETED | OUTPATIENT
Start: 2023-05-26 | End: 2023-05-26

## 2023-05-26 RX ORDER — HYDROMORPHONE HYDROCHLORIDE 1 MG/ML
0.5 INJECTION, SOLUTION INTRAMUSCULAR; INTRAVENOUS; SUBCUTANEOUS EVERY 5 MIN PRN
Status: DISCONTINUED | OUTPATIENT
Start: 2023-05-26 | End: 2023-05-26 | Stop reason: HOSPADM

## 2023-05-26 RX ORDER — LIDOCAINE HYDROCHLORIDE 10 MG/ML
INJECTION, SOLUTION INFILTRATION; PERINEURAL PRN
Status: DISCONTINUED | OUTPATIENT
Start: 2023-05-26 | End: 2023-05-26 | Stop reason: ALTCHOICE

## 2023-05-26 RX ORDER — SODIUM CHLORIDE 0.9 % (FLUSH) 0.9 %
5-40 SYRINGE (ML) INJECTION EVERY 12 HOURS SCHEDULED
Status: DISCONTINUED | OUTPATIENT
Start: 2023-05-26 | End: 2023-05-26 | Stop reason: HOSPADM

## 2023-05-26 RX ORDER — ONDANSETRON 2 MG/ML
4 INJECTION INTRAMUSCULAR; INTRAVENOUS
Status: DISCONTINUED | OUTPATIENT
Start: 2023-05-26 | End: 2023-05-26 | Stop reason: HOSPADM

## 2023-05-26 RX ORDER — LIDOCAINE HYDROCHLORIDE 10 MG/ML
1 INJECTION, SOLUTION EPIDURAL; INFILTRATION; INTRACAUDAL; PERINEURAL
Status: DISCONTINUED | OUTPATIENT
Start: 2023-05-26 | End: 2023-05-26 | Stop reason: HOSPADM

## 2023-05-26 RX ORDER — OXYCODONE HYDROCHLORIDE 5 MG/1
5 TABLET ORAL
Status: DISCONTINUED | OUTPATIENT
Start: 2023-05-26 | End: 2023-05-26 | Stop reason: HOSPADM

## 2023-05-26 RX ORDER — HYDRALAZINE HYDROCHLORIDE 20 MG/ML
10 INJECTION INTRAMUSCULAR; INTRAVENOUS
Status: DISCONTINUED | OUTPATIENT
Start: 2023-05-26 | End: 2023-05-26 | Stop reason: HOSPADM

## 2023-05-26 RX ORDER — LORAZEPAM 2 MG/ML
0.5 INJECTION INTRAMUSCULAR
Status: DISCONTINUED | OUTPATIENT
Start: 2023-05-26 | End: 2023-05-26 | Stop reason: HOSPADM

## 2023-05-26 RX ORDER — TRAMADOL HYDROCHLORIDE 50 MG/1
TABLET ORAL
Status: COMPLETED
Start: 2023-05-26 | End: 2023-05-26

## 2023-05-26 RX ORDER — DIPHENHYDRAMINE HYDROCHLORIDE 50 MG/ML
12.5 INJECTION INTRAMUSCULAR; INTRAVENOUS
Status: DISCONTINUED | OUTPATIENT
Start: 2023-05-26 | End: 2023-05-26 | Stop reason: HOSPADM

## 2023-05-26 RX ORDER — SODIUM CHLORIDE 9 MG/ML
INJECTION, SOLUTION INTRAVENOUS CONTINUOUS PRN
Status: DISCONTINUED | OUTPATIENT
Start: 2023-05-26 | End: 2023-05-26 | Stop reason: SDUPTHER

## 2023-05-26 RX ORDER — SODIUM CHLORIDE 0.9 % (FLUSH) 0.9 %
5-40 SYRINGE (ML) INJECTION PRN
Status: DISCONTINUED | OUTPATIENT
Start: 2023-05-26 | End: 2023-05-26 | Stop reason: HOSPADM

## 2023-05-26 RX ORDER — HEPARIN SODIUM 1000 [USP'U]/ML
INJECTION, SOLUTION INTRAVENOUS; SUBCUTANEOUS PRN
Status: DISCONTINUED | OUTPATIENT
Start: 2023-05-26 | End: 2023-05-26 | Stop reason: SDUPTHER

## 2023-05-26 RX ORDER — ACETAMINOPHEN 500 MG
1000 TABLET ORAL ONCE
Status: DISCONTINUED | OUTPATIENT
Start: 2023-05-26 | End: 2023-05-26 | Stop reason: HOSPADM

## 2023-05-26 RX ORDER — SODIUM CHLORIDE 9 MG/ML
INJECTION, SOLUTION INTRAVENOUS PRN
Status: DISCONTINUED | OUTPATIENT
Start: 2023-05-26 | End: 2023-05-26 | Stop reason: HOSPADM

## 2023-05-26 RX ORDER — FENTANYL CITRATE 50 UG/ML
25 INJECTION, SOLUTION INTRAMUSCULAR; INTRAVENOUS EVERY 5 MIN PRN
Status: DISCONTINUED | OUTPATIENT
Start: 2023-05-26 | End: 2023-05-26 | Stop reason: HOSPADM

## 2023-05-26 RX ORDER — MIDAZOLAM HYDROCHLORIDE 2 MG/2ML
2 INJECTION, SOLUTION INTRAMUSCULAR; INTRAVENOUS
Status: COMPLETED | OUTPATIENT
Start: 2023-05-26 | End: 2023-05-26

## 2023-05-26 RX ORDER — IPRATROPIUM BROMIDE AND ALBUTEROL SULFATE 2.5; .5 MG/3ML; MG/3ML
1 SOLUTION RESPIRATORY (INHALATION)
Status: DISCONTINUED | OUTPATIENT
Start: 2023-05-26 | End: 2023-05-26 | Stop reason: HOSPADM

## 2023-05-26 RX ORDER — DEXMEDETOMIDINE HYDROCHLORIDE 100 UG/ML
INJECTION, SOLUTION INTRAVENOUS PRN
Status: DISCONTINUED | OUTPATIENT
Start: 2023-05-26 | End: 2023-05-26 | Stop reason: SDUPTHER

## 2023-05-26 RX ORDER — HEPARIN SODIUM 1000 [USP'U]/ML
1600 INJECTION, SOLUTION INTRAVENOUS; SUBCUTANEOUS
Status: COMPLETED | OUTPATIENT
Start: 2023-05-26 | End: 2023-05-26

## 2023-05-26 RX ORDER — TRAMADOL HYDROCHLORIDE 50 MG/1
50 TABLET ORAL EVERY 6 HOURS PRN
Qty: 21 TABLET | Refills: 0 | Status: SHIPPED | OUTPATIENT
Start: 2023-05-26 | End: 2023-05-31

## 2023-05-26 RX ADMIN — HEPARIN SODIUM 3000 UNITS: 1000 INJECTION, SOLUTION INTRAVENOUS; SUBCUTANEOUS at 08:11

## 2023-05-26 RX ADMIN — TRAMADOL HYDROCHLORIDE 50 MG: 50 TABLET ORAL at 10:46

## 2023-05-26 RX ADMIN — FENTANYL CITRATE 25 MCG: 50 INJECTION, SOLUTION INTRAMUSCULAR; INTRAVENOUS at 09:42

## 2023-05-26 RX ADMIN — LIDOCAINE HYDROCHLORIDE 80 MG: 20 INJECTION, SOLUTION EPIDURAL; INFILTRATION; INTRACAUDAL; PERINEURAL at 07:35

## 2023-05-26 RX ADMIN — MIDAZOLAM 3 MG: 1 INJECTION INTRAMUSCULAR; INTRAVENOUS at 07:20

## 2023-05-26 RX ADMIN — EPHEDRINE SULFATE 15 MG: 50 INJECTION INTRAVENOUS at 08:02

## 2023-05-26 RX ADMIN — EPHEDRINE SULFATE 10 MG: 50 INJECTION INTRAVENOUS at 07:56

## 2023-05-26 RX ADMIN — SODIUM CHLORIDE: 9 INJECTION, SOLUTION INTRAVENOUS at 07:15

## 2023-05-26 RX ADMIN — FENTANYL CITRATE 50 MCG: 50 INJECTION, SOLUTION INTRAMUSCULAR; INTRAVENOUS at 07:20

## 2023-05-26 RX ADMIN — PHENYLEPHRINE HYDROCHLORIDE 120 MCG: 10 INJECTION INTRAVENOUS at 08:10

## 2023-05-26 RX ADMIN — DEXMEDETOMIDINE HYDROCHLORIDE 10 MCG: 100 INJECTION, SOLUTION, CONCENTRATE INTRAVENOUS at 07:28

## 2023-05-26 RX ADMIN — HEPARIN SODIUM 1600 UNITS: 1000 INJECTION INTRAVENOUS; SUBCUTANEOUS at 10:25

## 2023-05-26 RX ADMIN — EPHEDRINE SULFATE 15 MG: 50 INJECTION INTRAVENOUS at 07:53

## 2023-05-26 RX ADMIN — MEPIVACAINE HYDROCHLORIDE 30 ML: 15 INJECTION, SOLUTION EPIDURAL; INFILTRATION at 07:25

## 2023-05-26 RX ADMIN — PHENYLEPHRINE HYDROCHLORIDE 80 MCG/MIN: 10 INJECTION INTRAVENOUS at 08:21

## 2023-05-26 RX ADMIN — Medication 25 MG: at 07:35

## 2023-05-26 RX ADMIN — FENTANYL CITRATE 25 MCG: 0.05 INJECTION, SOLUTION INTRAMUSCULAR; INTRAVENOUS at 09:42

## 2023-05-26 RX ADMIN — PHENYLEPHRINE HYDROCHLORIDE 120 MCG: 10 INJECTION INTRAVENOUS at 08:12

## 2023-05-26 RX ADMIN — EPHEDRINE SULFATE 10 MG: 50 INJECTION INTRAVENOUS at 08:10

## 2023-05-26 RX ADMIN — DEXMEDETOMIDINE HYDROCHLORIDE 10 MCG: 100 INJECTION, SOLUTION, CONCENTRATE INTRAVENOUS at 07:45

## 2023-05-26 RX ADMIN — CLINDAMYCIN PHOSPHATE 600 MG: 600 INJECTION, SOLUTION INTRAVENOUS at 07:45

## 2023-05-26 RX ADMIN — FENTANYL CITRATE 50 MCG: 50 INJECTION, SOLUTION INTRAMUSCULAR; INTRAVENOUS at 07:22

## 2023-05-26 RX ADMIN — PROPOFOL 50 MCG/KG/MIN: 10 INJECTION, EMULSION INTRAVENOUS at 07:35

## 2023-05-26 RX ADMIN — MIDAZOLAM HYDROCHLORIDE 3 MG: 1 INJECTION, SOLUTION INTRAMUSCULAR; INTRAVENOUS at 07:22

## 2023-05-26 ASSESSMENT — PAIN DESCRIPTION - ORIENTATION
ORIENTATION: LEFT
ORIENTATION: LOWER
ORIENTATION: LEFT

## 2023-05-26 ASSESSMENT — PAIN DESCRIPTION - DESCRIPTORS
DESCRIPTORS: ACHING
DESCRIPTORS: BURNING
DESCRIPTORS: ACHING

## 2023-05-26 ASSESSMENT — PAIN - FUNCTIONAL ASSESSMENT: PAIN_FUNCTIONAL_ASSESSMENT: 0-10

## 2023-05-26 ASSESSMENT — PAIN DESCRIPTION - LOCATION
LOCATION: ARM
LOCATION: ARM
LOCATION: BACK

## 2023-05-26 ASSESSMENT — PAIN SCALES - GENERAL: PAINLEVEL_OUTOF10: 4

## 2023-05-26 NOTE — OP NOTE
Operative Note      Patient: Jose Manuel Jenkins  YOB: 1945  MRN: [id number]    Date of Procedure: 5/26/2023    Pre-Op Diagnosis Codes:     * End stage renal disease ([facility] [address] [N18.6]    Post-Op Diagnosis: Same       Procedure(s):  LEFT UPPER ARM ARTERIOVENOUS GRAFT PLACEMENT (MAC W/REGIONAL)    Surgeon(s):  Anitha Taylor MD    Assistant:   * No surgical staff found *    Anesthesia: Monitor Anesthesia Care    Estimated Blood Loss (mL): Minimal    Complications: None    Specimens:   * No specimens in log *    Implants:  Implant Name Type Inv. Item Serial No.  Lot No. LRB No. Used Action   GRAFT VASC L45CM DIA4-7MM PTFE CBAS HEP SURF STD WALLED - [id number] Vascular grafts GRAFT VASC L45CM DIA4-7MM PTFE CBAS HEP SURF STD WALLED [id number] [employer]WD NA Left 1 Implanted         Drains: * No LDAs found *    Findings: Inflow: L brachial  artery  Outflow: L brachial vein  Conduit: 4-7mm PTFE  Arterial tunneled medial    Detailed Description of Procedure: The patient was transported to the operating room. The patient was placed supine on the operating room table. A regional block had previously been performed in preop. Sedation via monitored anesthesia care. The patient was prepped and draped in a standard fashion. An appropriate surgical timeout was performed with all members of the team present. The patient was administered Ancef for the antibiotic prophylaxis. An incision was made. The left brachial artery and vein were exposed and mobilized over an appropriate distance, dissected free of surrounding structures and encircled with a silastic loop. A graft was selected and tunneled in a subcutaneous plane. Next the arterial anastomosis was performed using a running 6-0 Prolene suture. The anastomosis was appropriately hemostatic. The vessel was bled antegrade and retrograde. The graft was then flushed and clamped. The venous anastomosis was then performed.   Prior to completing

## 2023-05-26 NOTE — H&P
History and Physical    Patient: Lazaro Harris MRN: [id number]  SSN: [id number]    YOB: 1945  Age: 76 y.o. Sex: female      Subjective:      Lazaro Harris is a 76 y.o. female who presents for AV access creatin. Past Medical History:   Diagnosis Date    Arthritis     CHF (congestive heart failure) (HCC)     Diabetes mellitus ([address]     Heart murmur     Hemodialysis patient ([facility] [address]     Hyperlipidemia     Hypertension     Kidney failure     MRSA (methicillin resistant Staphylococcus aureus)     Prolonged emergence from general anesthesia     Seizure ([facility] [address]     AS A CHILD    Sleep apnea     USES CPAP    Thyroid disease     HYPOTHYROIDISM     Past Surgical History:   Procedure Laterality Date    CHOLECYSTECTOMY      DIALYSIS CATHETER INSERTION      R CHEST    DILATION AND CURETTAGE OF UTERUS      PERITONEAL CATHETER INSERTION      PERITONEAL CATHETER REMOVAL        Family History   Problem Relation Age of Onset    No Known Problems Mother     Diabetes Father     Hypertension Father     Heart Disease Father     Lupus Daughter     Anesth Problems Daughter         DIFFICULTY AWAKENING    Cancer Daughter     Pancreatic Cancer Son      Social History     Tobacco Use    Smoking status: Former     Types: Cigarettes     Start date:      Quit date:      Years since quittin.4    Smokeless tobacco: Never   Substance Use Topics    Alcohol use: Not Currently      Prior to Admission medications    Medication Sig Start Date End Date Taking?  Authorizing Provider   DULoxetine (CYMBALTA) 30 MG extended release capsule Take 1 capsule by mouth nightly    Historical Provider, MD   insulin glargine (LANTUS) 100 UNIT/ML injection vial Inject 24 Units into the skin nightly    Historical Provider, MD   sevelamer hcl (RENAGEL) 800 MG tablet Take 1 tablet by mouth 3 times daily (with meals)    Historical Provider, MD   docusate sodium (COLACE) 100 MG capsule Take 1 capsule by mouth every other day

## 2023-05-26 NOTE — ANESTHESIA POSTPROCEDURE EVALUATION
Post-Anesthesia Evaluation and Assessment    Patient: Arpit Fernandez MRN: [id number]  SSN: [id number]    YOB: 1945  Age: 76 y.o. Sex: female      I have evaluated the patient and they are stable and ready for discharge from the PACU. Cardiovascular Function/Vital Signs  Visit Vitals  /69   Pulse 73   Temp 97 °F (36.1 °C) (Oral)   Resp 18   SpO2 96%       Patient is status post Monitor Anesthesia Care anesthesia for Procedure(s):  LEFT UPPER ARM ARTERIOVENOUS GRAFT PLACEMENT (MAC W/REGIONAL). Nausea/Vomiting: None    Postoperative hydration reviewed and adequate. Pain:      Managed    Neurological Status: At baseline    Mental Status, Level of Consciousness: Alert and  oriented to person, place, and time    Pulmonary Status:       Adequate oxygenation and airway patent    Complications related to anesthesia: None    Post-anesthesia assessment completed. No concerns    Signed By: Tomás Roberts MD     May 26, 2023            Department of Anesthesiology  Postprocedure Note    Patient: Arpit Fernandez  MRN: [id number]  YOB: 1945  Date of evaluation: 5/26/2023      Procedure Summary     Date: 05/26/23 Room / Location: [facility] HEART OR [address] [place] [address] OPEN HEART    Anesthesia Start: 0728 Anesthesia Stop: 6435    Procedure: LEFT UPPER ARM ARTERIOVENOUS GRAFT PLACEMENT (MAC W/REGIONAL) (Left: Arm Upper) Diagnosis:       End stage renal disease (HCC)      (End stage renal disease ([facility] [address] [N18.6])    Providers: Guy Hutchinson MD Responsible Provider: Geeta Palafox MD    Anesthesia Type: MAC, regional ASA Status: 3          Anesthesia Type: No value filed.     Garry Phase I: Garry Score: 8    Garry Phase II:        Anesthesia Post Evaluation

## 2023-05-26 NOTE — DISCHARGE INSTRUCTIONS
HAD TRAMADOL AT 1045 AM.     Anesthesia Discharge Instructions    After general anesthesia or intervenous sedation, for 24 hours or while taking prescription Narcotics:  Limit your activities  Do not drive or operate hazardous machinery  If you have not urinated within 8 hours after discharge, please contact your surgeon on call. Do not make important personal or business decisions  Do not drink alcoholic beverages    Report the following to your surgeon:  Excessive pain, swelling, redness or odor of or around the surgical area  Temperature over 100.5 degrees  Nausea and vomiting lasting longer than 4 hours or if unable to take medication  Any signs of decreased circulation or nerve impairment to extremity:  Change in color, persistent numbness, tingling, coldness or increased pain.   Any questions

## 2023-05-26 NOTE — ANESTHESIA PRE PROCEDURE
Department of Anesthesiology  Preprocedure Note       Name:  Jamie Leon   Age:  76 y.o.  :  1945                                          MRN:  [id number]         Date:  2023      Surgeon: Vaughn Steinberg):  Janis Carrillo MD    Procedure: Procedure(s):  LEFT UPPER ARM ARTERIOVENOUS GRAFT PLACEMENT (MAC W/REGIONAL)    Medications prior to admission:   Prior to Admission medications    Medication Sig Start Date End Date Taking? Authorizing Provider   DULoxetine (CYMBALTA) 30 MG extended release capsule Take 1 capsule by mouth nightly    Historical Provider, MD   insulin glargine (LANTUS) 100 UNIT/ML injection vial Inject 24 Units into the skin nightly    Historical Provider, MD   sevelamer hcl (RENAGEL) 800 MG tablet Take 1 tablet by mouth 3 times daily (with meals)    Historical Provider, MD   docusate sodium (COLACE) 100 MG capsule Take 1 capsule by mouth every other day    Historical Provider, MD   bisacodyl (DULCOLAX) 5 MG EC tablet Take 1 tablet by mouth as needed for Constipation    Historical Provider, MD   amLODIPine (NORVASC) 10 MG tablet Take 1 tablet by mouth daily    Ar Automatic Reconciliation   carvedilol (COREG) 25 MG tablet Take 1 tablet by mouth 2 times daily    Ar Automatic Reconciliation   cyclobenzaprine (FLEXERIL) 5 MG tablet Take 1 tablet by mouth 2 times daily    Ar Automatic Reconciliation   DULoxetine (CYMBALTA) 60 MG extended release capsule Take 1 capsule by mouth daily    Ar Automatic Reconciliation   ferrous sulfate (IRON 325) 325 (65 Fe) MG tablet Take by mouth nightly    Ar Automatic Reconciliation   furosemide (LASIX) 20 MG tablet Take by mouth daily    Ar Automatic Reconciliation   gabapentin (NEURONTIN) 100 MG capsule Take 1 capsule by mouth 2 times daily.     Ar Automatic Reconciliation   levothyroxine (SYNTHROID) 50 MCG tablet Take 1 tablet by mouth daily 4/6/10   Ar Automatic Reconciliation   losartan (COZAAR) 100 MG tablet Take 0.5 tablets by mouth daily    Ar

## 2023-05-26 NOTE — ANESTHESIA PROCEDURE NOTES
Peripheral Block    Patient location during procedure: pre-op  Reason for block: procedure for pain, post-op pain management and at surgeon's request  Start time: 5/26/2023 7:20 AM  End time: 5/26/2023 7:25 AM  Staffing  Performed: anesthesiologist   Anesthesiologist: Paolo Rainey MD  Preanesthetic Checklist  Completed: patient identified, IV checked, site marked, risks and benefits discussed, surgical/procedural consents, equipment checked, pre-op evaluation, timeout performed, anesthesia consent given, oxygen available, monitors applied/VS acknowledged and fire risk safety assessment completed and verbalized  Peripheral Block   Patient position: supine  Prep: ChloraPrep  Provider prep: mask and sterile gloves  Patient monitoring: cardiac monitor, continuous pulse ox, frequent blood pressure checks, IV access and oxygen  Block type: Brachial plexus  Interscalene  Laterality: left  Injection technique: single-shot  Guidance: nerve stimulator and ultrasound guided  Local infiltration: ropivacaine  Infiltration strength: 0.5 %  Local infiltration: ropivacaine  Dose: 30 mL    Needle   Needle type: insulated echogenic nerve stimulator needle   Needle gauge: 21 G  Needle localization: anatomical landmarks and nerve stimulator  Needle length: 5 cm  Assessment   Injection assessment: negative aspiration for heme, no paresthesia on injection, local visualized surrounding nerve on ultrasound and no intravascular symptoms  Paresthesia pain: none  Slow fractionated injection: yes  Hemodynamics: stableno  Outcomes: uncomplicated and patient tolerated procedure well    Medications Administered  mepivacaine (CARBOCAINE) injection 1.5% - Perineural   30 mL - 5/26/2023 7:25:00 AM

## 2025-06-05 ENCOUNTER — HOSPITAL ENCOUNTER (INPATIENT)
Facility: HOSPITAL | Age: 80
LOS: 12 days | Discharge: HOME HEALTH | DRG: 314 | End: 2025-06-19
Attending: EMERGENCY MEDICINE | Admitting: INTERNAL MEDICINE
Payer: MEDICARE

## 2025-06-05 ENCOUNTER — APPOINTMENT (OUTPATIENT)
Facility: HOSPITAL | Age: 80
DRG: 314 | End: 2025-06-05
Payer: MEDICARE

## 2025-06-05 ENCOUNTER — APPOINTMENT (OUTPATIENT)
Dept: VASCULAR SURGERY | Facility: HOSPITAL | Age: 80
DRG: 314 | End: 2025-06-05
Attending: HOSPITALIST
Payer: MEDICARE

## 2025-06-05 DIAGNOSIS — I45.10 RBBB: ICD-10-CM

## 2025-06-05 DIAGNOSIS — I46.9 CARDIAC ARREST (HCC): ICD-10-CM

## 2025-06-05 DIAGNOSIS — N18.6 END STAGE RENAL DISEASE (HCC): ICD-10-CM

## 2025-06-05 DIAGNOSIS — R55 SYNCOPE, UNSPECIFIED SYNCOPE TYPE: Primary | ICD-10-CM

## 2025-06-05 DIAGNOSIS — G93.40 ACUTE ENCEPHALOPATHY: ICD-10-CM

## 2025-06-05 PROBLEM — R41.82 ALTERED MENTAL STATUS: Status: ACTIVE | Noted: 2025-06-05

## 2025-06-05 LAB
ALBUMIN SERPL-MCNC: 3.2 G/DL (ref 3.5–5)
ALBUMIN/GLOB SERPL: 0.7 (ref 1.1–2.2)
ALP SERPL-CCNC: 49 U/L (ref 45–117)
ALT SERPL-CCNC: <6 U/L (ref 12–78)
ANION GAP BLD CALC-SCNC: ABNORMAL (ref 10–20)
ANION GAP SERPL CALC-SCNC: 7 MMOL/L (ref 2–12)
AST SERPL-CCNC: 27 U/L (ref 15–37)
BASE EXCESS BLD CALC-SCNC: 1.5 MMOL/L
BASOPHILS # BLD: 0.04 K/UL (ref 0–0.1)
BASOPHILS NFR BLD: 0.4 % (ref 0–1)
BILIRUB SERPL-MCNC: 0.5 MG/DL (ref 0.2–1)
BUN SERPL-MCNC: 41 MG/DL (ref 6–20)
BUN/CREAT SERPL: 5 (ref 12–20)
CA-I BLD-MCNC: 1.04 MMOL/L (ref 1.15–1.33)
CALCIUM SERPL-MCNC: 8.8 MG/DL (ref 8.5–10.1)
CHLORIDE BLD-SCNC: 101 MMOL/L (ref 100–111)
CHLORIDE SERPL-SCNC: 99 MMOL/L (ref 97–108)
CO2 SERPL-SCNC: 29 MMOL/L (ref 21–32)
COMMENT:: NORMAL
CREAT SERPL-MCNC: 7.54 MG/DL (ref 0.55–1.02)
DIFFERENTIAL METHOD BLD: ABNORMAL
EOSINOPHIL # BLD: 0.16 K/UL (ref 0–0.4)
EOSINOPHIL NFR BLD: 1.5 % (ref 0–7)
ERYTHROCYTE [DISTWIDTH] IN BLOOD BY AUTOMATED COUNT: 14.7 % (ref 11.5–14.5)
GLOBULIN SER CALC-MCNC: 4.4 G/DL (ref 2–4)
GLUCOSE BLD STRIP.AUTO-MCNC: 142 MG/DL (ref 65–117)
GLUCOSE SERPL-MCNC: 178 MG/DL (ref 65–100)
HCO3 BLDA-SCNC: 27 MMOL/L
HCT VFR BLD AUTO: 32.8 % (ref 35–47)
HGB BLD-MCNC: 10.4 G/DL (ref 11.5–16)
IMM GRANULOCYTES # BLD AUTO: 0.03 K/UL (ref 0–0.04)
IMM GRANULOCYTES NFR BLD AUTO: 0.3 % (ref 0–0.5)
LYMPHOCYTES # BLD: 0.75 K/UL (ref 0.8–3.5)
LYMPHOCYTES NFR BLD: 6.9 % (ref 12–49)
MCH RBC QN AUTO: 28.6 PG (ref 26–34)
MCHC RBC AUTO-ENTMCNC: 31.7 G/DL (ref 30–36.5)
MCV RBC AUTO: 90.1 FL (ref 80–99)
MONOCYTES # BLD: 1.6 K/UL (ref 0–1)
MONOCYTES NFR BLD: 14.8 % (ref 5–13)
NEUTS SEG # BLD: 8.22 K/UL (ref 1.8–8)
NEUTS SEG NFR BLD: 76.1 % (ref 32–75)
NRBC # BLD: 0 K/UL (ref 0–0.01)
NRBC BLD-RTO: 0 PER 100 WBC
NT PRO BNP: ABNORMAL PG/ML
PCO2 BLDV: 47.7 MMHG (ref 41–51)
PH BLDV: 7.37 (ref 7.32–7.42)
PLATELET # BLD AUTO: 263 K/UL (ref 150–400)
PMV BLD AUTO: 9.8 FL (ref 8.9–12.9)
PO2 BLDV: 40 MMHG (ref 25–40)
POTASSIUM BLD-SCNC: 4.3 MMOL/L (ref 3.5–5.5)
POTASSIUM SERPL-SCNC: 4.7 MMOL/L (ref 3.5–5.1)
PROT SERPL-MCNC: 7.6 G/DL (ref 6.4–8.2)
RBC # BLD AUTO: 3.64 M/UL (ref 3.8–5.2)
RBC MORPH BLD: ABNORMAL
SAO2 % BLD: 73 % (ref 94–98)
SERVICE CMNT-IMP: ABNORMAL
SODIUM BLD-SCNC: 134 MMOL/L (ref 136–145)
SODIUM SERPL-SCNC: 135 MMOL/L (ref 136–145)
SPECIMEN HOLD: NORMAL
SPECIMEN SITE: ABNORMAL
TROPONIN I SERPL HS-MCNC: 37 NG/L (ref 0–51)
TROPONIN I SERPL HS-MCNC: 40 NG/L (ref 0–51)
TROPONIN I SERPL HS-MCNC: 41 NG/L (ref 0–51)
WBC # BLD AUTO: 10.8 K/UL (ref 3.6–11)

## 2025-06-05 PROCEDURE — 84132 ASSAY OF SERUM POTASSIUM: CPT

## 2025-06-05 PROCEDURE — 70450 CT HEAD/BRAIN W/O DYE: CPT

## 2025-06-05 PROCEDURE — 84484 ASSAY OF TROPONIN QUANT: CPT

## 2025-06-05 PROCEDURE — 71045 X-RAY EXAM CHEST 1 VIEW: CPT

## 2025-06-05 PROCEDURE — 6360000002 HC RX W HCPCS: Performed by: HOSPITALIST

## 2025-06-05 PROCEDURE — 93971 EXTREMITY STUDY: CPT

## 2025-06-05 PROCEDURE — 82947 ASSAY GLUCOSE BLOOD QUANT: CPT

## 2025-06-05 PROCEDURE — 82803 BLOOD GASES ANY COMBINATION: CPT

## 2025-06-05 PROCEDURE — G0378 HOSPITAL OBSERVATION PER HR: HCPCS

## 2025-06-05 PROCEDURE — 36415 COLL VENOUS BLD VENIPUNCTURE: CPT

## 2025-06-05 PROCEDURE — 82962 GLUCOSE BLOOD TEST: CPT

## 2025-06-05 PROCEDURE — 6370000000 HC RX 637 (ALT 250 FOR IP): Performed by: HOSPITALIST

## 2025-06-05 PROCEDURE — 2500000003 HC RX 250 WO HCPCS: Performed by: HOSPITALIST

## 2025-06-05 PROCEDURE — 82330 ASSAY OF CALCIUM: CPT

## 2025-06-05 PROCEDURE — 85025 COMPLETE CBC W/AUTO DIFF WBC: CPT

## 2025-06-05 PROCEDURE — 94761 N-INVAS EAR/PLS OXIMETRY MLT: CPT

## 2025-06-05 PROCEDURE — 84295 ASSAY OF SERUM SODIUM: CPT

## 2025-06-05 PROCEDURE — 83880 ASSAY OF NATRIURETIC PEPTIDE: CPT

## 2025-06-05 PROCEDURE — 99285 EMERGENCY DEPT VISIT HI MDM: CPT

## 2025-06-05 PROCEDURE — 93005 ELECTROCARDIOGRAM TRACING: CPT

## 2025-06-05 PROCEDURE — 80053 COMPREHEN METABOLIC PANEL: CPT

## 2025-06-05 RX ORDER — INSULIN LISPRO 100 [IU]/ML
0-8 INJECTION, SOLUTION INTRAVENOUS; SUBCUTANEOUS
Status: DISCONTINUED | OUTPATIENT
Start: 2025-06-05 | End: 2025-06-08

## 2025-06-05 RX ORDER — ACETAMINOPHEN 325 MG/1
650 TABLET ORAL EVERY 6 HOURS PRN
Status: DISCONTINUED | OUTPATIENT
Start: 2025-06-05 | End: 2025-06-19 | Stop reason: HOSPADM

## 2025-06-05 RX ORDER — HEPARIN SODIUM 5000 [USP'U]/ML
5000 INJECTION, SOLUTION INTRAVENOUS; SUBCUTANEOUS EVERY 8 HOURS SCHEDULED
Status: DISCONTINUED | OUTPATIENT
Start: 2025-06-05 | End: 2025-06-05

## 2025-06-05 RX ORDER — FERROUS SULFATE 325(65) MG
325 TABLET ORAL
Status: DISCONTINUED | OUTPATIENT
Start: 2025-06-05 | End: 2025-06-19 | Stop reason: HOSPADM

## 2025-06-05 RX ORDER — INSULIN GLARGINE 100 [IU]/ML
20 INJECTION, SOLUTION SUBCUTANEOUS NIGHTLY
Status: DISCONTINUED | OUTPATIENT
Start: 2025-06-05 | End: 2025-06-09

## 2025-06-05 RX ORDER — SODIUM CHLORIDE 0.9 % (FLUSH) 0.9 %
5-40 SYRINGE (ML) INJECTION EVERY 12 HOURS SCHEDULED
Status: DISCONTINUED | OUTPATIENT
Start: 2025-06-05 | End: 2025-06-19 | Stop reason: HOSPADM

## 2025-06-05 RX ORDER — GABAPENTIN 100 MG/1
100 CAPSULE ORAL 3 TIMES DAILY
Status: DISCONTINUED | OUTPATIENT
Start: 2025-06-05 | End: 2025-06-19 | Stop reason: HOSPADM

## 2025-06-05 RX ORDER — DOCUSATE SODIUM 100 MG/1
100 CAPSULE, LIQUID FILLED ORAL EVERY OTHER DAY
Status: DISCONTINUED | OUTPATIENT
Start: 2025-06-06 | End: 2025-06-19 | Stop reason: HOSPADM

## 2025-06-05 RX ORDER — AMLODIPINE BESYLATE 5 MG/1
10 TABLET ORAL DAILY
Status: DISCONTINUED | OUTPATIENT
Start: 2025-06-05 | End: 2025-06-05

## 2025-06-05 RX ORDER — ONDANSETRON 2 MG/ML
4 INJECTION INTRAMUSCULAR; INTRAVENOUS EVERY 6 HOURS PRN
Status: DISCONTINUED | OUTPATIENT
Start: 2025-06-05 | End: 2025-06-19 | Stop reason: HOSPADM

## 2025-06-05 RX ORDER — ACETAMINOPHEN 650 MG/1
650 SUPPOSITORY RECTAL EVERY 6 HOURS PRN
Status: DISCONTINUED | OUTPATIENT
Start: 2025-06-05 | End: 2025-06-19 | Stop reason: HOSPADM

## 2025-06-05 RX ORDER — LOSARTAN POTASSIUM 50 MG/1
50 TABLET ORAL DAILY
Status: DISCONTINUED | OUTPATIENT
Start: 2025-06-06 | End: 2025-06-05

## 2025-06-05 RX ORDER — POLYETHYLENE GLYCOL 3350 17 G/17G
17 POWDER, FOR SOLUTION ORAL DAILY PRN
Status: DISCONTINUED | OUTPATIENT
Start: 2025-06-05 | End: 2025-06-19 | Stop reason: HOSPADM

## 2025-06-05 RX ORDER — ONDANSETRON 4 MG/1
4 TABLET, ORALLY DISINTEGRATING ORAL EVERY 8 HOURS PRN
Status: DISCONTINUED | OUTPATIENT
Start: 2025-06-05 | End: 2025-06-19 | Stop reason: HOSPADM

## 2025-06-05 RX ORDER — SODIUM CHLORIDE 0.9 % (FLUSH) 0.9 %
5-40 SYRINGE (ML) INJECTION PRN
Status: DISCONTINUED | OUTPATIENT
Start: 2025-06-05 | End: 2025-06-19 | Stop reason: HOSPADM

## 2025-06-05 RX ORDER — CARVEDILOL 12.5 MG/1
25 TABLET ORAL 2 TIMES DAILY
Status: DISCONTINUED | OUTPATIENT
Start: 2025-06-05 | End: 2025-06-08

## 2025-06-05 RX ORDER — FUROSEMIDE 10 MG/ML
40 INJECTION INTRAMUSCULAR; INTRAVENOUS ONCE
Status: COMPLETED | OUTPATIENT
Start: 2025-06-05 | End: 2025-06-05

## 2025-06-05 RX ORDER — CARVEDILOL 12.5 MG/1
12.5 TABLET ORAL 2 TIMES DAILY
Status: DISCONTINUED | OUTPATIENT
Start: 2025-06-05 | End: 2025-06-05

## 2025-06-05 RX ORDER — ROSUVASTATIN CALCIUM 10 MG/1
5 TABLET, COATED ORAL NIGHTLY
Status: DISCONTINUED | OUTPATIENT
Start: 2025-06-05 | End: 2025-06-19 | Stop reason: HOSPADM

## 2025-06-05 RX ORDER — LOSARTAN POTASSIUM 25 MG/1
25 TABLET ORAL DAILY
Status: DISCONTINUED | OUTPATIENT
Start: 2025-06-06 | End: 2025-06-19 | Stop reason: HOSPADM

## 2025-06-05 RX ORDER — SODIUM CHLORIDE 9 MG/ML
INJECTION, SOLUTION INTRAVENOUS PRN
Status: DISCONTINUED | OUTPATIENT
Start: 2025-06-05 | End: 2025-06-19 | Stop reason: HOSPADM

## 2025-06-05 RX ORDER — DULOXETIN HYDROCHLORIDE 30 MG/1
60 CAPSULE, DELAYED RELEASE ORAL 2 TIMES DAILY
Status: DISCONTINUED | OUTPATIENT
Start: 2025-06-06 | End: 2025-06-12

## 2025-06-05 RX ORDER — SEVELAMER CARBONATE 800 MG/1
800 TABLET, FILM COATED ORAL
Status: DISCONTINUED | OUTPATIENT
Start: 2025-06-05 | End: 2025-06-17

## 2025-06-05 RX ADMIN — GABAPENTIN 100 MG: 100 CAPSULE ORAL at 20:52

## 2025-06-05 RX ADMIN — INSULIN GLARGINE 20 UNITS: 100 INJECTION, SOLUTION SUBCUTANEOUS at 20:52

## 2025-06-05 RX ADMIN — APIXABAN 5 MG: 5 TABLET, FILM COATED ORAL at 20:52

## 2025-06-05 RX ADMIN — SODIUM CHLORIDE, PRESERVATIVE FREE 10 ML: 5 INJECTION INTRAVENOUS at 18:28

## 2025-06-05 RX ADMIN — FERROUS SULFATE TAB 325 MG (65 MG ELEMENTAL FE) 325 MG: 325 (65 FE) TAB at 20:52

## 2025-06-05 RX ADMIN — FUROSEMIDE 40 MG: 10 INJECTION, SOLUTION INTRAMUSCULAR; INTRAVENOUS at 18:23

## 2025-06-05 RX ADMIN — SEVELAMER CARBONATE 800 MG: 800 TABLET, FILM COATED ORAL at 18:21

## 2025-06-05 RX ADMIN — CARVEDILOL 25 MG: 12.5 TABLET, FILM COATED ORAL at 20:52

## 2025-06-05 RX ADMIN — SODIUM CHLORIDE, PRESERVATIVE FREE 10 ML: 5 INJECTION INTRAVENOUS at 20:54

## 2025-06-05 ASSESSMENT — PAIN DESCRIPTION - PAIN TYPE: TYPE: CHRONIC PAIN

## 2025-06-05 ASSESSMENT — PAIN - FUNCTIONAL ASSESSMENT: PAIN_FUNCTIONAL_ASSESSMENT: NONE - DENIES PAIN

## 2025-06-05 ASSESSMENT — PAIN SCALES - GENERAL
PAINLEVEL_OUTOF10: 8
PAINLEVEL_OUTOF10: 9

## 2025-06-05 ASSESSMENT — PAIN DESCRIPTION - ORIENTATION
ORIENTATION: LEFT;RIGHT
ORIENTATION: RIGHT

## 2025-06-05 ASSESSMENT — PAIN DESCRIPTION - LOCATION
LOCATION: KNEE
LOCATION: ARM

## 2025-06-05 ASSESSMENT — PAIN DESCRIPTION - DESCRIPTORS: DESCRIPTORS: ACHING

## 2025-06-05 NOTE — ED NOTES
TRANSFER - OUT REPORT:    Verbal report given to Collin BARGER on Malini Griggs  being transferred to [facility] for routine progression of patient care       Report consisted of patient's Situation, Background, Assessment and   Recommendations(SBAR).     Information from the following report(s) Nurse Handoff Report, ED Encounter Summary, ED SBAR, Adult Overview, Intake/Output, MAR, Recent Results, Neuro Assessment, and Event Log was reviewed with the receiving nurse.    [place] Fall Assessment:    Presents to emergency department  because of falls (Syncope, seizure, or loss of consciousness): Yes  Age > 70: Yes  Altered Mental Status, Intoxication with alcohol or substance confusion (Disorientation, impaired judgment, poor safety awaremess, or inability to follow instructions): Yes  Impaired Mobility: Ambulates or transfers with assistive devices or assistance; Unable to ambulate or transer.: Yes  Nursing Judgement: Yes          Lines:   Peripheral IV 06/05/25 Left;Anterior Forearm (Active)        Opportunity for questions and clarification was provided.      Patient transported with:  Monitor and Tech

## 2025-06-05 NOTE — ED TRIAGE NOTES
Pt arrives to ED via EMS from dialysis where staff reported to EMS that the patient had a 20 second syncopal episode. EMS reports pt is very drowsy. Pt reports she did not use her CPAP machine last night and is unsure if she finished dialysis. Pt drowsy during triage but follows commands. Provider present during triage.

## 2025-06-05 NOTE — ED PROVIDER NOTES
[facility] [place] [facility]  EMERGENCY DEPARTMENT ENCOUNTER      Pt Name: Malini Griggs  MRN: [id number]  Birthdate 1945  Date of evaluation: 6/5/2025  Provider: Wayne Pennington MD    CHIEF COMPLAINT       Chief Complaint   Patient presents with    Altered Mental Status         HISTORY OF PRESENT ILLNESS   (Location/Symptom, Timing/Onset, Context/Setting, Quality, Duration, Modifying Factors, Severity)  Note limiting factors.   Malini Griggs is an 79-year-old female with history of ESRD on HD, diabetes mellitus type 2, KATHRYN,?  Seizure, CHF who presents to the hospital via EMS from dialysis clinic after losing consciousness during dialysis.    Patient reportedly lost consciousness for 20 minutes.  Patient and EMS do not know whether dialysis was completed.    Patient denies any changes to her medications.  Denies ingestion of any substances.  Patient reports that she usually uses a CPAP machine but did not use hers last night.  Patient also notes that she fell yesterday but did not have any injury, did not hit her head, did not lose consciousness.  Reports no pain today.  Reports feeling well, just tired and cold.  Denies fever, dysuria, abdominal pain, chest pain, shortness of breath, vision changes, confusion.    The history is provided by the patient and the EMS personnel.         Review of External Medical Records:     Nursing Notes were reviewed.    REVIEW OF SYSTEMS    (2-9 systems for level 4, 10 or more for level 5)     Review of Systems    Except as noted above the remainder of the review of systems was reviewed and negative.       PAST MEDICAL HISTORY     Past Medical History:   Diagnosis Date    Arthritis     CHF (congestive heart failure) (HCC)     Diabetes mellitus (HCC)     Heart murmur     Hemodialysis patient     Hyperlipidemia     Hypertension     Kidney failure     MRSA (methicillin resistant Staphylococcus aureus)     Prolonged emergence from general anesthesia

## 2025-06-05 NOTE — ED NOTES
MD Grier attempted to call patients emergency contact and home phone number, no answer on either number.     Emergency contact on Matthew burroughs, Earlene, called, no answer with voice mailbox full

## 2025-06-06 ENCOUNTER — APPOINTMENT (OUTPATIENT)
Facility: HOSPITAL | Age: 80
DRG: 314 | End: 2025-06-06
Attending: INTERNAL MEDICINE
Payer: MEDICARE

## 2025-06-06 ENCOUNTER — APPOINTMENT (OUTPATIENT)
Dept: VASCULAR SURGERY | Facility: HOSPITAL | Age: 80
DRG: 314 | End: 2025-06-06
Attending: INTERNAL MEDICINE
Payer: MEDICARE

## 2025-06-06 LAB
ALBUMIN SERPL-MCNC: 2.7 G/DL (ref 3.5–5)
ALBUMIN/GLOB SERPL: 0.7 (ref 1.1–2.2)
ALP SERPL-CCNC: 42 U/L (ref 45–117)
ALT SERPL-CCNC: <6 U/L (ref 12–78)
AMMONIA PLAS-SCNC: <10 UMOL/L
ANION GAP SERPL CALC-SCNC: 8 MMOL/L (ref 2–12)
AST SERPL-CCNC: 14 U/L (ref 15–37)
BASOPHILS # BLD: 0.05 K/UL (ref 0–0.1)
BASOPHILS NFR BLD: 0.6 % (ref 0–1)
BILIRUB SERPL-MCNC: 0.6 MG/DL (ref 0.2–1)
BUN SERPL-MCNC: 47 MG/DL (ref 6–20)
BUN/CREAT SERPL: 5 (ref 12–20)
CALCIUM SERPL-MCNC: 8.4 MG/DL (ref 8.5–10.1)
CHLORIDE SERPL-SCNC: 99 MMOL/L (ref 97–108)
CO2 SERPL-SCNC: 28 MMOL/L (ref 21–32)
CREAT SERPL-MCNC: 9.08 MG/DL (ref 0.55–1.02)
CRP SERPL-MCNC: 11.9 MG/DL (ref 0–0.3)
DIFFERENTIAL METHOD BLD: ABNORMAL
ECHO AO ARCH DIAM: 1.6 CM
ECHO AO ASC DIAM: 3 CM
ECHO AO ASCENDING AORTA INDEX: 1.5 CM/M2
ECHO AO ROOT DIAM: 2.7 CM
ECHO AO ROOT INDEX: 1.35 CM/M2
ECHO AV AREA PEAK VELOCITY: 0.9 CM2
ECHO AV AREA VTI: 1 CM2
ECHO AV AREA/BSA PEAK VELOCITY: 0.5 CM2/M2
ECHO AV AREA/BSA VTI: 0.5 CM2/M2
ECHO AV MEAN GRADIENT: 28 MMHG
ECHO AV MEAN VELOCITY: 2.6 M/S
ECHO AV PEAK GRADIENT: 44 MMHG
ECHO AV PEAK VELOCITY: 3.3 M/S
ECHO AV VELOCITY RATIO: 0.27
ECHO AV VTI: 65 CM
ECHO BSA: 2.12 M2
ECHO BSA: 2.12 M2
ECHO EST RA PRESSURE: 3 MMHG
ECHO LA DIAMETER INDEX: 2.2 CM/M2
ECHO LA DIAMETER: 4.4 CM
ECHO LA TO AORTIC ROOT RATIO: 1.63
ECHO LA VOL A-L A2C: 44 ML (ref 22–52)
ECHO LA VOL A-L A4C: 54 ML (ref 22–52)
ECHO LA VOL BP: 46 ML (ref 22–52)
ECHO LA VOL MOD A2C: 42 ML (ref 22–52)
ECHO LA VOL MOD A4C: 51 ML (ref 22–52)
ECHO LA VOL/BSA BIPLANE: 23 ML/M2 (ref 16–34)
ECHO LA VOLUME AREA LENGTH: 49 ML
ECHO LA VOLUME INDEX A-L A2C: 22 ML/M2 (ref 16–34)
ECHO LA VOLUME INDEX A-L A4C: 27 ML/M2 (ref 16–34)
ECHO LA VOLUME INDEX AREA LENGTH: 25 ML/M2 (ref 16–34)
ECHO LA VOLUME INDEX MOD A2C: 21 ML/M2 (ref 16–34)
ECHO LA VOLUME INDEX MOD A4C: 26 ML/M2 (ref 16–34)
ECHO LV E' LATERAL VELOCITY: 5.05 CM/S
ECHO LV E' SEPTAL VELOCITY: 3.77 CM/S
ECHO LV EDV A2C: 55 ML
ECHO LV EDV A4C: 66 ML
ECHO LV EDV BP: 61 ML (ref 56–104)
ECHO LV EDV INDEX A4C: 33 ML/M2
ECHO LV EDV INDEX BP: 31 ML/M2
ECHO LV EDV NDEX A2C: 28 ML/M2
ECHO LV EF PHYSICIAN: 65 %
ECHO LV EJECTION FRACTION A2C: 74 %
ECHO LV EJECTION FRACTION A4C: 76 %
ECHO LV EJECTION FRACTION BIPLANE: 73 % (ref 55–100)
ECHO LV ESV A2C: 15 ML
ECHO LV ESV A4C: 16 ML
ECHO LV ESV BP: 17 ML (ref 19–49)
ECHO LV ESV INDEX A2C: 8 ML/M2
ECHO LV ESV INDEX A4C: 8 ML/M2
ECHO LV ESV INDEX BP: 9 ML/M2
ECHO LV FRACTIONAL SHORTENING: 36 % (ref 28–44)
ECHO LV INTERNAL DIMENSION DIASTOLE INDEX: 1.4 CM/M2
ECHO LV INTERNAL DIMENSION DIASTOLIC: 2.8 CM (ref 3.9–5.3)
ECHO LV INTERNAL DIMENSION SYSTOLIC INDEX: 0.9 CM/M2
ECHO LV INTERNAL DIMENSION SYSTOLIC: 1.8 CM
ECHO LV IVSD: 1.6 CM (ref 0.6–0.9)
ECHO LV MASS 2D: 144.7 G (ref 67–162)
ECHO LV MASS INDEX 2D: 72.3 G/M2 (ref 43–95)
ECHO LV POSTERIOR WALL DIASTOLIC: 1.4 CM (ref 0.6–0.9)
ECHO LV RELATIVE WALL THICKNESS RATIO: 1
ECHO LVOT AREA: 3.1 CM2
ECHO LVOT AV VTI INDEX: 0.32
ECHO LVOT DIAM: 2 CM
ECHO LVOT MEAN GRADIENT: 2 MMHG
ECHO LVOT PEAK GRADIENT: 4 MMHG
ECHO LVOT PEAK VELOCITY: 0.9 M/S
ECHO LVOT STROKE VOLUME INDEX: 33 ML/M2
ECHO LVOT SV: 65.9 ML
ECHO LVOT VTI: 21 CM
ECHO MV A VELOCITY: 1.72 M/S
ECHO MV AREA VTI: 1.4 CM2
ECHO MV E DECELERATION TIME (DT): 164.3 MS
ECHO MV E VELOCITY: 1.57 M/S
ECHO MV E/A RATIO: 0.91
ECHO MV E/E' LATERAL: 31.09
ECHO MV E/E' RATIO (AVERAGED): 36.37
ECHO MV E/E' SEPTAL: 41.64
ECHO MV LVOT VTI INDEX: 2.24
ECHO MV MAX VELOCITY: 2.1 M/S
ECHO MV MEAN GRADIENT: 10 MMHG
ECHO MV MEAN VELOCITY: 1.6 M/S
ECHO MV PEAK GRADIENT: 18 MMHG
ECHO MV VTI: 47 CM
ECHO PV MAX VELOCITY: 1.6 M/S
ECHO PV PEAK GRADIENT: 10 MMHG
ECHO RIGHT VENTRICULAR SYSTOLIC PRESSURE (RVSP): 39 MMHG
ECHO RV FREE WALL PEAK S': 11 CM/S
ECHO RV INTERNAL DIMENSION: 2.9 CM
ECHO RV TAPSE: 1.5 CM (ref 1.7–?)
ECHO TV REGURGITANT MAX VELOCITY: 3.01 M/S
ECHO TV REGURGITANT PEAK GRADIENT: 37 MMHG
EKG ATRIAL RATE: 95 BPM
EKG DIAGNOSIS: NORMAL
EKG P AXIS: 74 DEGREES
EKG P-R INTERVAL: 136 MS
EKG Q-T INTERVAL: 396 MS
EKG QRS DURATION: 136 MS
EKG QTC CALCULATION (BAZETT): 497 MS
EKG R AXIS: 71 DEGREES
EKG T AXIS: 27 DEGREES
EKG VENTRICULAR RATE: 95 BPM
EOSINOPHIL # BLD: 0.31 K/UL (ref 0–0.4)
EOSINOPHIL NFR BLD: 3.9 % (ref 0–7)
ERYTHROCYTE [DISTWIDTH] IN BLOOD BY AUTOMATED COUNT: 14.8 % (ref 11.5–14.5)
FLUAV RNA SPEC QL NAA+PROBE: NOT DETECTED
FLUBV RNA SPEC QL NAA+PROBE: NOT DETECTED
GLOBULIN SER CALC-MCNC: 3.9 G/DL (ref 2–4)
GLUCOSE BLD STRIP.AUTO-MCNC: 122 MG/DL (ref 65–117)
GLUCOSE BLD STRIP.AUTO-MCNC: 152 MG/DL (ref 65–117)
GLUCOSE BLD STRIP.AUTO-MCNC: 154 MG/DL (ref 65–117)
GLUCOSE BLD STRIP.AUTO-MCNC: 219 MG/DL (ref 65–117)
GLUCOSE SERPL-MCNC: 142 MG/DL (ref 65–100)
HCT VFR BLD AUTO: 28.7 % (ref 35–47)
HGB BLD-MCNC: 9.1 G/DL (ref 11.5–16)
IMM GRANULOCYTES # BLD AUTO: 0.03 K/UL (ref 0–0.04)
IMM GRANULOCYTES NFR BLD AUTO: 0.4 % (ref 0–0.5)
LACTATE SERPL-SCNC: 1.4 MMOL/L (ref 0.4–2)
LYMPHOCYTES # BLD: 0.9 K/UL (ref 0.8–3.5)
LYMPHOCYTES NFR BLD: 11.2 % (ref 12–49)
MAGNESIUM SERPL-MCNC: 2 MG/DL (ref 1.6–2.4)
MCH RBC QN AUTO: 28.2 PG (ref 26–34)
MCHC RBC AUTO-ENTMCNC: 31.7 G/DL (ref 30–36.5)
MCV RBC AUTO: 88.9 FL (ref 80–99)
MONOCYTES # BLD: 1.4 K/UL (ref 0–1)
MONOCYTES NFR BLD: 17.4 % (ref 5–13)
NEUTS SEG # BLD: 5.36 K/UL (ref 1.8–8)
NEUTS SEG NFR BLD: 66.5 % (ref 32–75)
NRBC # BLD: 0 K/UL (ref 0–0.01)
NRBC BLD-RTO: 0 PER 100 WBC
PHOSPHATE SERPL-MCNC: 5 MG/DL (ref 2.6–4.7)
PLATELET # BLD AUTO: 248 K/UL (ref 150–400)
PMV BLD AUTO: 9.9 FL (ref 8.9–12.9)
POTASSIUM SERPL-SCNC: 5 MMOL/L (ref 3.5–5.1)
PROT SERPL-MCNC: 6.6 G/DL (ref 6.4–8.2)
RBC # BLD AUTO: 3.23 M/UL (ref 3.8–5.2)
SARS-COV-2 RNA RESP QL NAA+PROBE: NOT DETECTED
SERVICE CMNT-IMP: ABNORMAL
SODIUM SERPL-SCNC: 135 MMOL/L (ref 136–145)
SOURCE: NORMAL
TROPONIN I SERPL HS-MCNC: 36 NG/L (ref 0–51)
TSH SERPL DL<=0.05 MIU/L-ACNC: 1.45 UIU/ML (ref 0.36–3.74)
WBC # BLD AUTO: 8.1 K/UL (ref 3.6–11)

## 2025-06-06 PROCEDURE — 36415 COLL VENOUS BLD VENIPUNCTURE: CPT

## 2025-06-06 PROCEDURE — 93880 EXTRACRANIAL BILAT STUDY: CPT

## 2025-06-06 PROCEDURE — 97165 OT EVAL LOW COMPLEX 30 MIN: CPT

## 2025-06-06 PROCEDURE — 86140 C-REACTIVE PROTEIN: CPT

## 2025-06-06 PROCEDURE — 85025 COMPLETE CBC W/AUTO DIFF WBC: CPT

## 2025-06-06 PROCEDURE — 93010 ELECTROCARDIOGRAM REPORT: CPT | Performed by: INTERNAL MEDICINE

## 2025-06-06 PROCEDURE — 93306 TTE W/DOPPLER COMPLETE: CPT | Performed by: INTERNAL MEDICINE

## 2025-06-06 PROCEDURE — C8929 TTE W OR WO FOL WCON,DOPPLER: HCPCS

## 2025-06-06 PROCEDURE — 83605 ASSAY OF LACTIC ACID: CPT

## 2025-06-06 PROCEDURE — G0378 HOSPITAL OBSERVATION PER HR: HCPCS

## 2025-06-06 PROCEDURE — 97162 PT EVAL MOD COMPLEX 30 MIN: CPT

## 2025-06-06 PROCEDURE — 82962 GLUCOSE BLOOD TEST: CPT

## 2025-06-06 PROCEDURE — 84484 ASSAY OF TROPONIN QUANT: CPT

## 2025-06-06 PROCEDURE — 94660 CPAP INITIATION&MGMT: CPT

## 2025-06-06 PROCEDURE — 6370000000 HC RX 637 (ALT 250 FOR IP): Performed by: HOSPITALIST

## 2025-06-06 PROCEDURE — 97535 SELF CARE MNGMENT TRAINING: CPT

## 2025-06-06 PROCEDURE — 80053 COMPREHEN METABOLIC PANEL: CPT

## 2025-06-06 PROCEDURE — 82140 ASSAY OF AMMONIA: CPT

## 2025-06-06 PROCEDURE — 97530 THERAPEUTIC ACTIVITIES: CPT

## 2025-06-06 PROCEDURE — 6360000004 HC RX CONTRAST MEDICATION: Performed by: INTERNAL MEDICINE

## 2025-06-06 PROCEDURE — 87040 BLOOD CULTURE FOR BACTERIA: CPT

## 2025-06-06 PROCEDURE — 2500000003 HC RX 250 WO HCPCS: Performed by: HOSPITALIST

## 2025-06-06 PROCEDURE — 84443 ASSAY THYROID STIM HORMONE: CPT

## 2025-06-06 PROCEDURE — 83735 ASSAY OF MAGNESIUM: CPT

## 2025-06-06 PROCEDURE — 94761 N-INVAS EAR/PLS OXIMETRY MLT: CPT

## 2025-06-06 PROCEDURE — 84100 ASSAY OF PHOSPHORUS: CPT

## 2025-06-06 PROCEDURE — 87636 SARSCOV2 & INF A&B AMP PRB: CPT

## 2025-06-06 RX ADMIN — FERROUS SULFATE TAB 325 MG (65 MG ELEMENTAL FE) 325 MG: 325 (65 FE) TAB at 20:06

## 2025-06-06 RX ADMIN — SODIUM CHLORIDE, PRESERVATIVE FREE 10 ML: 5 INJECTION INTRAVENOUS at 09:20

## 2025-06-06 RX ADMIN — GABAPENTIN 100 MG: 100 CAPSULE ORAL at 09:19

## 2025-06-06 RX ADMIN — INSULIN GLARGINE 20 UNITS: 100 INJECTION, SOLUTION SUBCUTANEOUS at 20:05

## 2025-06-06 RX ADMIN — GABAPENTIN 100 MG: 100 CAPSULE ORAL at 20:06

## 2025-06-06 RX ADMIN — SULFUR HEXAFLUORIDE 2 ML: KIT at 09:56

## 2025-06-06 RX ADMIN — SEVELAMER CARBONATE 800 MG: 800 TABLET, FILM COATED ORAL at 13:02

## 2025-06-06 RX ADMIN — ACETAMINOPHEN 650 MG: 325 TABLET ORAL at 20:06

## 2025-06-06 RX ADMIN — DULOXETINE 60 MG: 30 CAPSULE, DELAYED RELEASE ORAL at 09:19

## 2025-06-06 RX ADMIN — DULOXETINE 60 MG: 30 CAPSULE, DELAYED RELEASE ORAL at 20:06

## 2025-06-06 RX ADMIN — ACETAMINOPHEN 650 MG: 325 TABLET ORAL at 02:14

## 2025-06-06 RX ADMIN — LEVOTHYROXINE SODIUM 75 MCG: 0.03 TABLET ORAL at 06:05

## 2025-06-06 RX ADMIN — INSULIN LISPRO 2 UNITS: 100 INJECTION, SOLUTION INTRAVENOUS; SUBCUTANEOUS at 17:57

## 2025-06-06 RX ADMIN — SEVELAMER CARBONATE 800 MG: 800 TABLET, FILM COATED ORAL at 17:58

## 2025-06-06 RX ADMIN — DOCUSATE SODIUM 100 MG: 100 CAPSULE, LIQUID FILLED ORAL at 09:19

## 2025-06-06 RX ADMIN — ROSUVASTATIN CALCIUM 5 MG: 10 TABLET, FILM COATED ORAL at 20:06

## 2025-06-06 RX ADMIN — GABAPENTIN 100 MG: 100 CAPSULE ORAL at 13:02

## 2025-06-06 RX ADMIN — CARVEDILOL 25 MG: 12.5 TABLET, FILM COATED ORAL at 20:06

## 2025-06-06 RX ADMIN — CARVEDILOL 25 MG: 12.5 TABLET, FILM COATED ORAL at 09:19

## 2025-06-06 RX ADMIN — SODIUM CHLORIDE, PRESERVATIVE FREE 10 ML: 5 INJECTION INTRAVENOUS at 20:07

## 2025-06-06 RX ADMIN — APIXABAN 5 MG: 5 TABLET, FILM COATED ORAL at 09:19

## 2025-06-06 RX ADMIN — SEVELAMER CARBONATE 800 MG: 800 TABLET, FILM COATED ORAL at 09:19

## 2025-06-06 RX ADMIN — APIXABAN 5 MG: 5 TABLET, FILM COATED ORAL at 20:06

## 2025-06-06 ASSESSMENT — PAIN DESCRIPTION - ORIENTATION
ORIENTATION: RIGHT
ORIENTATION: RIGHT

## 2025-06-06 ASSESSMENT — PAIN DESCRIPTION - PAIN TYPE: TYPE: ACUTE PAIN

## 2025-06-06 ASSESSMENT — PAIN SCALES - GENERAL
PAINLEVEL_OUTOF10: 8
PAINLEVEL_OUTOF10: 8

## 2025-06-06 ASSESSMENT — PAIN DESCRIPTION - DESCRIPTORS
DESCRIPTORS: THROBBING
DESCRIPTORS: THROBBING

## 2025-06-06 ASSESSMENT — PAIN DESCRIPTION - LOCATION
LOCATION: ARM
LOCATION: ARM

## 2025-06-06 ASSESSMENT — PAIN DESCRIPTION - FREQUENCY: FREQUENCY: CONTINUOUS

## 2025-06-06 NOTE — PROGRESS NOTES
Hospitalist Progress Note      NAME:  Malini Griggs   :  1945  MRM:  [id number]    Date/Time: 2025  1:36 PM           Assessment / Plan:     The patient is an 79-year-old female who has history of end-stage renal disease, type 2 diabetes, obstructive sleep apnea, congestive heart failure, admitted after becoming unconscious during dialysis for approx 20 seconds    # Altered mental status likely syncope   -CT head negative   -possibly extra somnolence may be due to hydrocodone and Benadryl taken prior to dialysis   -avoid narcotic medications.  - Not clear if she was hypotensive during Dialysis.   -BP soft today  -check orthostatics  -ECHO- EF 73  -carotid duplex ordered    End-stage renal disease.    -Chest xray with pulmonary edema   -BNP 10,213- lasix given  BUN47, creatinine 9.08- similar to baseline  -Nephrology consulted  - resume dialysis T/Th/Sat. Can complete HD outpatient tomorrow if DC'd    RUE edema.    -Pain and edema to right arm. Fistula placed . Sensation intact  -Ultrasound: \"No evidence of dvt in the right upper extremity.  Right HD access appears patent. Right radial, and ulnar arteries with antegrade flow.\"  -Cleared by vascular surgery  -follow up outpatient in 2 weeks for post op    Anemia, probably due to chronic kidney disease.  -hgb 9.1- monitor  -anemia panel ordered     Right bundle-branch block, which is new.  The patient is asymptomatic, can be followed up as an outpatient.    Hx diabetes.    -continue on sliding scale   -A1C ordered      #BMI (Calculated): 43.51    I have personally reviewed the radiographs, laboratory data in Epic and decisions and statements above are based partially on this personal interpretation.                 Care Plan discussed with: Patient    Discussed:  Care Plan    Prophylaxis:   Eliquis    Disposition:  Home w/Family, possibly HH- await PT recommedations           ___________________________________________________    Provider: Candy ORTEGA

## 2025-06-06 NOTE — DISCHARGE INSTRUCTIONS
HOSPITALIST DISCHARGE INSTRUCTIONS          NAME: Malini Griggs   :  1945   MRN:  [id number]     Date/Time:  2025 3:58 PM    ADMIT DATE: 2025     DISCHARGE DATE: 2025     ADMITTING DIAGNOSIS:  Altered mental status    DISCHARGE DIAGNOSIS:  Same as above    MEDICATIONS:  .AVSMEDLIST     It is important that you take the medication exactly as they are prescribed.   Keep your medication in the bottles provided by the pharmacist and keep a list of the medication names, dosages, and times to be taken in your wallet.   Do not take other medications without consulting your doctor     Pain Management: per above medications    What to do at Home    Recommended diet:  renal diet    Recommended activity: activity as tolerated    1) Return to the hospital if you feel worse    2) If you experience any of the following symptoms then please call your primary care physician or return to the emergency room if you cannot get hold of your doctor:  Fever, chills, nausea, vomiting, diarrhea, change in mentation, falling, bleeding, shortness of breath, chest pain, severe headache, severe abdominal pain,    3) You were seen in the hospital for altered mental status. It is believed that this was related to taking narcotics and benadryl. Avoid taking pain pills as it can impact your mental status.   While in the hospital, you were unsure which blood pressure medications you take. Please follow up with your primary doctor regarding this.  You were diagnosed with a right bundle branch block on your EKG. Follow up with your primary care doctor.  Lastly, there were concerns regarding the swelling in your right arm. Your ultrasound shows good blood flow to the arm. The vascular surgeon also evaluated you. Please follow up as previously scheduled for post op from the fistula placement. Continue your blood thinners.     Follow up with your doctors as directed    Follow Up:  Ira Banuelos  [place]

## 2025-06-06 NOTE — PLAN OF CARE
Problem: Safety - Adult  Goal: Free from fall injury  Outcome: Progressing     Problem: Chronic Conditions and Co-morbidities  Goal: Patient's chronic conditions and co-morbidity symptoms are monitored and maintained or improved  Outcome: Progressing  Flowsheets (Taken 6/5/2025 1945)  Care Plan - Patient's Chronic Conditions and Co-Morbidity Symptoms are Monitored and Maintained or Improved: Monitor and assess patient's chronic conditions and comorbid symptoms for stability, deterioration, or improvement     Problem: Discharge Planning  Goal: Discharge to home or other facility with appropriate resources  Outcome: Progressing  Flowsheets (Taken 6/5/2025 1945)  Discharge to home or other facility with appropriate resources: Arrange for needed discharge resources and transportation as appropriate

## 2025-06-06 NOTE — PLAN OF CARE
Problem: Occupational Therapy - Adult  Goal: By Discharge: Performs self-care activities at highest level of function for planned discharge setting.  See evaluation for individualized goals.  Description: FUNCTIONAL STATUS PRIOR TO ADMISSION:  Patient lives in one level senior apartment - grab bars in bathroom. Uses rollator for mobility,. Has w/c if needed   Receives Help From: Family, Prior Level of Assist for ADLs: Needs assistance (assistance with dressing),  ,  ,  ,  ,  ,  ,  , Prior Level of Assist for Transfers: Independent, Active : No       Occupational Therapy Goals:  Initiated 6/6/2025  1.  Patient will perform grooming in stand with Contact Guard Assist within 7 day(s).  2.  Patient will perform bathing with Minimal Assist within 7 day(s).  3.  Patient will perform upper body dressing with Minimal Assist within 7 day(s).  4.  Patient will perform toilet transfers with Stand by Assist  within 7 day(s).  5.  Patient will perform all aspects of toileting with Stand by Assist within 7 day(s).  6.  Patient will perform RUE HEP with stand by assist within 7 days.  Outcome: Progressing   OCCUPATIONAL THERAPY EVALUATION    Patient: Malini Griggs (79 y.o. female)  Date: 6/6/2025  Primary Diagnosis: End stage renal disease (HCC) [N18.6]  Altered mental status [R41.82]  RBBB [I45.10]  Syncope, unspecified syncope type [R55]         Precautions: General Precautions                  ASSESSMENT :  Patient requires minimal assist for bathing/dressing at PLOF. Currently, she requires up to max assist for bathing, dressing, toileting. She is largely impacted by RUE edema and pain.  The patient is limited by decreased functional mobility, independence in ADLs, high-level IADLs, ROM, strength, body mechanics, activity tolerance, endurance, balance, increased pain levels.    Based on the impairments listed above patient will benefit from acute OT services to maximize her Adl independence and safety.    Functional  Shower/Tub: Walk-in shower  Bathroom Equipment: Shower chair  Home Equipment: Wheelchair - Manual, Mechanical lift, Rollator  Has the patient had two or more falls in the past year or any fall with injury in the past year?: Yes (fell on Tuesday)  Receives Help From: Family  Prior Level of Assist for ADLs: Needs assistance (assistance with dressing)  Prior Level of Assist for Ambulation: Independent household ambulator, with or without device  Prior Level of Assist for Transfers: Independent  Active : No      Hand Dominance: right     EXAMINATION OF PERFORMANCE DEFICITS:    Cognitive/Behavioral Status:  Orientation  Overall Orientation Status: Within Normal Limits  Orientation Level: Oriented to person;Oriented to place;Oriented to situation;Oriented to time  Cognition  Cognition Comment: diminishes her own abilities    Vision/Perceptual:          Vision  Vision: Impaired  Vision Exceptions: Wears glasses at all times               Range of Motion:   AROM: Generally decreased, functional (exception: R UE)         Strength:  Strength: Generally decreased, functional (exception: R UE)      Coordination:  Coordination: Generally decreased, functional (exception: R UE)     Coordination: Within functional limits    Functional Mobility and Transfers for ADLs:    Bed Mobility:     Bed Mobility Training  Bed Mobility Training: Yes  Overall Level of Assistance: Substantial/Maximal assistance  Supine to Sit: Substantial/Maximal assistance  Scooting: Substantial/Maximal assistance    Transfers:      Transfer Training  Transfer Training: Yes  Sit to Stand: Stand by assistance  Stand to Sit: Stand by assistance  Bed to Chair: Stand by assistance;Contact guard assistance          Functional Mobility: Minimal assistance          Balance:      Balance  Sitting: Intact;Without support  Standing: Intact;With support      ADL Assessment:          Feeding: Setup       Grooming: Minimal assistance       UE Bathing: Moderate

## 2025-06-06 NOTE — H&P
[facility]          [address] [place]  [address]                           HISTORY & PHYSICAL      PATIENT NAME: BARRETT WEBER              : 1945  MED REC NO: [id number]                       ROOM: A394  ACCOUNT NO: [id number]                       ADMIT DATE: 2025  PROVIDER: Yodit Lopez MD    PRESENTING COMPLAINT:  Confusion.    HISTORY OF PRESENT ILLNESS:      The patient is an 79-year-old female who has history of end-stage renal disease, type 2 diabetes, obstructive sleep apnea, congestive heart failure, was sent from dialysis after becoming unconscious/syncope.  The details are not clear.  At this time, I called her granddaughter, who does not know much about what happened today.  At this time, the patient is awake and alert.  She denies having any chest pain, shortness of breath.  She tells me that she had a new fistula placed on her right arm on Monday by vascular surgery.  This morning she took hydrocodone, not clear how many, last night she took some Benadryl because she was itching.  She denies having any shortness of breath.  In the emergency room, her workup was negative.  It seems like her dialysis was not completed today.  Chest x-ray showed some mild interstitial pulmonary edema.    PAST MEDICAL HISTORY:    1. Congestive heart failure.  2. Diabetes.  3. End-stage renal disease, on dialysis.  4. History of hyperlipidemia.  5. History of hypothyroidism.    SOCIOECONOMIC HISTORY:  Patient does not smoke or drink.    CURRENT MEDICATIONS:  Not clear.  It looks like she is on Cymbalta, Lantus, Dulcolax, Flexeril, Lasix, gabapentin, Synthroid, Renagel, Colace, amlodipine, carvedilol, ferrous sulfate, and losartan, doses are not clear.    PHYSICAL EXAMINATION:    GENERAL:  The patient is an 79-year-old female, not in any acute distress.  Resting comfortably.  VITAL SIGNS:  Temperature 97.6, blood pressure 166/64, respiratory rate is 14,  pulse is 97, saturation 99%.  HEENT:  Reveals pupils equally reactive to light and accommodation.  NECK:  Supple.  There is no lymphadenopathy or JVD.  CHEST:  Clear.  No wheezing.  No crackles.  CARDIOVASCULAR SYSTEM:  S1 and S2 regular.  No murmur.  No S3.    ABDOMEN:  Reveals no tenderness.  No guarding.  No rigidity.  CNS:  Nonfocal.  The patient is alert, oriented, has normal strength.  Normal reflexes.  Plantars downgoing.    EXTREMITIES:  Reveals right arm is slightly swollen and tender to touch,    LABORATORY DATA:  White count 10.8, hemoglobin is 10.4, hematocrit 32.8, platelet count is 263,000.    Troponin high sensitivity is 37.    Sodium 135, potassium 4.7, chloride is 99, bicarb is 29, gap of 7, glucose 178, BUN is 41, creatinine 7.54, calcium is 8.8, bilirubin 0.5, ALT is less than 6, AST is 27, alkaline phosphatase 49, protein 7.6, albumin is 3.2.    ProBNP is 10,213.    Chest x-ray reveals cardiomegaly and mild interstitial pulmonary edema.    CT head shows no acute finding.    EKG showed normal sinus rhythm, right bundle-branch block.  Right bundle branch block is new.    ASSESSMENT AND PLAN:    The patient is an 79-year-old female with above medical history, is admitted for observation:    1. Altered mental status/syncope per history provided.  History is not clear at this time.  The patient is back to normal mentation.  Will be admitted for observation.  Extra somnolence may be due to hydrocodone and Benadryl, which she took this morning.  We will avoid narcotic medications.Not clear if she was hypotensive during Dialysis. Monitor BP.  2. End-stage renal disease.  We will consult Nephrology to complete dialysis. One dose of IV Lasix ordered as imaging showed Fluid overload .  3. Right arm is swollen.  We will get ultrasound and consult Vascular Surgery to evaluate right arm pain.  The patient has a new fistula placed on Monday.She is already on Eliquis.  4. Anemia, probably due to chronic kidney

## 2025-06-06 NOTE — CARE COORDINATION
Case Management Note            SHARLA has called pt's emergency contact on file [facility] [phone]. Phone number is not in service. No answer from all other numbers on file. SHARLA unable to complete bedside evaluation at this time. CM following.             ___________________________________________   Fallon Galvan RN Case Manager  6/6/2025   4:19 PM

## 2025-06-06 NOTE — DISCHARGE SUMMARY
[facility]  [address] [place], [place] [id number] [phone]          Hospitalist Discharge Summary     Patient ID:  Malini Griggs  [id number]  79 y.o.  1945    Admit date: 6/5/2025    Discharge date and time: 6/19/2025 2:59 PM    Admission Diagnoses: End stage renal disease (HCC) [N18.6]  Altered mental status [R41.82]  RBBB [I45.10]  Syncope, unspecified syncope type [R55]  SIRS (systemic inflammatory response syndrome) ([facility]) [R65.10]    Discharge Diagnoses:  Principal Diagnosis Altered mental status                                            Principal Problem:    Altered mental status  Active Problems:    SIRS (systemic inflammatory response syndrome) ([facility])    Acute encephalopathy  Resolved Problems:    RBBB    Infection of AV graft for dialysis    Leukocytosis    Syncope    Palliative care encounter    Cardiac arrest ([facility])    DNR (do not resuscitate) discussion         Hospital Course:   79-year-old female with history significant for hypertension, diabetes, end-stage renal disease, recent AV fistula,admitted with syncope, hypotension, concern for infected RUE fistula and course complicated by delirium and worsening encephalopathy.      6/16: Patient was having worsening delirium and received Zyprexa overnight.  In the morning, she was noted to be altered.  Later, she had a brief period of being agitated.  No further sedatives were given, however, during agitation she was noted to have progressive decline in mental status.  Later, she became fully altered with progressive bradycardia and hypotension.  There was concern for loss of falls so CODE BLUE was called.  Patient had CPR for 4 cycles receiving 3 epi, 1 bicarb.  ABG was obtained and ROSC was obtained.  Following obtaining ROSC, patient was noted to spontaneously have some muscular movements, however, she was very hypertensive.  Sedation was started.  Immediate central line access was obtained.     6/17: stable

## 2025-06-06 NOTE — PLAN OF CARE
Problem: Physical Therapy - Adult  Goal: By Discharge: Performs mobility at highest level of function for planned discharge setting.  See evaluation for individualized goals.  Description: FUNCTIONAL STATUS PRIOR TO ADMISSION: Patient was modified independent using a rollator for functional mobility. Also has a manual w/c for community mobility.     HOME SUPPORT PRIOR TO ADMISSION: The patient lived with her granddaughter who has been assisting with dressing and bed mobility      Physical Therapy Goals  Initiated 6/6/2025  1.  Patient will move from supine to sit and sit to supine, scoot up and down, and roll side to side in bed with minimal assistance within 7 day(s).    2.  Patient will perform sit to stand with modified independence within 7 day(s).  3.  Patient will transfer from bed to chair and chair to bed with modified independence using the least restrictive device within 7 day(s).  4.  Patient will ambulate with modified independence for 75 feet with the least restrictive device within 7 day(s).     Outcome: Progressing   PHYSICAL THERAPY EVALUATION    Patient: Malini Griggs (79 y.o. female)  Date: 6/6/2025  Primary Diagnosis: End stage renal disease (HCC) [N18.6]  Altered mental status [R41.82]  RBBB [I45.10]  Syncope, unspecified syncope type [R55]       Precautions: Restrictions/Precautions  Restrictions/Precautions: General Precautions            ASSESSMENT :   DEFICITS/IMPAIRMENTS:   The patient is limited by decreased functional mobility, independence in ADLs, RUE ROM & strength, increased pain levels in the setting of hospital admission for AMS/syncope while at dialysis. Patient this afternoon undermines her abilities, particularly with her right upper extremity, though she demonstrates all joints able to minimally activate against gravity. She does require maxA for bed mobility to seated edge of bed at start of session, though efforts reduced. She transfers and ambulates up to 25ft with CGA to

## 2025-06-06 NOTE — PROGRESS NOTES
Pt has permanent dialysis catheter that was POA wrapped in gauze. Pt was asked to allow for a dressing change but pt refused. Pt was educated on reasoning of dressing change and responded with \"if I am going to be here longer than 1 day then I might consider letting you change it.\"

## 2025-06-06 NOTE — CONSULTS
[facility]    Malini Griggs  YOB: 1945          Assessment & Plan:     ESRD on HD TTS at [facility]  AMS  New RUE AVF with arm edema  DM2    Rec:  She ran 2.5 hours with 2.6 L UF yesterday and is comfortable on RA with Sats of 95. No acute indication HD today  HD tomorrow and TTS  FERNANDA with HD  If d/c, can go to [facility] for HD tomorrow       Subjective:   CC: ESRD  HPI: Pt had AMS on HD yesterday and tx stopped after 2.5 hour s and 2.6 L UF. Now alert and awake. She has a new RUE avf with edema and dialyzes via RIJ PC.    ROS: No sob/n/v  PMH: ESRD, DM2, HTN, Anemia, CHF  Current Facility-Administered Medications   Medication Dose Route Frequency    ferrous sulfate (IRON 325) tablet 325 mg  325 mg Oral QHS    sevelamer (RENVELA) tablet 800 mg  800 mg Oral TID WC    docusate sodium (COLACE) capsule 100 mg  100 mg Oral Every Other Day    sodium chloride flush 0.9 % injection 5-40 mL  5-40 mL IntraVENous 2 times per day    sodium chloride flush 0.9 % injection 5-40 mL  5-40 mL IntraVENous PRN    0.9 % sodium chloride infusion   IntraVENous PRN    ondansetron (ZOFRAN-ODT) disintegrating tablet 4 mg  4 mg Oral Q8H PRN    Or    ondansetron (ZOFRAN) injection 4 mg  4 mg IntraVENous Q6H PRN    polyethylene glycol (GLYCOLAX) packet 17 g  17 g Oral Daily PRN    acetaminophen (TYLENOL) tablet 650 mg  650 mg Oral Q6H PRN    Or    acetaminophen (TYLENOL) suppository 650 mg  650 mg Rectal Q6H PRN    insulin lispro (HUMALOG,ADMELOG) injection vial 0-8 Units  0-8 Units SubCUTAneous 4x Daily AC & HS    carvedilol (COREG) tablet 25 mg  25 mg Oral BID    DULoxetine (CYMBALTA) extended release capsule 60 mg  60 mg Oral BID    apixaban (ELIQUIS) tablet 5 mg  5 mg Oral BID    gabapentin (NEURONTIN) capsule 100 mg  100 mg Oral TID    insulin glargine (LANTUS) injection vial 20 Units  20 Units SubCUTAneous Nightly    levothyroxine (SYNTHROID) tablet 75 mcg  75 mcg Oral QAM

## 2025-06-06 NOTE — PROGRESS NOTES
Spiritual Health History and Assessment/Progress Note  [facility]    Loneliness/Social Isolation,  ,  ,      Name: Malini Griggs MRN: [id number]    Age: 79 y.o.     Sex: female   Language: English   Confucianism: Yarsani   Altered mental status     Date: 6/6/2025            Total Time Calculated: 39 min              Spiritual Assessment began in [facility] B4 MULTI[facility]SPECIALTY ORTHOPED[facility] 2            Encounter Overview/Reason: Loneliness/Social Isolation  Service Provided For: Patient    Sydnie, Belief, Meaning:   Patient identifies as spiritual, is connected with a sydnie tradition or spiritual practice, and has beliefs or practices that help with coping during difficult times  Family/Friends No family/friends present      Importance and Influence:  Patient has spiritual/personal beliefs that influence decisions regarding their health  Family/Friends No family/friends present    Community:  Patient feels well-supported. Support system includes: Children and Extended family  Family/Friends No family/friends present    Assessment and Plan of Care:   Reviewed chart prior to bedside visit with Ms. Griggs. She is resting and welcomes visit. She shares life review; she has 4 living children, she said, 1 son and 3 daughters. Ms Griggs has her grand daughter living with her, and grand daughter does attend a [employer] in [place]. She holds a [employer] sydnie but no Hoah[employer]ism affiliation at this time. She shared medical history and hopes of the future. She expressed that she believes that she will go straight home after this admission. She welcomes prayers and visits and expressed her gratitude.       Patient Interventions include: Facilitated expression of thoughts and feelings, Explored spiritual coping/struggle/distress, Engaged in theological reflection, Affirmed coping skills/support systems, and Facilitated life review and/ or legacy  Family/Friends Interventions include: No family/friends present    Patient Plan of Care:

## 2025-06-06 NOTE — PROGRESS NOTES
Patient seen and examined  Access with a palpable thrill  Post op swelling not obviously sinister  She is to follow up for post op visit in 1-2 weeks   Ok for discharge from surgical standpoint.

## 2025-06-07 PROBLEM — R65.10 SIRS (SYSTEMIC INFLAMMATORY RESPONSE SYNDROME) (HCC): Status: ACTIVE | Noted: 2025-06-07

## 2025-06-07 LAB
ANION GAP SERPL CALC-SCNC: 8 MMOL/L (ref 2–12)
BUN SERPL-MCNC: 53 MG/DL (ref 6–20)
BUN/CREAT SERPL: 5 (ref 12–20)
CALCIUM SERPL-MCNC: 8.1 MG/DL (ref 8.5–10.1)
CHLORIDE SERPL-SCNC: 97 MMOL/L (ref 97–108)
CO2 SERPL-SCNC: 25 MMOL/L (ref 21–32)
CREAT SERPL-MCNC: 10.3 MG/DL (ref 0.55–1.02)
ECHO BSA: 2.12 M2
ERYTHROCYTE [DISTWIDTH] IN BLOOD BY AUTOMATED COUNT: 14.9 % (ref 11.5–14.5)
EST. AVERAGE GLUCOSE BLD GHB EST-MCNC: 123 MG/DL
FERRITIN SERPL-MCNC: 86 NG/ML (ref 8–252)
GLUCOSE BLD STRIP.AUTO-MCNC: 108 MG/DL (ref 65–117)
GLUCOSE BLD STRIP.AUTO-MCNC: 128 MG/DL (ref 65–117)
GLUCOSE BLD STRIP.AUTO-MCNC: 141 MG/DL (ref 65–117)
GLUCOSE BLD STRIP.AUTO-MCNC: 183 MG/DL (ref 65–117)
GLUCOSE SERPL-MCNC: 129 MG/DL (ref 65–100)
HBA1C MFR BLD: 5.9 % (ref 4–5.6)
HCT VFR BLD AUTO: 27.3 % (ref 35–47)
HGB BLD-MCNC: 8.7 G/DL (ref 11.5–16)
IRON SATN MFR SERPL: 6 % (ref 20–50)
IRON SERPL-MCNC: 11 UG/DL (ref 35–150)
MAGNESIUM SERPL-MCNC: 2 MG/DL (ref 1.6–2.4)
MCH RBC QN AUTO: 28.1 PG (ref 26–34)
MCHC RBC AUTO-ENTMCNC: 31.9 G/DL (ref 30–36.5)
MCV RBC AUTO: 88.1 FL (ref 80–99)
NRBC # BLD: 0 K/UL (ref 0–0.01)
NRBC BLD-RTO: 0 PER 100 WBC
PHOSPHATE SERPL-MCNC: 4.6 MG/DL (ref 2.6–4.7)
PLATELET # BLD AUTO: 211 K/UL (ref 150–400)
PMV BLD AUTO: 9.6 FL (ref 8.9–12.9)
POTASSIUM SERPL-SCNC: 5.4 MMOL/L (ref 3.5–5.1)
RBC # BLD AUTO: 3.1 M/UL (ref 3.8–5.2)
SERVICE CMNT-IMP: ABNORMAL
SERVICE CMNT-IMP: NORMAL
SODIUM SERPL-SCNC: 130 MMOL/L (ref 136–145)
TIBC SERPL-MCNC: 190 UG/DL (ref 250–450)
VAS LEFT CCA DIST EDV: 0 CM/S
VAS LEFT CCA DIST PSV: 47.7 CM/S
VAS LEFT CCA PROX EDV: 20.3 CM/S
VAS LEFT CCA PROX PSV: 69.6 CM/S
VAS LEFT ECA EDV: 22.3 CM/S
VAS LEFT ECA PSV: 245.3 CM/S
VAS LEFT ICA PROX EDV: 6.3 CM/S
VAS LEFT ICA PROX PSV: 59.7 CM/S
VAS LEFT ICA/CCA PSV: 1.3 NO UNITS
VAS LEFT VERTEBRAL EDV: 14.5 CM/S
VAS LEFT VERTEBRAL PSV: 60.7 CM/S
VAS RIGHT CCA DIST EDV: 20.3 CM/S
VAS RIGHT CCA DIST PSV: 52.9 CM/S
VAS RIGHT CCA PROX EDV: 15.1 CM/S
VAS RIGHT CCA PROX PSV: 69.9 CM/S
VAS RIGHT ECA EDV: 0 CM/S
VAS RIGHT ECA PSV: 85.5 CM/S
VAS RIGHT ICA PROX EDV: 9.9 CM/S
VAS RIGHT ICA PROX PSV: 43.8 CM/S
VAS RIGHT ICA/CCA PSV: 0.8 NO UNITS
VAS RIGHT VERTEBRAL EDV: 0 CM/S
VAS RIGHT VERTEBRAL PSV: 44 CM/S
WBC # BLD AUTO: 8.7 K/UL (ref 3.6–11)

## 2025-06-07 PROCEDURE — 83550 IRON BINDING TEST: CPT

## 2025-06-07 PROCEDURE — 83540 ASSAY OF IRON: CPT

## 2025-06-07 PROCEDURE — 94761 N-INVAS EAR/PLS OXIMETRY MLT: CPT

## 2025-06-07 PROCEDURE — 2580000003 HC RX 258: Performed by: NURSE PRACTITIONER

## 2025-06-07 PROCEDURE — 2500000003 HC RX 250 WO HCPCS: Performed by: HOSPITALIST

## 2025-06-07 PROCEDURE — 85027 COMPLETE CBC AUTOMATED: CPT

## 2025-06-07 PROCEDURE — 1100000000 HC RM PRIVATE

## 2025-06-07 PROCEDURE — 82962 GLUCOSE BLOOD TEST: CPT

## 2025-06-07 PROCEDURE — 87070 CULTURE OTHR SPECIMN AEROBIC: CPT

## 2025-06-07 PROCEDURE — 87205 SMEAR GRAM STAIN: CPT

## 2025-06-07 PROCEDURE — 6360000002 HC RX W HCPCS: Performed by: INTERNAL MEDICINE

## 2025-06-07 PROCEDURE — 2580000003 HC RX 258

## 2025-06-07 PROCEDURE — 83735 ASSAY OF MAGNESIUM: CPT

## 2025-06-07 PROCEDURE — 5A1D70Z PERFORMANCE OF URINARY FILTRATION, INTERMITTENT, LESS THAN 6 HOURS PER DAY: ICD-10-PCS | Performed by: INTERNAL MEDICINE

## 2025-06-07 PROCEDURE — 80048 BASIC METABOLIC PNL TOTAL CA: CPT

## 2025-06-07 PROCEDURE — 82728 ASSAY OF FERRITIN: CPT

## 2025-06-07 PROCEDURE — G0378 HOSPITAL OBSERVATION PER HR: HCPCS

## 2025-06-07 PROCEDURE — 84100 ASSAY OF PHOSPHORUS: CPT

## 2025-06-07 PROCEDURE — 90935 HEMODIALYSIS ONE EVALUATION: CPT

## 2025-06-07 PROCEDURE — 6360000002 HC RX W HCPCS

## 2025-06-07 PROCEDURE — 2500000003 HC RX 250 WO HCPCS

## 2025-06-07 PROCEDURE — 83036 HEMOGLOBIN GLYCOSYLATED A1C: CPT

## 2025-06-07 PROCEDURE — 6370000000 HC RX 637 (ALT 250 FOR IP): Performed by: HOSPITALIST

## 2025-06-07 PROCEDURE — 6360000002 HC RX W HCPCS: Performed by: NURSE PRACTITIONER

## 2025-06-07 PROCEDURE — P9047 ALBUMIN (HUMAN), 25%, 50ML: HCPCS | Performed by: NURSE PRACTITIONER

## 2025-06-07 RX ORDER — METRONIDAZOLE 500 MG/100ML
500 INJECTION, SOLUTION INTRAVENOUS EVERY 8 HOURS
Status: DISCONTINUED | OUTPATIENT
Start: 2025-06-07 | End: 2025-06-07

## 2025-06-07 RX ORDER — ALBUMIN (HUMAN) 12.5 G/50ML
25 SOLUTION INTRAVENOUS ONCE
Status: COMPLETED | OUTPATIENT
Start: 2025-06-07 | End: 2025-06-07

## 2025-06-07 RX ORDER — 0.9 % SODIUM CHLORIDE 0.9 %
500 INTRAVENOUS SOLUTION INTRAVENOUS ONCE
Status: COMPLETED | OUTPATIENT
Start: 2025-06-07 | End: 2025-06-07

## 2025-06-07 RX ADMIN — INSULIN GLARGINE 20 UNITS: 100 INJECTION, SOLUTION SUBCUTANEOUS at 20:18

## 2025-06-07 RX ADMIN — ACETAMINOPHEN 650 MG: 325 TABLET ORAL at 11:05

## 2025-06-07 RX ADMIN — ACETAMINOPHEN 650 MG: 325 TABLET ORAL at 20:19

## 2025-06-07 RX ADMIN — VANCOMYCIN HYDROCHLORIDE 1000 MG: 1 INJECTION, POWDER, LYOPHILIZED, FOR SOLUTION INTRAVENOUS at 16:39

## 2025-06-07 RX ADMIN — SEVELAMER CARBONATE 800 MG: 800 TABLET, FILM COATED ORAL at 16:39

## 2025-06-07 RX ADMIN — GABAPENTIN 100 MG: 100 CAPSULE ORAL at 20:19

## 2025-06-07 RX ADMIN — Medication 2500 MG: at 02:32

## 2025-06-07 RX ADMIN — WATER 2000 MG: 1 INJECTION INTRAMUSCULAR; INTRAVENOUS; SUBCUTANEOUS at 02:13

## 2025-06-07 RX ADMIN — LEVOTHYROXINE SODIUM 75 MCG: 0.03 TABLET ORAL at 07:16

## 2025-06-07 RX ADMIN — INSULIN LISPRO 2 UNITS: 100 INJECTION, SOLUTION INTRAVENOUS; SUBCUTANEOUS at 20:18

## 2025-06-07 RX ADMIN — APIXABAN 5 MG: 5 TABLET, FILM COATED ORAL at 20:18

## 2025-06-07 RX ADMIN — EPOETIN ALFA-EPBX 3000 UNITS: 4000 INJECTION, SOLUTION INTRAVENOUS; SUBCUTANEOUS at 16:43

## 2025-06-07 RX ADMIN — SODIUM CHLORIDE 500 ML: 0.9 INJECTION, SOLUTION INTRAVENOUS at 14:32

## 2025-06-07 RX ADMIN — SODIUM CHLORIDE, PRESERVATIVE FREE 10 ML: 5 INJECTION INTRAVENOUS at 20:20

## 2025-06-07 RX ADMIN — DULOXETINE 60 MG: 30 CAPSULE, DELAYED RELEASE ORAL at 20:18

## 2025-06-07 RX ADMIN — ROSUVASTATIN CALCIUM 5 MG: 10 TABLET, FILM COATED ORAL at 20:19

## 2025-06-07 RX ADMIN — FERROUS SULFATE TAB 325 MG (65 MG ELEMENTAL FE) 325 MG: 325 (65 FE) TAB at 20:18

## 2025-06-07 RX ADMIN — ALBUMIN (HUMAN) 25 G: 0.25 INJECTION, SOLUTION INTRAVENOUS at 14:32

## 2025-06-07 RX ADMIN — GABAPENTIN 100 MG: 100 CAPSULE ORAL at 14:35

## 2025-06-07 ASSESSMENT — PAIN DESCRIPTION - ORIENTATION: ORIENTATION: RIGHT

## 2025-06-07 ASSESSMENT — PAIN SCALES - GENERAL: PAINLEVEL_OUTOF10: 6

## 2025-06-07 ASSESSMENT — PAIN DESCRIPTION - LOCATION: LOCATION: ARM

## 2025-06-07 NOTE — PROGRESS NOTES
[facility] Dosing Services: Antimicrobial Stewardship Daily Doc  Consult for antibiotic dosing of Vancomycin by MARIETTA Bourgeois NP  Indication: skin infection  Day of Therapy: 1 of 7    Ht Readings from Last 1 Encounters:   06/06/25 1.549 m (5' 1\")        Wt Readings from Last 1 Encounters:   06/06/25 104.3 kg (230 lb)      Vancomycin therapy:  Loading dose: Vancomycin 2500 mg x1 dose now/given  Maintenance dose: Vancomycin 1000 mg IV after every HD session  Dose calculated to approximate a           a. Target AUC/ROBERT of 400-600          b. Trough of 15-20 mcg/mL   Last level:  mcg/mL  Plan:   Dose administration notes:     Non-Kinetic Antimicrobial Dosing Regimen:   Current Regimen:    Recommendation:   Dose administration notes:     Other Antimicrobial   (not dosed by pharmacist) cefepime   Cultures    Serum Creatinine Lab Results   Component Value Date/Time    CREATININE 9.08 06/06/2025 06:05 AM          Creatinine Clearance Estimated Creatinine Clearance: 5 mL/min (A) (based on SCr of 9.08 mg/dL (H)).     Temp Temp: 98.8 °F (37.1 °C) (Axillary)       WBC Lab Results   Component Value Date/Time    WBC 8.1 06/06/2025 06:05 AM          Procalcitonin No results found for: \"PROCAL\"   For Antifungals, Metronidazole and Nafcillin: Lab Results   Component Value Date/Time    ALT <6 06/06/2025 06:05 AM    AST 14 06/06/2025 06:05 AM        Pharmacist: Talat Faust  [phone]

## 2025-06-07 NOTE — PLAN OF CARE
Problem: Safety - Adult  Goal: Free from fall injury  6/7/2025 0949 by Amber Petty RN  Outcome: Progressing  6/6/2025 2319 by Mansoor Huber RN  Outcome: Progressing  6/6/2025 2000 by Coral Lambert RN  Outcome: Progressing     Problem: Chronic Conditions and Co-morbidities  Goal: Patient's chronic conditions and co-morbidity symptoms are monitored and maintained or improved  6/7/2025 0949 by Amber Petty RN  Outcome: Progressing  6/6/2025 2319 by Mansoor Huber RN  Outcome: Progressing  6/6/2025 2000 by Coral Lambert RN  Outcome: Progressing     Problem: Discharge Planning  Goal: Discharge to home or other facility with appropriate resources  6/7/2025 0949 by Amber Petty RN  Outcome: Progressing  6/6/2025 2319 by Mansoor Huber RN  Outcome: Progressing  6/6/2025 2000 by Coral Lambert RN  Outcome: Progressing     Problem: Pain  Goal: Verbalizes/displays adequate comfort level or baseline comfort level  6/7/2025 0949 by Amber Petty RN  Outcome: Progressing  6/6/2025 2319 by Mansoor Huber RN  Outcome: Progressing  6/6/2025 2000 by Coral Lambert RN  Outcome: Progressing     Problem: Skin/Tissue Integrity  Goal: Skin integrity remains intact  Description: 1.  Monitor for areas of redness and/or skin breakdown2.  Assess vascular access sites hourly3.  Every 4-6 hours minimum:  Change oxygen saturation probe site4.  Every 4-6 hours:  If on nasal continuous positive airway pressure, respiratory therapy assess nares and determine need for appliance change or resting period  6/7/2025 0949 by Amber Petty RN  Outcome: Progressing  6/6/2025 2319 by Mansoor Huber RN  Outcome: Progressing  6/6/2025 2000 by Coral Lambert RN  Outcome: Progressing     Problem: ABCDS Injury Assessment  Goal: Absence of physical injury  6/7/2025 0949 by Amber Petty RN  Outcome: Progressing  6/6/2025 2319 by Mansoor Huber RN  Outcome: Progressing  6/6/2025 2000 by Coral Lambert,

## 2025-06-07 NOTE — PROGRESS NOTES
Rapid Called at 1418    Responded to RRT at 1418 for Hypotension    RRT for hypotension.  Upon arrival pt had been placed in trendelenburg position with improved blood pressure.  Pt recently returned from dialysis which could be possible cause.  Provider at bedside for assessment and orders.  500mL fluid bolus and albumin ordered.  Pt returning to normotensive.  RRT clear.    Provider at bedside: Yes  Interventions ordered: Other (Comment)  Sepsis Suspected: No  Transfer to Higher Level of Care: NO  Blood Glucose: NA     Vitals:    06/07/25 1430   BP: (!) 120/51   Pulse:    Resp:    Temp:    SpO2:         Rapid Ended at 1438  RRT RN assisted with transport to accepting unit No.    Miguelito Rowland RN

## 2025-06-07 NOTE — CARE COORDINATION
6/7/2025  3:43 PM  CM attempted assessment with pt. Pt oriented to self, but unable to confirm demographics. CM attempted p/c to pt's DTR, [facility]; however, the number is not in service. CM asked pt if she had an alternative number for [facility]; however, she could not remember.

## 2025-06-07 NOTE — SIGNIFICANT EVENT
Notified by nursing temp 100.9. Tx w PO APAP. SIRS present w HR of 95 documented as well.  Noted patient is here for altered mental status/syncope, right upper extremity edema with negative ultrasound.  Nursing does report erythema to RUE fistula site.  Noted patient has been evaluated by vascular surgery with no concerns.  Have asked nursing to upload image of right upper extremity for documentation purposes.  Will send blood cultures, lactate, COVID/flu, UA if patient not oliguric (noted ESRD on HD).    Is this patient to be included in the SEP-1 core measure? Yes SEP-1 CORE MEASURE DATA      Sepsis Criteria   Severe Sepsis Criteria   Septic Shock Criteria       Must meet 2:    [x]Temp >100.9 F (38.3 C) or < 96.8 F (36 C)  [x]HR > 90  []RR > 20  []WBC > 12 or < 4 or 10% bands    AND:    [x] Infection Confirmed or Suspected.     Must meet 1:    []Lactate > 2       or   []Signs of Organ Dysfunction:    - SBP < 90 or MAP < 65  -Creatinine > 2 or increased from baseline  -Urine Output < 0.5 ml/kg/hr  -Bilirubin > 2  -INR > 1.5 (not anticoagulated)  -Platelets < 100,000  -Acute Respiratory Failure as evidenced by new need for NIPPV or mechanical ventilation   Must meet 1:    []Lactate > 4        or   []SBP < 90 or MAP < 65 for at least two readings in the first hour after fluid bolus administration    []Vasopressors initiated (if hypotension persists after fluid resuscitation)   Patient Vitals for the past 6 hrs:   BP Temp Pulse Resp SpO2   06/06/25 1614 (!) 125/49 97.3 °F (36.3 °C) 95 16 95 %   06/06/25 1933 (!) 113/53 (!) 100.9 °F (38.3 °C) 90 12 94 %   06/06/25 2048 -- 99.1 °F (37.3 °C) 89 -- 94 %      Recent Labs     06/05/25  1509 06/06/25  0605   WBC 10.8 8.1   CREATININE 7.54* 9.08*   BILITOT 0.5 0.6    248          Fluid Resuscitation Rationale: less than 30mL/kg because of a history of ESRD or stage IV/V CKD. Initial lactate pending at time of writing, following result.    Repeat lactate level: ordered

## 2025-06-07 NOTE — PROGRESS NOTES
Called to evaluate RUE incsions. Seen on HD, pain doing about the same  Vitals stable  RUE warm and dry, palpable thrill  Diffuse edema, mild erythema around tunelled graft (not uncommon)  Incision without drainage. Mositure in the armpit    S/p R AVG  - Overall looks benign.  There is normal postop swelling and some mild erythema which is not uncommon after graft placement.  There is no purulent drainage, The axillary incision needs to be covered (lots of moisture).    - Findings can be entirely normal or represent evolving infection.  Imaging would not be helpful this early postop.  I would treat with a course of IV antibiotics for at least two weeks to err on the safe side.   - Dr. Ahmadi will check back first of the week if she is still here

## 2025-06-07 NOTE — PLAN OF CARE
Problem: Safety - Adult  Goal: Free from fall injury  6/6/2025 2319 by Mansoor Huber RN  Outcome: Progressing  6/6/2025 2000 by Coral Lambert RN  Outcome: Progressing     Problem: Chronic Conditions and Co-morbidities  Goal: Patient's chronic conditions and co-morbidity symptoms are monitored and maintained or improved  6/6/2025 2319 by Mansoor Huber RN  Outcome: Progressing  6/6/2025 2000 by Coral Lambert RN  Outcome: Progressing     Problem: Discharge Planning  Goal: Discharge to home or other facility with appropriate resources  6/6/2025 2319 by Mansoor Huber RN  Outcome: Progressing  6/6/2025 2000 by Coral Lambert RN  Outcome: Progressing     Problem: Pain  Goal: Verbalizes/displays adequate comfort level or baseline comfort level  6/6/2025 2319 by Mansoor Huber RN  Outcome: Progressing  6/6/2025 2000 by Coral Lambert RN  Outcome: Progressing

## 2025-06-07 NOTE — FLOWSHEET NOTE
PRE HD TREATMENT   06/07/25 0910   Treatment   Treatment Goal 3L   Observations & Evaluations   Level of Consciousness 0   Oriented X 4   Heart Rhythm Regular  (on remote telemetry monitoring)   O2 Device None (Room air)   Bilateral Breath Sounds Diminished   Skin Color   (appropriate for race)   Skin Condition/Temp Warm;Swollen/edematous  (Swollen R arm)   Abdomen Inspection Obese   Edema Right lower extremity;Left lower extremity   RUE Edema +2   RLE Edema Trace   LLE Edema Trace   Vital Signs   BP 97/77   Temp 98.4 °F (36.9 °C)   Pulse 67   Respirations 18   SpO2 97 %   Pain Assessment   Pain Assessment 0-10   Pain Level 6   Pain Location Arm   Pain Orientation Right   Technical Checks   Dialysis Machine No. [id number]   RO Machine Number [id number]   Dialyzer Lot No. [id number]   Tubing Lot Number [id number]   All Connections Secure Yes   NS Bag Yes   Saline Line Double Clamped Yes   Dialyzer Nipro ELISIO   Prime Volume (mL) 200 mL   ICEBOAT I;C;E;B;O;A;T   RO Machine Log Sheet Completed Yes   Machine Alarm Self Test Completed;Passed   Air Foam Detector Tested;Proper Function;pH Reading   Extracorporeal Circuit Tested for Integrity Yes   Machine Conductivity 13.7   Manual Ph 7.2   Bleach Test (Neg) Yes   Bath Temperature 98.6 °F (37 °C)   Treatment Initiation   Dialyze Hours 3.25   Treatment  Initiation Universal Precautions maintained;Lines secured to patient;Connections secured;Prime given;Venous Parameters set;Arterial Parameters set;Air foam detector engaged;[place] Device;Dialysate;Saline line double clamped;Dialyzer   Dialysis Bath   K+ (Potassium) 2   Ca+ (Calcium) 2.5   Na+ (Sodium) 138   HCO3 (Bicarb) 35   Bicarbonate Concentrate Lot No. [id number]   Acid Concentrate Lot No. [id number]   Handoff   Communication Given   (pre hd report)   Handoff Given To Nagi Cantrell   Handoff Received From SAVAGE Huber RN   Handoff Communication Telephone   Time Handoff Given 0700     Primary RN SBAR: SAVAGE Huber RN  Incapacitated

## 2025-06-07 NOTE — PLAN OF CARE
Problem: Safety - Adult  Goal: Free from fall injury  Outcome: Progressing     Problem: Chronic Conditions and Co-morbidities  Goal: Patient's chronic conditions and co-morbidity symptoms are monitored and maintained or improved  Outcome: Progressing     Problem: Discharge Planning  Goal: Discharge to home or other facility with appropriate resources  Outcome: Progressing     Problem: Pain  Goal: Verbalizes/displays adequate comfort level or baseline comfort level  Outcome: Progressing     Problem: Skin/Tissue Integrity  Goal: Skin integrity remains intact  Description: 1.  Monitor for areas of redness and/or skin breakdown2.  Assess vascular access sites hourly3.  Every 4-6 hours minimum:  Change oxygen saturation probe site4.  Every 4-6 hours:  If on nasal continuous positive airway pressure, respiratory therapy assess nares and determine need for appliance change or resting period  Outcome: Progressing     Problem: ABCDS Injury Assessment  Goal: Absence of physical injury  Outcome: Progressing     Problem: Physical Therapy - Adult  Goal: By Discharge: Performs mobility at highest level of function for planned discharge setting.  See evaluation for individualized goals.  Description: FUNCTIONAL STATUS PRIOR TO ADMISSION: Patient was modified independent using a rollator for functional mobility. Also has a manual w/c for community mobility.     HOME SUPPORT PRIOR TO ADMISSION: The patient lived with her granddaughter who has been assisting with dressing and bed mobility      Physical Therapy Goals  Initiated 6/6/2025  1.  Patient will move from supine to sit and sit to supine, scoot up and down, and roll side to side in bed with minimal assistance within 7 day(s).    2.  Patient will perform sit to stand with modified independence within 7 day(s).  3.  Patient will transfer from bed to chair and chair to bed with modified independence using the least restrictive device within 7 day(s).  4.  Patient will ambulate

## 2025-06-07 NOTE — PROGRESS NOTES
Hospitalist Progress Note      NAME:  Malini Griggs   :  1945  MRM:  [id number]    Date/Time: 2025  3:37 PM           Assessment / Plan:     The patient is an 79-year-old female who has history of end-stage renal disease, type 2 diabetes, obstructive sleep apnea, congestive heart failure, admitted after becoming unconscious during dialysis for approx 20 seconds    # SIRS likely 2/2 fistula site  RUE edema. POA. Stable  -Pain and edema to right arm. Fistula placed . Sensation intact  -Ultrasound: \"No evidence of dvt in the right upper extremity. Right HD access appears patent. Right radial, and ulnar arteries with antegrade flow.\"  -Seen by vascular surgery - no concerns   -Code sepsis overnight for temp and tachycardia. NP noted purulent drainage to fistula site with increased erythema.   -lactic negative  -WBC negative  - Blood cultures and wound culture obtained  -COVID/flu negative  -Started on cefepime and vanc  -nephrology and vascular updated   -Empiric IV abx x 2 weeks  -no drainage noted today. Redness and swelling noted    Hypotension after dialysis  Altered mental status likely syncope. POA. Resolved  -CT head negative   -possibly extra somnolence may be due to hydrocodone and Benadryl taken prior to dialysis   -avoid narcotic medications.  - Not clear if she was hypotensive during Dialysis.   -BP soft today  -ECHO- EF 73  -carotid duplex: Mild stenosis  - Received dialysis    - Rapid after dialysis for symptomatic hypotension   - 500cc bolus given, albumin given- improved BP   - consider hypotension during dialysis as reason for original syncope as BP is noted to drop during dialysis      End-stage renal disease.  POA  -Chest xray with pulmonary edema   -BNP 10,213- lasix given  BUN53, creatinine 10.3- similar to baseline  -Nephrology following- dialysis T/Th/Sat     Anemia, probably due to chronic kidney disease.  -hgb 8.7  -Ferritin 86, Iron 11, TIBC 190     Right  bundle-branch block, which is new.  The patient is asymptomatic, can be followed up as an outpatient.    Hx diabetes.    -continue on sliding scale   -A1C 5.9      #BMI (Calculated): 43.51    I have personally reviewed the radiographs, laboratory data in Epic and decisions and statements above are based partially on this personal interpretation.                 Care Plan discussed with: Patient    Discussed:  Care Plan    Prophylaxis:   Eliquis    Disposition:  Home w/Family, possibly HH- await PT recommedations           ___________________________________________________    Provider: LAURA Angel        Subjective:     Chief Complaint:  \"I feel week and tired\" Reports RUE pain.     ROS:  (bold if positive, if negative)    Tolerating PT  Tolerating Diet          Objective:       Vitals:          Last 24hrs VS reviewed since prior progress note. Most recent are:    Vitals:    06/07/25 1430   BP: (!) 120/51   Pulse:    Resp:    Temp:    SpO2:      SpO2 Readings from Last 6 Encounters:   06/07/25 96%   05/26/23 96%   05/19/23 99%          Intake/Output Summary (Last 24 hours) at 6/7/2025 1537  Last data filed at 6/7/2025 1255  Gross per 24 hour   Intake 600 ml   Output 3419 ml   Net -2819 ml          Exam:     Physical Exam:    Gen:  in no acute distress, obese, lethargic  HEENT:  Pink conjunctivae, PERRL, hearing intact to voice, moist mucous membranes  Neck:  Supple, without masses, thyroid non-tender  Resp:  No accessory muscle use, mild crackles  Card:  No murmurs, normal S1, S2 without thrills, bruits or peripheral edema  Abd:  Soft, non-tender, non-distended, normoactive bowel sounds are present  Musc:  No cyanosis or clubbing  --RUE edema, erythema, warm to the touch, tingling in right fingers, bruit heard in fistula site, No drainage noted from site  Skin:  No rashes or ulcers, skin turgor is good  Neuro:  Cranial nerves 3-12 are grossly intact,  strength is 5/5 bilaterally and dorsi /

## 2025-06-08 LAB
ANION GAP SERPL CALC-SCNC: 7 MMOL/L (ref 2–12)
BUN SERPL-MCNC: 30 MG/DL (ref 6–20)
BUN/CREAT SERPL: 4 (ref 12–20)
CALCIUM SERPL-MCNC: 8.8 MG/DL (ref 8.5–10.1)
CHLORIDE SERPL-SCNC: 98 MMOL/L (ref 97–108)
CO2 SERPL-SCNC: 27 MMOL/L (ref 21–32)
CREAT SERPL-MCNC: 6.92 MG/DL (ref 0.55–1.02)
CRP SERPL-MCNC: 16.9 MG/DL (ref 0–0.3)
ERYTHROCYTE [DISTWIDTH] IN BLOOD BY AUTOMATED COUNT: 14.7 % (ref 11.5–14.5)
GLUCOSE BLD STRIP.AUTO-MCNC: 116 MG/DL (ref 65–117)
GLUCOSE BLD STRIP.AUTO-MCNC: 146 MG/DL (ref 65–117)
GLUCOSE BLD STRIP.AUTO-MCNC: 181 MG/DL (ref 65–117)
GLUCOSE BLD STRIP.AUTO-MCNC: 188 MG/DL (ref 65–117)
GLUCOSE BLD STRIP.AUTO-MCNC: 219 MG/DL (ref 65–117)
GLUCOSE BLD STRIP.AUTO-MCNC: 269 MG/DL (ref 65–117)
GLUCOSE BLD STRIP.AUTO-MCNC: 51 MG/DL (ref 65–117)
GLUCOSE BLD STRIP.AUTO-MCNC: 66 MG/DL (ref 65–117)
GLUCOSE SERPL-MCNC: 120 MG/DL (ref 65–100)
HCT VFR BLD AUTO: 31.6 % (ref 35–47)
HGB BLD-MCNC: 9.7 G/DL (ref 11.5–16)
MAGNESIUM SERPL-MCNC: 2.1 MG/DL (ref 1.6–2.4)
MCH RBC QN AUTO: 28.2 PG (ref 26–34)
MCHC RBC AUTO-ENTMCNC: 30.7 G/DL (ref 30–36.5)
MCV RBC AUTO: 91.9 FL (ref 80–99)
NRBC # BLD: 0 K/UL (ref 0–0.01)
NRBC BLD-RTO: 0 PER 100 WBC
PHOSPHATE SERPL-MCNC: 4.2 MG/DL (ref 2.6–4.7)
PLATELET # BLD AUTO: 233 K/UL (ref 150–400)
PMV BLD AUTO: 9.3 FL (ref 8.9–12.9)
POTASSIUM SERPL-SCNC: 4.3 MMOL/L (ref 3.5–5.1)
PROCALCITONIN SERPL-MCNC: 2.92 NG/ML
RBC # BLD AUTO: 3.44 M/UL (ref 3.8–5.2)
SERVICE CMNT-IMP: ABNORMAL
SERVICE CMNT-IMP: NORMAL
SERVICE CMNT-IMP: NORMAL
SODIUM SERPL-SCNC: 132 MMOL/L (ref 136–145)
WBC # BLD AUTO: 8.6 K/UL (ref 3.6–11)

## 2025-06-08 PROCEDURE — 86140 C-REACTIVE PROTEIN: CPT

## 2025-06-08 PROCEDURE — 82962 GLUCOSE BLOOD TEST: CPT

## 2025-06-08 PROCEDURE — 6360000002 HC RX W HCPCS

## 2025-06-08 PROCEDURE — 83735 ASSAY OF MAGNESIUM: CPT

## 2025-06-08 PROCEDURE — 94660 CPAP INITIATION&MGMT: CPT

## 2025-06-08 PROCEDURE — 6370000000 HC RX 637 (ALT 250 FOR IP): Performed by: INTERNAL MEDICINE

## 2025-06-08 PROCEDURE — 94761 N-INVAS EAR/PLS OXIMETRY MLT: CPT

## 2025-06-08 PROCEDURE — 2580000003 HC RX 258

## 2025-06-08 PROCEDURE — 80048 BASIC METABOLIC PNL TOTAL CA: CPT

## 2025-06-08 PROCEDURE — G0545 PR INHERENT VISIT TO INPT: HCPCS | Performed by: INTERNAL MEDICINE

## 2025-06-08 PROCEDURE — 36415 COLL VENOUS BLD VENIPUNCTURE: CPT

## 2025-06-08 PROCEDURE — 6370000000 HC RX 637 (ALT 250 FOR IP): Performed by: HOSPITALIST

## 2025-06-08 PROCEDURE — 99223 1ST HOSP IP/OBS HIGH 75: CPT | Performed by: INTERNAL MEDICINE

## 2025-06-08 PROCEDURE — 2500000003 HC RX 250 WO HCPCS: Performed by: HOSPITALIST

## 2025-06-08 PROCEDURE — 84100 ASSAY OF PHOSPHORUS: CPT

## 2025-06-08 PROCEDURE — 85027 COMPLETE CBC AUTOMATED: CPT

## 2025-06-08 PROCEDURE — 1100000000 HC RM PRIVATE

## 2025-06-08 PROCEDURE — 84145 PROCALCITONIN (PCT): CPT

## 2025-06-08 RX ORDER — CARVEDILOL 6.25 MG/1
6.25 TABLET ORAL 2 TIMES DAILY
Status: DISCONTINUED | OUTPATIENT
Start: 2025-06-08 | End: 2025-06-19 | Stop reason: HOSPADM

## 2025-06-08 RX ORDER — INSULIN LISPRO 100 [IU]/ML
0-4 INJECTION, SOLUTION INTRAVENOUS; SUBCUTANEOUS
Status: DISCONTINUED | OUTPATIENT
Start: 2025-06-08 | End: 2025-06-18

## 2025-06-08 RX ORDER — DEXTROSE MONOHYDRATE 100 MG/ML
INJECTION, SOLUTION INTRAVENOUS CONTINUOUS PRN
Status: DISCONTINUED | OUTPATIENT
Start: 2025-06-08 | End: 2025-06-19 | Stop reason: HOSPADM

## 2025-06-08 RX ADMIN — DULOXETINE 60 MG: 30 CAPSULE, DELAYED RELEASE ORAL at 08:55

## 2025-06-08 RX ADMIN — DOCUSATE SODIUM 100 MG: 100 CAPSULE, LIQUID FILLED ORAL at 08:54

## 2025-06-08 RX ADMIN — INSULIN LISPRO 2 UNITS: 100 INJECTION, SOLUTION INTRAVENOUS; SUBCUTANEOUS at 12:19

## 2025-06-08 RX ADMIN — GABAPENTIN 100 MG: 100 CAPSULE ORAL at 14:06

## 2025-06-08 RX ADMIN — DULOXETINE 60 MG: 30 CAPSULE, DELAYED RELEASE ORAL at 21:02

## 2025-06-08 RX ADMIN — FERROUS SULFATE TAB 325 MG (65 MG ELEMENTAL FE) 325 MG: 325 (65 FE) TAB at 21:02

## 2025-06-08 RX ADMIN — ROSUVASTATIN CALCIUM 5 MG: 10 TABLET, FILM COATED ORAL at 21:06

## 2025-06-08 RX ADMIN — GABAPENTIN 100 MG: 100 CAPSULE ORAL at 21:02

## 2025-06-08 RX ADMIN — SEVELAMER CARBONATE 800 MG: 800 TABLET, FILM COATED ORAL at 16:36

## 2025-06-08 RX ADMIN — APIXABAN 5 MG: 5 TABLET, FILM COATED ORAL at 08:54

## 2025-06-08 RX ADMIN — SEVELAMER CARBONATE 800 MG: 800 TABLET, FILM COATED ORAL at 12:19

## 2025-06-08 RX ADMIN — GABAPENTIN 100 MG: 100 CAPSULE ORAL at 08:53

## 2025-06-08 RX ADMIN — ACETAMINOPHEN 650 MG: 325 TABLET ORAL at 21:06

## 2025-06-08 RX ADMIN — CARVEDILOL 6.25 MG: 6.25 TABLET, FILM COATED ORAL at 21:02

## 2025-06-08 RX ADMIN — SEVELAMER CARBONATE 800 MG: 800 TABLET, FILM COATED ORAL at 08:55

## 2025-06-08 RX ADMIN — CEFEPIME 1000 MG: 1 INJECTION, POWDER, FOR SOLUTION INTRAMUSCULAR; INTRAVENOUS at 02:18

## 2025-06-08 RX ADMIN — INSULIN LISPRO 1 UNITS: 100 INJECTION, SOLUTION INTRAVENOUS; SUBCUTANEOUS at 21:52

## 2025-06-08 RX ADMIN — LEVOTHYROXINE SODIUM 75 MCG: 0.03 TABLET ORAL at 06:47

## 2025-06-08 RX ADMIN — ACETAMINOPHEN 650 MG: 325 TABLET ORAL at 16:36

## 2025-06-08 RX ADMIN — SODIUM CHLORIDE, PRESERVATIVE FREE 10 ML: 5 INJECTION INTRAVENOUS at 21:02

## 2025-06-08 RX ADMIN — SODIUM CHLORIDE, PRESERVATIVE FREE 10 ML: 5 INJECTION INTRAVENOUS at 08:55

## 2025-06-08 RX ADMIN — APIXABAN 5 MG: 5 TABLET, FILM COATED ORAL at 21:02

## 2025-06-08 ASSESSMENT — PAIN SCALES - GENERAL
PAINLEVEL_OUTOF10: 7
PAINLEVEL_OUTOF10: 2

## 2025-06-08 ASSESSMENT — PAIN DESCRIPTION - DESCRIPTORS: DESCRIPTORS: ACHING;THROBBING

## 2025-06-08 ASSESSMENT — PAIN DESCRIPTION - ORIENTATION: ORIENTATION: RIGHT

## 2025-06-08 ASSESSMENT — PAIN SCALES - WONG BAKER: WONGBAKER_NUMERICALRESPONSE: NO HURT

## 2025-06-08 ASSESSMENT — PAIN DESCRIPTION - LOCATION: LOCATION: ARM

## 2025-06-08 NOTE — CARE COORDINATION
6/8/2025  10:06 AM      Care Management Initial Assessment  6/8/2025 10:06 AM  If patient is discharged prior to next notation, then this note serves as note for discharge by case management.    Reason for Admission:   End stage renal disease (HCC) [N18.6]  Altered mental status [R41.82]  RBBB [I45.10]  Syncope, unspecified syncope type [R55]  SIRS (systemic inflammatory response syndrome) (HCC) [R65.10]         Patient Admission Status: Inpatient  Date Admitted to INP: 6/7/25  RUR: Readmission Risk Score: 20.5    Hospitalization in the last 30 days (Readmission):  No        Advance Care Planning:  Code Status: Full Code  Primary Healthcare Decision Maker: Legal Next of Kin (4 children total - not involved.)   Advance Directive: Daughter - Dianne  - only located NOK. Pt has 4 distant children. Dianne reported she thought [facility] may have an Advance Directive, but unsure. CM educated Dianne that pt's NOK will need to make healthcare decisions for pt in the event pt is unable to make healthcare decisions. Dianne expressed an understanding. Dianne reported she did not have the contact information of her siblings.      __________________________________________________________________________  Assessment:      06/08/25 0951   Service Assessment   Patient Orientation Other (see comment)   Cognition Other (see comment)  (Memory loss. Unable to confirm all demographics)   History Provided By Child/Family   Primary Caregiver Self   Support Systems Family Members  (Niece stays with pt sometimes (answered pt's home number - [phone]), and provided pt's DTR's information for Dianne - [phone].)   Patient's Healthcare Decision Maker is: Legal Next of Kin  (4 children total - not involved.)   PCP Verified by CM Yes   Last Visit to PCP Within last year   Prior Functional Level Independent in ADLs/IADLs   Current Functional Level Independent in ADLs/IADLs   Can patient return to prior living arrangement Unknown at

## 2025-06-08 NOTE — PLAN OF CARE
Problem: Safety - Adult  Goal: Free from fall injury  6/8/2025 0937 by Amber Petty RN  Outcome: Progressing  6/7/2025 2337 by Mansoor Huber RN  Outcome: Progressing     Problem: Chronic Conditions and Co-morbidities  Goal: Patient's chronic conditions and co-morbidity symptoms are monitored and maintained or improved  6/8/2025 0937 by Amber Petty RN  Outcome: Progressing  6/7/2025 2337 by Mansoor Huber RN  Outcome: Progressing     Problem: Discharge Planning  Goal: Discharge to home or other facility with appropriate resources  6/8/2025 0937 by Amber Petty RN  Outcome: Progressing  6/7/2025 2337 by Mansoor Huber RN  Outcome: Progressing     Problem: Pain  Goal: Verbalizes/displays adequate comfort level or baseline comfort level  6/8/2025 0937 by Amber Petty RN  Outcome: Progressing  6/7/2025 2337 by Mansoor Huber RN  Outcome: Progressing     Problem: Skin/Tissue Integrity  Goal: Skin integrity remains intact  Description: 1.  Monitor for areas of redness and/or skin breakdown2.  Assess vascular access sites hourly3.  Every 4-6 hours minimum:  Change oxygen saturation probe site4.  Every 4-6 hours:  If on nasal continuous positive airway pressure, respiratory therapy assess nares and determine need for appliance change or resting period  6/8/2025 0937 by Amber Petty RN  Outcome: Progressing  6/7/2025 2337 by Mansoor Huber RN  Outcome: Progressing     Problem: ABCDS Injury Assessment  Goal: Absence of physical injury  6/8/2025 0937 by Amber Petty RN  Outcome: Progressing  6/7/2025 2337 by Mansoor Huber RN  Outcome: Progressing

## 2025-06-08 NOTE — PLAN OF CARE
Problem: Safety - Adult  Goal: Free from fall injury  6/7/2025 2337 by Mansoor Huber RN  Outcome: Progressing  6/7/2025 0949 by Amber Petty RN  Outcome: Progressing     Problem: Chronic Conditions and Co-morbidities  Goal: Patient's chronic conditions and co-morbidity symptoms are monitored and maintained or improved  6/7/2025 2337 by Mansoor Huber RN  Outcome: Progressing  6/7/2025 0949 by Amber Petty RN  Outcome: Progressing     Problem: Discharge Planning  Goal: Discharge to home or other facility with appropriate resources  6/7/2025 2337 by Mansoor Huber RN  Outcome: Progressing  6/7/2025 0949 by Amber Petty RN  Outcome: Progressing     Problem: Pain  Goal: Verbalizes/displays adequate comfort level or baseline comfort level  6/7/2025 2337 by Mansoor Huber RN  Outcome: Progressing  6/7/2025 0949 by Amber Petty RN  Outcome: Progressing     Problem: Skin/Tissue Integrity  Goal: Skin integrity remains intact  Description: 1.  Monitor for areas of redness and/or skin breakdown2.  Assess vascular access sites hourly3.  Every 4-6 hours minimum:  Change oxygen saturation probe site4.  Every 4-6 hours:  If on nasal continuous positive airway pressure, respiratory therapy assess nares and determine need for appliance change or resting period  6/7/2025 2337 by Mansoor Hbuer RN  Outcome: Progressing  6/7/2025 0949 by Amber Petty RN  Outcome: Progressing

## 2025-06-08 NOTE — CONSULTS
Infectious Disease Consult Note    Assessment / Plan:      79-year-old female with infection of right upper extremity AV graft surgical site.    Continue vancomycin and cefepime per HD dosing    Appreciate vascular surgery input as likely will need to remove staples to allow for improvement of infection.  Watch for any noted drainage or purulence to send for culture at that time.    Follow-up blood cultures, pending.         Reason for Consult: Fever and AV graft infection  Date of Consultation: June 8, 2025  Date of Admission: 6/5/2025  Referring Physician: Dr. Flanagan    HPI:    79-year-old female with DM, CHF, ESRD on HD now via right chest wall permacath, formerly by left upper extremity AV graft for which was placed in 2023 and now failed and status post 6/2 AV graft placement on right upper extremity at [facility].  Admitted on 6/5 for syncopal episode and sent from dialysis.  Workup largely benign although starting to develop redness swelling pain on right upper extremity postop and concern for right upper extremity AV graft infection.  Course of hospitalization complicated by fever to 100.9 on 6/6 and started on vancomycin and cefepime empiric antibiotics.  Noted to have purulent drainage from 1 of surgical incisions although I do not see any at this time.  Patient has been seen by vascular surgery who recommends antibiotic therapy without further intervention currently.    Duplex ultrasound of right upper extremity appears patent no DVT.    Past Medical History:  Past Medical History:   Diagnosis Date    Arthritis     CHF (congestive heart failure) (HCC)     Diabetes mellitus (HCC)     Heart murmur     Hemodialysis patient     Hyperlipidemia     Hypertension     Kidney failure     MRSA (methicillin resistant Staphylococcus aureus)     Prolonged emergence from general anesthesia     Seizure (HCC)     AS A CHILD    Sleep apnea     USES CPAP    Thyroid disease     HYPOTHYROIDISM         Surgical

## 2025-06-08 NOTE — PROGRESS NOTES
[facility]  [address] [place] [address] [phone]        Hospitalist Progress Note      NAME: Malini Griggs   :  1945  MRM:  [id number]    Date/Time of service: 2025  9:43 AM       Subjective:     Chief Complaint:  Patient was personally seen and examined by me during this time period.  Chart reviewed.  Still with swelling, pain at right arm fistula       Objective:       Vitals:       Last 24hrs VS reviewed since prior progress note. Most recent are:    Vitals:    25 0832   BP: (!) 114/44   Pulse: 83   Resp: 20   Temp: 98.1 °F (36.7 °C)   SpO2: 97%     SpO2 Readings from Last 6 Encounters:   25 97%   23 96%   23 99%          Intake/Output Summary (Last 24 hours) at 2025 0943  Last data filed at 2025 1255  Gross per 24 hour   Intake 600 ml   Output 3419 ml   Net -2819 ml        Exam:     Physical Exam:    Gen:  elderly, obese, NAD  HEENT:  Pink conjunctivae, PERRL, hearing intact to voice, moist mucous membranes  Neck:  Supple, without masses, thyroid non-tender  Resp:  No accessory muscle use, clear breath sounds without wheezes rales or rhonchi  Card:  No murmurs, normal S1, S2 without thrills, bruits or peripheral edema  Abd:  Soft, non-tender, non-distended, normoactive bowel sounds are present  Musc:  No cyanosis or clubbing  Skin:  RUE w/ erythema, swelling, pain  Neuro:  Cranial nerves 3-12 are grossly intact, follows commands appropriately  Psych:  Good insight, oriented to person, place and time, alert    Medications Reviewed: (see below)    Lab Data Reviewed: (see below)    ______________________________________________________________________    Medications:     Current Facility-Administered Medications   Medication Dose Route Frequency    insulin lispro (HUMALOG,ADMELOG) injection vial 0-4 Units  0-4 Units SubCUTAneous 4x Daily AC & HS    glucose chewable tablet 16 g  4 tablet Oral PRN    dextrose bolus 10%  06/05/25  1509 06/06/25  0605 06/07/25  0457 06/08/25  0816   * 135* 130* 132*   K 4.7 5.0 5.4* 4.3   CL 99 99 97 98   CO2 29 28 25 27   BUN 41* 47* 53* 30*   MG  --  2.0 2.0 2.1   PHOS  --  5.0* 4.6 4.2   ALT <6* <6*  --   --      No results found for: \"GLUCPOC\"       Assessment / Plan:     78 yo hx of HTN, DM, ESRD, recent AV fistula, presented w/ AMS, syncope, hypotension, possible infected RUE fistula     1) Infected RUE AV fistula: sepsis POA, now improving.  Dopplers unremarkable.  Will monitor cultures.  Cont empiric IV Vanc/cefepime.  Vascular surg following.  Will also consult ID    2) Hypotension: occurred after dialysis, now improving.  Will decrease BP meds.  Monitor closely    3) Syncope: now resolved.  Likely from hypotension.  Echo w/ EF 60-65%.  Will monitor     4) DM type 2 w/ renal complications: A1C 5.9%.  Has issues w/ hypoglycemia.  Will decrease Lantus.  Will monitor closely    5) ESRD: cont HD TTS.  Renal following    6) Chronic anemia: due to ESRD.  Will monitor Hgb     7) Hx of PE/DVT: cont eliquis    **Prior records, notes, labs, radiology, and medications reviewed [facility]**    Total time spent with patient care: 45 Minutes **I personally saw and examined the patient during this time period**                 Care Plan discussed with: Patient, nursing     Discussed:  Care Plan    Prophylaxis:   eliquis    Disposition:   PT, OT, RN           ___________________________________________________    Attending Physician: Charlie Flanagan MD

## 2025-06-09 PROBLEM — T82.7XXA INFECTION OF AV GRAFT FOR DIALYSIS: Status: ACTIVE | Noted: 2025-06-09

## 2025-06-09 LAB
BACTERIA SPEC CULT: NORMAL
BACTERIA SPEC CULT: NORMAL
GLUCOSE BLD STRIP.AUTO-MCNC: 112 MG/DL (ref 65–117)
GLUCOSE BLD STRIP.AUTO-MCNC: 163 MG/DL (ref 65–117)
GLUCOSE BLD STRIP.AUTO-MCNC: 189 MG/DL (ref 65–117)
GLUCOSE BLD STRIP.AUTO-MCNC: 210 MG/DL (ref 65–117)
SERVICE CMNT-IMP: ABNORMAL
SERVICE CMNT-IMP: NORMAL
SERVICE CMNT-IMP: NORMAL

## 2025-06-09 PROCEDURE — 2500000003 HC RX 250 WO HCPCS: Performed by: HOSPITALIST

## 2025-06-09 PROCEDURE — 6370000000 HC RX 637 (ALT 250 FOR IP): Performed by: HOSPITALIST

## 2025-06-09 PROCEDURE — 5A09357 ASSISTANCE WITH RESPIRATORY VENTILATION, LESS THAN 24 CONSECUTIVE HOURS, CONTINUOUS POSITIVE AIRWAY PRESSURE: ICD-10-PCS | Performed by: HOSPITALIST

## 2025-06-09 PROCEDURE — 1100000000 HC RM PRIVATE

## 2025-06-09 PROCEDURE — 2580000003 HC RX 258

## 2025-06-09 PROCEDURE — 6370000000 HC RX 637 (ALT 250 FOR IP): Performed by: INTERNAL MEDICINE

## 2025-06-09 PROCEDURE — 82962 GLUCOSE BLOOD TEST: CPT

## 2025-06-09 PROCEDURE — 99232 SBSQ HOSP IP/OBS MODERATE 35: CPT | Performed by: INTERNAL MEDICINE

## 2025-06-09 PROCEDURE — 6360000002 HC RX W HCPCS: Performed by: INTERNAL MEDICINE

## 2025-06-09 PROCEDURE — 2700000000 HC OXYGEN THERAPY PER DAY

## 2025-06-09 PROCEDURE — 94761 N-INVAS EAR/PLS OXIMETRY MLT: CPT

## 2025-06-09 PROCEDURE — 94660 CPAP INITIATION&MGMT: CPT

## 2025-06-09 PROCEDURE — 6360000002 HC RX W HCPCS

## 2025-06-09 RX ORDER — TRAMADOL HYDROCHLORIDE 50 MG/1
50 TABLET ORAL EVERY 6 HOURS PRN
Status: DISCONTINUED | OUTPATIENT
Start: 2025-06-09 | End: 2025-06-10

## 2025-06-09 RX ORDER — INSULIN GLARGINE 100 [IU]/ML
10 INJECTION, SOLUTION SUBCUTANEOUS NIGHTLY
Status: DISCONTINUED | OUTPATIENT
Start: 2025-06-09 | End: 2025-06-18

## 2025-06-09 RX ADMIN — APIXABAN 5 MG: 5 TABLET, FILM COATED ORAL at 08:10

## 2025-06-09 RX ADMIN — ACETAMINOPHEN 650 MG: 325 TABLET ORAL at 14:16

## 2025-06-09 RX ADMIN — INSULIN LISPRO 1 UNITS: 100 INJECTION, SOLUTION INTRAVENOUS; SUBCUTANEOUS at 20:35

## 2025-06-09 RX ADMIN — APIXABAN 5 MG: 5 TABLET, FILM COATED ORAL at 20:34

## 2025-06-09 RX ADMIN — DULOXETINE 60 MG: 30 CAPSULE, DELAYED RELEASE ORAL at 08:10

## 2025-06-09 RX ADMIN — SEVELAMER CARBONATE 800 MG: 800 TABLET, FILM COATED ORAL at 17:29

## 2025-06-09 RX ADMIN — FERROUS SULFATE TAB 325 MG (65 MG ELEMENTAL FE) 325 MG: 325 (65 FE) TAB at 20:34

## 2025-06-09 RX ADMIN — CARVEDILOL 6.25 MG: 6.25 TABLET, FILM COATED ORAL at 20:35

## 2025-06-09 RX ADMIN — HYDROMORPHONE HYDROCHLORIDE 0.5 MG: 1 INJECTION, SOLUTION INTRAMUSCULAR; INTRAVENOUS; SUBCUTANEOUS at 17:29

## 2025-06-09 RX ADMIN — LEVOTHYROXINE SODIUM 75 MCG: 0.03 TABLET ORAL at 06:45

## 2025-06-09 RX ADMIN — INSULIN LISPRO 1 UNITS: 100 INJECTION, SOLUTION INTRAVENOUS; SUBCUTANEOUS at 17:30

## 2025-06-09 RX ADMIN — SEVELAMER CARBONATE 800 MG: 800 TABLET, FILM COATED ORAL at 08:10

## 2025-06-09 RX ADMIN — SODIUM CHLORIDE, PRESERVATIVE FREE 10 ML: 5 INJECTION INTRAVENOUS at 20:35

## 2025-06-09 RX ADMIN — LOSARTAN POTASSIUM 25 MG: 25 TABLET, FILM COATED ORAL at 08:10

## 2025-06-09 RX ADMIN — ROSUVASTATIN CALCIUM 5 MG: 10 TABLET, FILM COATED ORAL at 20:34

## 2025-06-09 RX ADMIN — GABAPENTIN 100 MG: 100 CAPSULE ORAL at 14:15

## 2025-06-09 RX ADMIN — DULOXETINE 60 MG: 30 CAPSULE, DELAYED RELEASE ORAL at 20:35

## 2025-06-09 RX ADMIN — GABAPENTIN 100 MG: 100 CAPSULE ORAL at 08:10

## 2025-06-09 RX ADMIN — ACETAMINOPHEN 650 MG: 325 TABLET ORAL at 06:45

## 2025-06-09 RX ADMIN — CEFEPIME 1000 MG: 1 INJECTION, POWDER, FOR SOLUTION INTRAMUSCULAR; INTRAVENOUS at 02:59

## 2025-06-09 RX ADMIN — ACETAMINOPHEN 650 MG: 325 TABLET ORAL at 20:47

## 2025-06-09 RX ADMIN — CARVEDILOL 6.25 MG: 6.25 TABLET, FILM COATED ORAL at 08:10

## 2025-06-09 RX ADMIN — SEVELAMER CARBONATE 800 MG: 800 TABLET, FILM COATED ORAL at 12:21

## 2025-06-09 RX ADMIN — GABAPENTIN 100 MG: 100 CAPSULE ORAL at 20:34

## 2025-06-09 ASSESSMENT — PAIN - FUNCTIONAL ASSESSMENT
PAIN_FUNCTIONAL_ASSESSMENT: ACTIVITIES ARE NOT PREVENTED
PAIN_FUNCTIONAL_ASSESSMENT: ACTIVITIES ARE NOT PREVENTED

## 2025-06-09 ASSESSMENT — PAIN DESCRIPTION - LOCATION
LOCATION: HEAD
LOCATION: NECK
LOCATION: NECK;HEAD

## 2025-06-09 ASSESSMENT — PAIN SCALES - GENERAL
PAINLEVEL_OUTOF10: 2
PAINLEVEL_OUTOF10: 3
PAINLEVEL_OUTOF10: 5
PAINLEVEL_OUTOF10: 7
PAINLEVEL_OUTOF10: 3

## 2025-06-09 ASSESSMENT — PAIN DESCRIPTION - ORIENTATION
ORIENTATION: ANTERIOR
ORIENTATION: POSTERIOR
ORIENTATION: POSTERIOR;LEFT

## 2025-06-09 ASSESSMENT — PAIN DESCRIPTION - DESCRIPTORS
DESCRIPTORS: ACHING
DESCRIPTORS: ACHING;THROBBING
DESCRIPTORS: ACHING
DESCRIPTORS: ACHING

## 2025-06-09 ASSESSMENT — PAIN SCALES - WONG BAKER: WONGBAKER_NUMERICALRESPONSE: HURTS A LITTLE BIT

## 2025-06-09 NOTE — PLAN OF CARE
Problem: Safety - Adult  Goal: Free from fall injury  Outcome: Progressing  Flowsheets (Taken 6/8/2025 2036)  Free From Fall Injury: Instruct family/caregiver on patient safety     Problem: Chronic Conditions and Co-morbidities  Goal: Patient's chronic conditions and co-morbidity symptoms are monitored and maintained or improved  Outcome: Progressing     Problem: Pain  Goal: Verbalizes/displays adequate comfort level or baseline comfort level  Outcome: Progressing     Problem: Skin/Tissue Integrity  Goal: Skin integrity remains intact  Description: 1.  Monitor for areas of redness and/or skin breakdown2.  Assess vascular access sites hourly3.  Every 4-6 hours minimum:  Change oxygen saturation probe site4.  Every 4-6 hours:  If on nasal continuous positive airway pressure, respiratory therapy assess nares and determine need for appliance change or resting period  Outcome: Progressing     Problem: ABCDS Injury Assessment  Goal: Absence of physical injury  Outcome: Progressing

## 2025-06-09 NOTE — CONSULTS
Infectious Disease Progress Note    Assessment / Plan:      79-year-old female with infection of right upper extremity AV graft surgical site.    Continue vancomycin and cefepime per HD dosing    Appreciate vascular surgery input as likely will need to remove staples to allow for improvement of infection.      Blood and wound cultures pending     Anticipate discharge with antibiotics dosed on hemodialysis         Reason for Consult: Fever and AV graft infection  Date of Consultation: June 9, 2025  Date of Admission: 6/5/2025  Referring Physician: Dr. Flanagan    HPI:    79-year-old female with DM, CHF, ESRD on HD now via right chest wall permacath, formerly by left upper extremity AV graft for which was placed in 2023 and now failed and status post 6/2 AV graft placement on right upper extremity at [facility].  Admitted on 6/5 for syncopal episode and sent from dialysis.  Workup largely benign although starting to develop redness swelling pain on right upper extremity postop and concern for right upper extremity AV graft infection.  Course of hospitalization complicated by fever to 100.9 on 6/6 and started on vancomycin and cefepime empiric antibiotics.  Noted to have purulent drainage from 1 of surgical incisions although I do not see any at this time.  Patient has been seen by vascular surgery who recommends antibiotic therapy without further intervention currently.    Duplex ultrasound of right upper extremity appears patent no DVT.    6/9/2025.  No new complaints    Past Medical History:  Past Medical History:   Diagnosis Date    Arthritis     CHF (congestive heart failure) (HCC)     Diabetes mellitus (HCC)     Heart murmur     Hemodialysis patient     Hyperlipidemia     Hypertension     Kidney failure     MRSA (methicillin resistant Staphylococcus aureus)     Prolonged emergence from general anesthesia     Seizure (HCC)     AS A CHILD    Sleep apnea     USES CPAP    Thyroid disease     HYPOTHYROIDISM

## 2025-06-09 NOTE — PROGRESS NOTES
DANE HENSON [facility]    Malini Griggs  YOB: 1945          Assessment & Plan:     ESRD on HD TTS at [facility]  AMS  New RUE AVF with arm edema  DM2    Rec:  No acute indication HD  HD tomorrow and TTS  On ABX per ID       Subjective:   CC: ESRD  HPI: Wants to go home. Arm edema, on ABX. Infection vs graft reaction  Current Facility-Administered Medications   Medication Dose Route Frequency    [Held by provider] insulin glargine (LANTUS) injection vial 10 Units  10 Units SubCUTAneous Nightly    insulin lispro (HUMALOG,ADMELOG) injection vial 0-4 Units  0-4 Units SubCUTAneous 4x Daily AC & HS    glucose chewable tablet 16 g  4 tablet Oral PRN    dextrose bolus 10% 125 mL  125 mL IntraVENous PRN    Or    dextrose bolus 10% 250 mL  250 mL IntraVENous PRN    glucagon injection 1 mg  1 mg SubCUTAneous PRN    dextrose 10 % infusion   IntraVENous Continuous PRN    carvedilol (COREG) tablet 6.25 mg  6.25 mg Oral BID    cefepime (MAXIPIME) 1,000 mg in sodium chloride 0.9 % 50 mL IVPB (addEASE)  1,000 mg IntraVENous Q24H    Vancomycin: pharmacy dosing after each HD session   Other RX Placeholder    epoetin jay-epbx (RETACRIT) injection 3,000 Units  3,000 Units SubCUTAneous Once per day on Tuesday Thursday Saturday    ferrous sulfate (IRON 325) tablet 325 mg  325 mg Oral QHS    sevelamer (RENVELA) tablet 800 mg  800 mg Oral TID WC    docusate sodium (COLACE) capsule 100 mg  100 mg Oral Every Other Day    sodium chloride flush 0.9 % injection 5-40 mL  5-40 mL IntraVENous 2 times per day    sodium chloride flush 0.9 % injection 5-40 mL  5-40 mL IntraVENous PRN    0.9 % sodium chloride infusion   IntraVENous PRN    ondansetron (ZOFRAN-ODT) disintegrating tablet 4 mg  4 mg Oral Q8H PRN    Or    ondansetron (ZOFRAN) injection 4 mg  4 mg IntraVENous Q6H PRN    polyethylene glycol (GLYCOLAX) packet 17 g  17 g Oral Daily PRN    acetaminophen (TYLENOL) tablet 650 mg  650 mg

## 2025-06-09 NOTE — PROGRESS NOTES
Attempted to work with the patient for Physical Therapy, chart reviewed and discussed with nurse cleared for therapy. Patient supine on bed when received. Patient asked to defer therapy having a lot of neck pain now. Communicated with nurse who agreed to monitor patient.

## 2025-06-09 NOTE — CARE COORDINATION
6/9/2025 1:43 PM   Care Management Progress Note    Reason for Admission:   End stage renal disease (HCC) [N18.6]  Altered mental status [R41.82]  RBBB [I45.10]  Syncope, unspecified syncope type [R55]  SIRS (systemic inflammatory response syndrome) (HCC) [R65.10]         Patient Admission Status: Inpatient  RUR: 18%  Hospitalization in the last 30 days (Readmission):  No        Transition of care plan:  Ongoing medical management  ID consulted  Pt on HD T/T/S through [email or web address], 10:15AM chair  Pt lives with her granddaughter, planning to pt to return home at discharge with home health through [facility](PT/OT/RN)  Discharge plan communicated with patient and/or discharge caregiver: Yes    Date 1st IMM letter given: Needs to be given  Outpatient follow-up.  Transport at discharge: Pt will need transport home, her wheelchair is at pt's bedside.       Met with pt at bedside. Pt was a&ox4 during conversation. Pt confirmed address and phone numbers. Pt reported her granddaughter, Linette(27 years old) lives with her and assists her as needed. Pt has a wheelchair and bipap at home. Pt is on HD through [email or web address], T/T/S 10:15AM chair time. Pt uses [facility] transport to HD.   Pt reported if an emergency happened she would want her granddaughter, Linette to be her decision maker. Spiritual Care consult placed to assist pt with AMD.   Discussed home health, pt agreeable. Pt selected [facility] as preferred home health agency as she has used this agency in the past. [facility] Health referral sent [facility]       06/09/25 2936   Services At/After Discharge   Transition of Care Consult (CM Consult) Home Health   Internal Home Health No   Reason Outside Agency Chosen Patient already serviced by other home care/hospice agency   Services At/After Discharge Home Health   [facility] Resource Information Provided? No   Mode of Transport at Discharge Other (see comment)   Condition of Participation: Discharge Planning

## 2025-06-09 NOTE — PROGRESS NOTES
DANE HENSON [facility]  [address] [place] [address] [phone]        Hospitalist Progress Note      NAME: Malini Griggs   :  1945  MRM:  [id number]    Date/Time of service: 2025  12:28 PM       Subjective:     Chief Complaint:  Patient was personally seen and examined by me during this time period.  Chart reviewed.  No fevers, chills.  C/o fatigue        Objective:       Vitals:       Last 24hrs VS reviewed since prior progress note. Most recent are:    Vitals:    25 1144   BP: (!) 119/58   Pulse: 94   Resp: 16   Temp: 97.7 °F (36.5 °C)   SpO2: 92%     SpO2 Readings from Last 6 Encounters:   25 92%   23 96%   23 99%        No intake or output data in the 24 hours ending 25 1228       Exam:     Physical Exam:    Gen:  elderly, obese, NAD  HEENT:  Pink conjunctivae, PERRL, hearing intact to voice, moist mucous membranes  Neck:  Supple, without masses, thyroid non-tender  Resp:  No accessory muscle use, clear breath sounds without wheezes rales or rhonchi  Card:  No murmurs, normal S1, S2 without thrills, bruits or peripheral edema  Abd:  Soft, non-tender, non-distended, normoactive bowel sounds are present  Musc:  No cyanosis or clubbing  Skin:  RUE w/ erythema, swelling, pain.  No drainage   Neuro:  Cranial nerves 3-12 are grossly intact, follows commands appropriately  Psych:  Good insight, oriented to person, place and time, alert    Medications Reviewed: (see below)    Lab Data Reviewed: (see below)    ______________________________________________________________________    Medications:     Current Facility-Administered Medications   Medication Dose Route Frequency    [Held by provider] insulin glargine (LANTUS) injection vial 10 Units  10 Units SubCUTAneous Nightly    insulin lispro (HUMALOG,ADMELOG) injection vial 0-4 Units  0-4 Units SubCUTAneous 4x Daily AC & HS    glucose chewable tablet 16 g  4 tablet Oral PRN    dextrose

## 2025-06-09 NOTE — PROGRESS NOTES
Ultrasound IV by Triny Garcia RN :  Procedure Note    Ultrasound IV education provided to patient. Opportunities for questions given.     Ultrasound used for PIV placement:  20 gauge  Nexiva 1.75inch  Left AC location.  1 X Attempt(s).    Vigorous blood return present and saline flushes with ease.     Procedure tolerated well. Primary RN aware of IV placement and added to LDA.      Dottie Garcia, RN

## 2025-06-10 LAB
ANION GAP SERPL CALC-SCNC: 11 MMOL/L (ref 2–12)
BACTERIA SPEC CULT: NORMAL
BUN SERPL-MCNC: 57 MG/DL (ref 6–20)
BUN/CREAT SERPL: 5 (ref 12–20)
CALCIUM SERPL-MCNC: 8.9 MG/DL (ref 8.5–10.1)
CHLORIDE SERPL-SCNC: 95 MMOL/L (ref 97–108)
CO2 SERPL-SCNC: 22 MMOL/L (ref 21–32)
CREAT SERPL-MCNC: 10.8 MG/DL (ref 0.55–1.02)
CRP SERPL-MCNC: 27.6 MG/DL (ref 0–0.3)
ERYTHROCYTE [DISTWIDTH] IN BLOOD BY AUTOMATED COUNT: 14.6 % (ref 11.5–14.5)
GLUCOSE BLD STRIP.AUTO-MCNC: 112 MG/DL (ref 65–117)
GLUCOSE BLD STRIP.AUTO-MCNC: 140 MG/DL (ref 65–117)
GLUCOSE BLD STRIP.AUTO-MCNC: 143 MG/DL (ref 65–117)
GLUCOSE BLD STRIP.AUTO-MCNC: 152 MG/DL (ref 65–117)
GLUCOSE SERPL-MCNC: 158 MG/DL (ref 65–100)
GRAM STN SPEC: NORMAL
GRAM STN SPEC: NORMAL
HCT VFR BLD AUTO: 27 % (ref 35–47)
HGB BLD-MCNC: 8.6 G/DL (ref 11.5–16)
MAGNESIUM SERPL-MCNC: 2 MG/DL (ref 1.6–2.4)
MCH RBC QN AUTO: 27.8 PG (ref 26–34)
MCHC RBC AUTO-ENTMCNC: 31.9 G/DL (ref 30–36.5)
MCV RBC AUTO: 87.4 FL (ref 80–99)
NRBC # BLD: 0 K/UL (ref 0–0.01)
NRBC BLD-RTO: 0 PER 100 WBC
PHOSPHATE SERPL-MCNC: 5.1 MG/DL (ref 2.6–4.7)
PLATELET # BLD AUTO: 272 K/UL (ref 150–400)
PMV BLD AUTO: 9.4 FL (ref 8.9–12.9)
POTASSIUM SERPL-SCNC: 5.9 MMOL/L (ref 3.5–5.1)
RBC # BLD AUTO: 3.09 M/UL (ref 3.8–5.2)
SERVICE CMNT-IMP: ABNORMAL
SERVICE CMNT-IMP: NORMAL
SERVICE CMNT-IMP: NORMAL
SODIUM SERPL-SCNC: 128 MMOL/L (ref 136–145)
VANCOMYCIN SERPL-MCNC: 18 UG/ML
WBC # BLD AUTO: 15.8 K/UL (ref 3.6–11)

## 2025-06-10 PROCEDURE — 86140 C-REACTIVE PROTEIN: CPT

## 2025-06-10 PROCEDURE — 82962 GLUCOSE BLOOD TEST: CPT

## 2025-06-10 PROCEDURE — 6370000000 HC RX 637 (ALT 250 FOR IP): Performed by: HOSPITALIST

## 2025-06-10 PROCEDURE — 6370000000 HC RX 637 (ALT 250 FOR IP): Performed by: INTERNAL MEDICINE

## 2025-06-10 PROCEDURE — 6360000002 HC RX W HCPCS: Performed by: INTERNAL MEDICINE

## 2025-06-10 PROCEDURE — 90935 HEMODIALYSIS ONE EVALUATION: CPT

## 2025-06-10 PROCEDURE — 94660 CPAP INITIATION&MGMT: CPT

## 2025-06-10 PROCEDURE — 2580000003 HC RX 258: Performed by: STUDENT IN AN ORGANIZED HEALTH CARE EDUCATION/TRAINING PROGRAM

## 2025-06-10 PROCEDURE — 84100 ASSAY OF PHOSPHORUS: CPT

## 2025-06-10 PROCEDURE — P9047 ALBUMIN (HUMAN), 25%, 50ML: HCPCS | Performed by: INTERNAL MEDICINE

## 2025-06-10 PROCEDURE — 2580000003 HC RX 258

## 2025-06-10 PROCEDURE — 94761 N-INVAS EAR/PLS OXIMETRY MLT: CPT

## 2025-06-10 PROCEDURE — 36415 COLL VENOUS BLD VENIPUNCTURE: CPT

## 2025-06-10 PROCEDURE — 80048 BASIC METABOLIC PNL TOTAL CA: CPT

## 2025-06-10 PROCEDURE — 80202 ASSAY OF VANCOMYCIN: CPT

## 2025-06-10 PROCEDURE — 2500000003 HC RX 250 WO HCPCS: Performed by: HOSPITALIST

## 2025-06-10 PROCEDURE — 85027 COMPLETE CBC AUTOMATED: CPT

## 2025-06-10 PROCEDURE — 6360000002 HC RX W HCPCS

## 2025-06-10 PROCEDURE — 6360000002 HC RX W HCPCS: Performed by: STUDENT IN AN ORGANIZED HEALTH CARE EDUCATION/TRAINING PROGRAM

## 2025-06-10 PROCEDURE — 1100000000 HC RM PRIVATE

## 2025-06-10 PROCEDURE — 83735 ASSAY OF MAGNESIUM: CPT

## 2025-06-10 RX ORDER — TRAMADOL HYDROCHLORIDE 50 MG/1
50 TABLET ORAL EVERY 12 HOURS PRN
Status: DISCONTINUED | OUTPATIENT
Start: 2025-06-10 | End: 2025-06-19 | Stop reason: HOSPADM

## 2025-06-10 RX ORDER — ALBUMIN (HUMAN) 12.5 G/50ML
12.5 SOLUTION INTRAVENOUS AS NEEDED
Status: DISCONTINUED | OUTPATIENT
Start: 2025-06-10 | End: 2025-06-19 | Stop reason: HOSPADM

## 2025-06-10 RX ADMIN — ALBUMIN (HUMAN) 12.5 G: 0.25 INJECTION, SOLUTION INTRAVENOUS at 18:50

## 2025-06-10 RX ADMIN — DULOXETINE 60 MG: 30 CAPSULE, DELAYED RELEASE ORAL at 22:07

## 2025-06-10 RX ADMIN — CEFEPIME 1000 MG: 1 INJECTION, POWDER, FOR SOLUTION INTRAMUSCULAR; INTRAVENOUS at 02:13

## 2025-06-10 RX ADMIN — ROSUVASTATIN CALCIUM 5 MG: 10 TABLET, FILM COATED ORAL at 22:07

## 2025-06-10 RX ADMIN — LOSARTAN POTASSIUM 25 MG: 25 TABLET, FILM COATED ORAL at 09:40

## 2025-06-10 RX ADMIN — VANCOMYCIN HYDROCHLORIDE 1000 MG: 1 INJECTION, POWDER, LYOPHILIZED, FOR SOLUTION INTRAVENOUS at 22:20

## 2025-06-10 RX ADMIN — FERROUS SULFATE TAB 325 MG (65 MG ELEMENTAL FE) 325 MG: 325 (65 FE) TAB at 22:07

## 2025-06-10 RX ADMIN — DOCUSATE SODIUM 100 MG: 100 CAPSULE, LIQUID FILLED ORAL at 09:40

## 2025-06-10 RX ADMIN — GABAPENTIN 100 MG: 100 CAPSULE ORAL at 09:40

## 2025-06-10 RX ADMIN — SEVELAMER CARBONATE 800 MG: 800 TABLET, FILM COATED ORAL at 11:56

## 2025-06-10 RX ADMIN — ACETAMINOPHEN 650 MG: 325 TABLET ORAL at 19:37

## 2025-06-10 RX ADMIN — LEVOTHYROXINE SODIUM 75 MCG: 0.03 TABLET ORAL at 05:59

## 2025-06-10 RX ADMIN — GABAPENTIN 100 MG: 100 CAPSULE ORAL at 14:26

## 2025-06-10 RX ADMIN — ALBUMIN (HUMAN) 12.5 G: 0.25 INJECTION, SOLUTION INTRAVENOUS at 17:54

## 2025-06-10 RX ADMIN — HYDROMORPHONE HYDROCHLORIDE 0.5 MG: 1 INJECTION, SOLUTION INTRAMUSCULAR; INTRAVENOUS; SUBCUTANEOUS at 03:35

## 2025-06-10 RX ADMIN — DULOXETINE 60 MG: 30 CAPSULE, DELAYED RELEASE ORAL at 09:40

## 2025-06-10 RX ADMIN — CARVEDILOL 6.25 MG: 6.25 TABLET, FILM COATED ORAL at 09:40

## 2025-06-10 RX ADMIN — SODIUM CHLORIDE, PRESERVATIVE FREE 10 ML: 5 INJECTION INTRAVENOUS at 22:07

## 2025-06-10 RX ADMIN — APIXABAN 5 MG: 5 TABLET, FILM COATED ORAL at 09:40

## 2025-06-10 RX ADMIN — SEVELAMER CARBONATE 800 MG: 800 TABLET, FILM COATED ORAL at 09:40

## 2025-06-10 RX ADMIN — APIXABAN 5 MG: 5 TABLET, FILM COATED ORAL at 22:07

## 2025-06-10 RX ADMIN — SODIUM CHLORIDE, PRESERVATIVE FREE 10 ML: 5 INJECTION INTRAVENOUS at 09:41

## 2025-06-10 RX ADMIN — GABAPENTIN 100 MG: 100 CAPSULE ORAL at 22:07

## 2025-06-10 RX ADMIN — ALBUMIN (HUMAN) 12.5 G: 0.25 INJECTION, SOLUTION INTRAVENOUS at 19:19

## 2025-06-10 ASSESSMENT — PAIN DESCRIPTION - LOCATION
LOCATION: HEAD

## 2025-06-10 ASSESSMENT — PAIN SCALES - GENERAL
PAINLEVEL_OUTOF10: 4
PAINLEVEL_OUTOF10: 0
PAINLEVEL_OUTOF10: 6
PAINLEVEL_OUTOF10: 9

## 2025-06-10 ASSESSMENT — PAIN SCALES - WONG BAKER: WONGBAKER_NUMERICALRESPONSE: HURTS A LITTLE BIT

## 2025-06-10 ASSESSMENT — PAIN DESCRIPTION - DESCRIPTORS: DESCRIPTORS: ACHING

## 2025-06-10 ASSESSMENT — PAIN DESCRIPTION - ORIENTATION: ORIENTATION: ANTERIOR

## 2025-06-10 NOTE — PLAN OF CARE
Problem: Safety - Adult  Goal: Free from fall injury  6/9/2025 2134 by Sergio Viveros RN  Outcome: Progressing  6/9/2025 0857 by Katherine Garza RN  Outcome: Progressing     Problem: Chronic Conditions and Co-morbidities  Goal: Patient's chronic conditions and co-morbidity symptoms are monitored and maintained or improved  6/9/2025 2134 by Sergio Viveros RN  Outcome: Progressing  Flowsheets (Taken 6/9/2025 2035)  Care Plan - Patient's Chronic Conditions and Co-Morbidity Symptoms are Monitored and Maintained or Improved: Monitor and assess patient's chronic conditions and comorbid symptoms for stability, deterioration, or improvement  6/9/2025 0857 by Katherine Garza RN  Outcome: Progressing     Problem: Discharge Planning  Goal: Discharge to home or other facility with appropriate resources  6/9/2025 2134 by Sergio Viveros RN  Outcome: Progressing  Flowsheets (Taken 6/9/2025 2035)  Discharge to home or other facility with appropriate resources: Identify barriers to discharge with patient and caregiver  6/9/2025 0857 by Katherine Garza RN  Outcome: Progressing     Problem: Pain  Goal: Verbalizes/displays adequate comfort level or baseline comfort level  6/9/2025 2134 by Sergio Viveros RN  Outcome: Progressing  6/9/2025 0857 by Katherine Garza RN  Outcome: Progressing     Problem: Skin/Tissue Integrity  Goal: Skin integrity remains intact  Description: 1.  Monitor for areas of redness and/or skin breakdown2.  Assess vascular access sites hourly3.  Every 4-6 hours minimum:  Change oxygen saturation probe site4.  Every 4-6 hours:  If on nasal continuous positive airway pressure, respiratory therapy assess nares and determine need for appliance change or resting period  6/9/2025 2134 by Sergio Viveros RN  Outcome: Progressing  Flowsheets (Taken 6/9/2025 2035)  Skin Integrity Remains Intact: Monitor for areas of redness and/or skin breakdown  6/9/2025 0857 by Katherine Garza

## 2025-06-10 NOTE — PROGRESS NOTES
Spiritual Health History and Assessment/Progress Note  [facility]    Advance Care Planning,  ,  ,      Name: Malini Griggs MRN: [id number]    Age: 79 y.o.     Sex: female   Language: English   Episcopal: [employer]ar[employer]   Altered mental status     Date: 6/10/2025            Total Time Calculated: 31 min              Spiritual Assessment continued in [facility] MULTI-SPECIALTY ORTHOPEDICS 2        Referral/Consult From: Multi-disciplinary team   Encounter Overview/Reason: Advance Care Planning  Service Provided For: Patient not available    Sydnie, Belief, Meaning:   Patient identifies as spiritual and has beliefs or practices that help with coping during difficult times  Family/Friends No family/friends present      Importance and Influence:  Patient has spiritual/personal beliefs that influence decisions regarding their health  Family/Friends No family/friends present    Community:  Patient feels well-supported. Support system includes: Extended family  Family/Friends No family/friends present    Assessment and Plan of Care:   Consult for Advance Medical Directive (AMD)  Pt not awake nor responded to door knock and Chaplains voice.   After reviewing this mornings notes and consulting with attending RN, Spiritual Health will defer at this time. Once Pt is in a better place nurse or CM will resubmit consult.   Thank you       Patient Interventions include: Other: Pt asleep   Family/Friends Interventions include: No family/friends present    Patient Plan of Care: Spiritual Care available upon further referral  Family/Friends Plan of Care: No family/friends present    Electronically signed by CLAYTON Cain on 6/10/2025 at 10:32 AM  For  support, please call Spiritual Health Team @ [phone])

## 2025-06-10 NOTE — PROGRESS NOTES
[facility] Dosing Services: Antimicrobial Stewardship Daily Doc    Consult for antibiotic dosing of vancomycin by Dolly Bourgeois NP  Indication: Possible AV fistula infection in setting of ESRD on HD TTS  Day of Therapy: 4    Ht Readings from Last 1 Encounters:   06/06/25 1.549 m (5' 1\")        Wt Readings from Last 1 Encounters:   06/06/25 104.3 kg (230 lb)      Vancomycin therapy:    Maintenance dose: Vancomycin 1000 mg IV after each HD session  Dose calculated to approximate a           a. Target AUC/ROBERT of 400-600          b. Trough of 15-20 mcg/mL   Assessment/Plan:  Plan for HD today (currently underway), no new labs today, remains afebrile, no urine output documented if there is any, cultures are in process and ID is following  Vancomycin level collected on 6/10 at 1700 = 18 mcg/mL and represents a pre-HD level. Level is within 15-20 mcg/mL range to continue current dose. Plan to order vancomycin 1000 mg IV x 1 dose later today once we receive the in-basket message that dialysis session is completed.  Dose administration notes: Doses given appropriately as scheduled    Other Antimicrobial   (not dosed by pharmacist) Cefepime 1 g IV every 24 hours   Cultures 6/6 - Blood x 2 - NGTD x 4 days - Prelim  6/7 - MRSA, nares - Not Present - FINAL  6/7 - Wound  -  NGTD x 2 days - Prelim   Serum Creatinine Lab Results   Component Value Date/Time    CREATININE 10.80 06/10/2025 05:00 PM          Creatinine Clearance Estimated Creatinine Clearance: 5 mL/min (A) (based on SCr of 10.8 mg/dL (H)).     Temp Temp: 98.4 °F (36.9 °C)       WBC Lab Results   Component Value Date/Time    WBC 15.8 06/10/2025 05:00 PM          Procalcitonin Lab Results   Component Value Date/Time    PROCAL 2.92 06/08/2025 08:16 AM      For Antifungals, Metronidazole and Nafcillin: Lab Results   Component Value Date/Time    ALT <6 06/06/2025 06:05 AM    AST 14 06/06/2025 06:05 AM        Pharmacist: Angel Jones, [facility]  [phone]

## 2025-06-10 NOTE — PLAN OF CARE
0700 patient was transported to suite for HD tx but patient is more confused and screaming inside dialysis suite. This nurse spoke to primary nurse that  patient will have her HD later today at bedside.

## 2025-06-10 NOTE — PROGRESS NOTES
Physician Progress Note      PATIENT:               BARRETT WEBER  CSN #:                  [id number]  :                       1945  ADMIT DATE:       2025 2:20 PM  DISCH DATE:  RESPONDING  PROVIDER #:        Jessica Crespo MD          QUERY TEXT:    A mental status change is documented in the medical record MD PN by SAVAGE Mera ACNP on 25 .  Please specify the underlying cause:    The clinical indicators include:  78 yo female admitted with Altered mental status     MD PN by SAVAGE Mera ACNP:  # Altered mental status likely syncope  -CT head negative  -possibly extra somnolence may be due to hydrocodone and Benadryl taken prior   to dialysis  -avoid narcotic medications.  - Not clear if she was hypotensive during Dialysis.     H&P:  1. Altered mental status/syncope per history provided.  History is not clear   at this time.  The patient is back to normal mentation.  Will be admitted for   observation.  Extra somnolence may be due to hydrocodone and Benadryl, which   she took this morning.     MD PN by SAVAGE Flanagan MD:  1) Infected RUE AV fistula: sepsis POA, now improving.  2) Hypotension: occurred after dialysis, now improving.  Options provided:  -- Drug-induced encephalopathy related to hydrocodone and Benadryl  -- Metabolic encephalopathy  -- Septic encephalopathy  -- Other - I will add my own diagnosis  -- Disagree - Not applicable / Not valid  -- Disagree - Clinically unable to determine / Unknown  -- Refer to Clinical Documentation Reviewer    PROVIDER RESPONSE TEXT:    This patient has metabolic encephalopathy.    Query created by: Pham Yi on 6/10/2025 8:33 AM      Electronically signed by:  Jessica Crespo MD 6/10/2025 12:45 PM

## 2025-06-10 NOTE — PROGRESS NOTES
Vascular Surgery Progress Note    Admit Date: 2025  POD:  * No surgery found *  Procedure:      Subjective:     Ms. Griggs was seen resting in bed, NO family at bedside. Mild confusion. Reports her right upper arm is sore. Discussed with nursing no acute issues.     Objective:     Vitals:  Blood pressure 130/62, pulse 90, temperature 98.6 °F (37 °C), temperature source Axillary, resp. rate 16, height 1.549 m (5' 1\"), weight 104.3 kg (230 lb), SpO2 93%.  Temp (24hrs), Av.9 °F (37.2 °C), Min:97.9 °F (36.6 °C), Max:99.3 °F (37.4 °C)      Assessment/Plan     ESRD   - she is s/p R AVG 24 by Dr. Rashid at [facility]. Evaluated by Dr. Rashid and Dr. Ahmadi this admission.   - She continues with diffuse arm edema and mild erythema to the arm. There is no purulent drainage and staples are intact without dehiscence. There is a palpable thrill.   - Recommend elevating when in bed. Cover axillary incision for guaze to keep dry. Can also utilize interDry in axillary skin folds to help wick moisture.   - Staples to come out 2 weeks post op. [facility] with Dr. Rashid or other surgeon at that time.

## 2025-06-10 NOTE — PROGRESS NOTES
DANE HENSON [facility]  [address] [place] [address] [phone]        Hospitalist Progress Note      NAME: Malini Griggs   :  1945  MRM:  [id number]    Date/Time of service: 6/10/2025  11:44 AM       Subjective:     Chief Complaint:  Patient was personally seen and examined by me during this time period.  Chart reviewed.   Was agitated this morning in dialysis unit, and was sent back to her room. At this time she appears calm, but still confused.       Objective:       Vitals:       Last 24hrs VS reviewed since prior progress note. Most recent are:    Vitals:    06/10/25 1108   BP: 130/62   Pulse: 90   Resp: 16   Temp: 98.6 °F (37 °C)   SpO2: 93%     SpO2 Readings from Last 6 Encounters:   06/10/25 93%   23 96%   23 99%        No intake or output data in the 24 hours ending 06/10/25 1144       Exam:     Physical Exam:    Gen:  elderly, obese, NAD  HEENT:  Pink conjunctivae, PERRL, hearing intact to voice, moist mucous membranes  Neck:  Supple, without masses, thyroid non-tender  Resp:  No accessory muscle use, clear breath sounds without wheezes rales or rhonchi  Card:  No murmurs, normal S1, S2 without thrills, bruits or peripheral edema  Abd:  Soft, non-tender, non-distended, normoactive bowel sounds are present  Musc:  No cyanosis or clubbing  Skin:  RUE w/ erythema, swelling, pain.  No drainage   Neuro:  Cranial nerves 3-12 are grossly intact, follows commands appropriately  Psych:  poor insight, oriented to person, place and time, alert    Medications Reviewed: (see below)    Lab Data Reviewed: (see below)    ______________________________________________________________________    Medications:     Current Facility-Administered Medications   Medication Dose Route Frequency    [Held by provider] insulin glargine (LANTUS) injection vial 10 Units  10 Units SubCUTAneous Nightly    Vancomycin - Please draw random level with AM labs on 6/10. Thanks!   Other  Once    HYDROmorphone (DILAUDID) injection 0.5 mg  0.5 mg IntraVENous Q4H PRN    traMADol (ULTRAM) tablet 50 mg  50 mg Oral Q6H PRN    insulin lispro (HUMALOG,ADMELOG) injection vial 0-4 Units  0-4 Units SubCUTAneous 4x Daily AC & HS    glucose chewable tablet 16 g  4 tablet Oral PRN    dextrose bolus 10% 125 mL  125 mL IntraVENous PRN    Or    dextrose bolus 10% 250 mL  250 mL IntraVENous PRN    glucagon injection 1 mg  1 mg SubCUTAneous PRN    dextrose 10 % infusion   IntraVENous Continuous PRN    carvedilol (COREG) tablet 6.25 mg  6.25 mg Oral BID    cefepime (MAXIPIME) 1,000 mg in sodium chloride 0.9 % 50 mL IVPB (addEASE)  1,000 mg IntraVENous Q24H    Vancomycin: pharmacy dosing after each HD session   Other RX Placeholder    epoetin jay-epbx (RETACRIT) injection 3,000 Units  3,000 Units SubCUTAneous Once per day on Tuesday Thursday Saturday    ferrous sulfate (IRON 325) tablet 325 mg  325 mg Oral QHS    sevelamer (RENVELA) tablet 800 mg  800 mg Oral TID WC    docusate sodium (COLACE) capsule 100 mg  100 mg Oral Every Other Day    sodium chloride flush 0.9 % injection 5-40 mL  5-40 mL IntraVENous 2 times per day    sodium chloride flush 0.9 % injection 5-40 mL  5-40 mL IntraVENous PRN    0.9 % sodium chloride infusion   IntraVENous PRN    ondansetron (ZOFRAN-ODT) disintegrating tablet 4 mg  4 mg Oral Q8H PRN    Or    ondansetron (ZOFRAN) injection 4 mg  4 mg IntraVENous Q6H PRN    polyethylene glycol (GLYCOLAX) packet 17 g  17 g Oral Daily PRN    acetaminophen (TYLENOL) tablet 650 mg  650 mg Oral Q6H PRN    Or    acetaminophen (TYLENOL) suppository 650 mg  650 mg Rectal Q6H PRN    DULoxetine (CYMBALTA) extended release capsule 60 mg  60 mg Oral BID    apixaban (ELIQUIS) tablet 5 mg  5 mg Oral BID    gabapentin (NEURONTIN) capsule 100 mg  100 mg Oral TID    levothyroxine (SYNTHROID) tablet 75 mcg  75 mcg Oral QAM AC    losartan (COZAAR) tablet 25 mg  25 mg Oral Daily    rosuvastatin (CRESTOR) tablet 5 mg  5 mg

## 2025-06-10 NOTE — FLOWSHEET NOTE
Pre Dialysis   06/10/25 1645   Treatment   Treatment Goal 3L in 3.25hrs   Observations & Evaluations   Level of Consciousness 0   Oriented X   (pt is confused and talks to herself but not comabative)   Heart Rhythm Regular   Respiratory Quality/Effort Unlabored   O2 Device None (Room air)   Bilateral Breath Sounds Clear   Skin Condition/Temp Dry;Warm   Abdomen Inspection Soft;Obese   Edema Right upper extremity   RUE Edema +2   Vital Signs   /69   Temp 98.4 °F (36.9 °C)   Pulse 96   Respirations 18   SpO2 96 %   Pain Assessment   Pain Assessment Alves-Mera FACES   Pain Level 0   Hemodialysis Central Access - Permanent/Tunneled Right Subclavian   Placement Date/Time: 06/06/25 0826   Present on Admission/Arrival: Yes  Orientation: Right  Access Location: Subclavian   Continued need for line? Yes   Site Assessment Clean, dry & intact   Blue Lumen Status Alcohol cap present;Brisk blood return;Flushed   Red Lumen Status Alcohol cap present;Brisk blood return;Flushed   Line Care Connections checked and tightened;Chlorhexidine wipes;Ports disinfected   Dressing Type Antimicrobial;Sterile dressing, transparent   Date of Last Dressing Change 06/06/25   Dressing Status Clean, dry & intact   Technical Checks   Dialysis Machine No. 01   RO Machine Number [id number]   Dialyzer Lot No. [id number]   Tubing Lot Number [id number]   All Connections Secure Yes   NS Bag Yes   Saline Line Double Clamped Yes   Dialyzer Nipro ELISIO   Prime Volume (mL) 200 mL   ICEBOAT I;C;E;B;O;A;T   RO Machine Log Sheet Completed Yes   Machine Alarm Self Test Completed;Passed   Air Foam Detector Tested;Proper Function;pH Reading   Extracorporeal Circuit Tested for Integrity Yes   Machine Conductivity 13.8   Manual Ph 7.4   Bleach Test (Neg) Yes   Bath Temperature 98.6 °F (37 °C)   Dialysis Bath   K+ (Potassium) 3   Ca+ (Calcium) 2.5   Na+ (Sodium) 138   HCO3 (Bicarb) 35   Bicarbonate Concentrate Lot No. [id number]   Acid Concentrate Lot No. [id number]

## 2025-06-10 NOTE — PROGRESS NOTES
Occupational/ Physical Therapy: defer    Attempted OT/PT.  Patient off the floor for HD.  Will defer therapy at this time.    Pa Loera, OTR/L

## 2025-06-10 NOTE — PLAN OF CARE
Problem: Safety - Adult  Goal: Free from fall injury  6/10/2025 1030 by Andres Marie RN  Outcome: Progressing  6/9/2025 2134 by Sergio Viveros RN  Outcome: Progressing     Problem: Chronic Conditions and Co-morbidities  Goal: Patient's chronic conditions and co-morbidity symptoms are monitored and maintained or improved  6/10/2025 1030 by Andres Marie RN  Outcome: Progressing  6/9/2025 2134 by Sergio Viveros RN  Outcome: Progressing  Flowsheets (Taken 6/9/2025 2035)  Care Plan - Patient's Chronic Conditions and Co-Morbidity Symptoms are Monitored and Maintained or Improved: Monitor and assess patient's chronic conditions and comorbid symptoms for stability, deterioration, or improvement     Problem: Discharge Planning  Goal: Discharge to home or other facility with appropriate resources  6/10/2025 1030 by Andres Marie RN  Outcome: Progressing  6/9/2025 2134 by Sergio Viveros RN  Outcome: Progressing  Flowsheets (Taken 6/9/2025 2035)  Discharge to home or other facility with appropriate resources: Identify barriers to discharge with patient and caregiver     Problem: Pain  Goal: Verbalizes/displays adequate comfort level or baseline comfort level  6/10/2025 1030 by Andres Marie RN  Outcome: Progressing  6/9/2025 2134 by Sergio Viveros RN  Outcome: Progressing     Problem: Skin/Tissue Integrity  Goal: Skin integrity remains intact  Description: 1.  Monitor for areas of redness and/or skin breakdown2.  Assess vascular access sites hourly3.  Every 4-6 hours minimum:  Change oxygen saturation probe site4.  Every 4-6 hours:  If on nasal continuous positive airway pressure, respiratory therapy assess nares and determine need for appliance change or resting period  6/10/2025 1030 by Andres Marie RN  Outcome: Progressing  6/9/2025 2134 by Sergio Viveros RN  Outcome: Progressing  Flowsheets (Taken 6/9/2025 2035)  Skin Integrity Remains Intact: Monitor for areas of redness and/or

## 2025-06-10 NOTE — PROGRESS NOTES
DANE HENSON [facility]    Malini Griggs  YOB: 1945          Assessment & Plan:     ESRD on HD TTS at [facility]  AMS  New RUE AVF with arm edema  DM2    Rec:  HD today and TTS  On ABX per ID       Subjective:   CC: ESRD  HPI: Came to HD suite but was agitated and confused so sent back to her room and we will run 1:1 in the room.  Current Facility-Administered Medications   Medication Dose Route Frequency    [Held by provider] insulin glargine (LANTUS) injection vial 10 Units  10 Units SubCUTAneous Nightly    Vancomycin - Please draw random level with AM labs on 6/10. Thanks!   Other Once    HYDROmorphone (DILAUDID) injection 0.5 mg  0.5 mg IntraVENous Q4H PRN    traMADol (ULTRAM) tablet 50 mg  50 mg Oral Q6H PRN    insulin lispro (HUMALOG,ADMELOG) injection vial 0-4 Units  0-4 Units SubCUTAneous 4x Daily AC & HS    glucose chewable tablet 16 g  4 tablet Oral PRN    dextrose bolus 10% 125 mL  125 mL IntraVENous PRN    Or    dextrose bolus 10% 250 mL  250 mL IntraVENous PRN    glucagon injection 1 mg  1 mg SubCUTAneous PRN    dextrose 10 % infusion   IntraVENous Continuous PRN    carvedilol (COREG) tablet 6.25 mg  6.25 mg Oral BID    cefepime (MAXIPIME) 1,000 mg in sodium chloride 0.9 % 50 mL IVPB (addEASE)  1,000 mg IntraVENous Q24H    Vancomycin: pharmacy dosing after each HD session   Other RX Placeholder    epoetin jay-epbx (RETACRIT) injection 3,000 Units  3,000 Units SubCUTAneous Once per day on Tuesday Thursday Saturday    ferrous sulfate (IRON 325) tablet 325 mg  325 mg Oral QHS    sevelamer (RENVELA) tablet 800 mg  800 mg Oral TID     docusate sodium (COLACE) capsule 100 mg  100 mg Oral Every Other Day    sodium chloride flush 0.9 % injection 5-40 mL  5-40 mL IntraVENous 2 times per day    sodium chloride flush 0.9 % injection 5-40 mL  5-40 mL IntraVENous PRN    0.9 % sodium chloride infusion   IntraVENous PRN    ondansetron (ZOFRAN-ODT)

## 2025-06-11 PROBLEM — D72.829 LEUKOCYTOSIS: Status: ACTIVE | Noted: 2025-06-11

## 2025-06-11 LAB
ANION GAP SERPL CALC-SCNC: 17 MMOL/L (ref 2–12)
ARTERIAL PATENCY WRIST A: POSITIVE
BASE DEFICIT BLD-SCNC: 2.6 MMOL/L
BASOPHILS # BLD: 0 K/UL (ref 0–0.1)
BASOPHILS NFR BLD: 0 % (ref 0–1)
BDY SITE: ABNORMAL
BUN SERPL-MCNC: 39 MG/DL (ref 6–20)
BUN/CREAT SERPL: 5 (ref 12–20)
CALCIUM SERPL-MCNC: 9.7 MG/DL (ref 8.5–10.1)
CHLORIDE SERPL-SCNC: 97 MMOL/L (ref 97–108)
CO2 SERPL-SCNC: 20 MMOL/L (ref 21–32)
CREAT SERPL-MCNC: 7.49 MG/DL (ref 0.55–1.02)
DIFFERENTIAL METHOD BLD: ABNORMAL
EOSINOPHIL # BLD: 0.69 K/UL (ref 0–0.4)
EOSINOPHIL NFR BLD: 5 % (ref 0–7)
ERYTHROCYTE [DISTWIDTH] IN BLOOD BY AUTOMATED COUNT: 14.6 % (ref 11.5–14.5)
GAS FLOW.O2 O2 DELIVERY SYS: ABNORMAL
GLUCOSE BLD STRIP.AUTO-MCNC: 150 MG/DL (ref 65–117)
GLUCOSE BLD STRIP.AUTO-MCNC: 171 MG/DL (ref 65–117)
GLUCOSE BLD STRIP.AUTO-MCNC: 175 MG/DL (ref 65–117)
GLUCOSE BLD STRIP.AUTO-MCNC: 181 MG/DL (ref 65–117)
GLUCOSE BLD STRIP.AUTO-MCNC: 183 MG/DL (ref 65–117)
GLUCOSE SERPL-MCNC: 162 MG/DL (ref 65–100)
HCO3 BLD-SCNC: 20.8 MMOL/L (ref 21–28)
HCT VFR BLD AUTO: 29.8 % (ref 35–47)
HGB BLD-MCNC: 9.2 G/DL (ref 11.5–16)
IMM GRANULOCYTES # BLD AUTO: 0 K/UL (ref 0–0.04)
IMM GRANULOCYTES NFR BLD AUTO: 0 % (ref 0–0.5)
LYMPHOCYTES # BLD: 0.69 K/UL (ref 0.8–3.5)
LYMPHOCYTES NFR BLD: 5 % (ref 12–49)
MCH RBC QN AUTO: 27.7 PG (ref 26–34)
MCHC RBC AUTO-ENTMCNC: 30.9 G/DL (ref 30–36.5)
MCV RBC AUTO: 89.8 FL (ref 80–99)
MONOCYTES # BLD: 1.52 K/UL (ref 0–1)
MONOCYTES NFR BLD: 11 % (ref 5–13)
NEUTS BAND NFR BLD MANUAL: 1 %
NEUTS SEG # BLD: 10.9 K/UL (ref 1.8–8)
NEUTS SEG NFR BLD: 78 % (ref 32–75)
NRBC # BLD: 0 K/UL (ref 0–0.01)
NRBC BLD-RTO: 0 PER 100 WBC
O2/TOTAL GAS SETTING VFR VENT: 21 %
PCO2 BLD: 29.9 MMHG (ref 35–48)
PH BLD: 7.45 (ref 7.35–7.45)
PLATELET # BLD AUTO: 298 K/UL (ref 150–400)
PMV BLD AUTO: 9.6 FL (ref 8.9–12.9)
PO2 BLD: 67 MMHG (ref 83–108)
POTASSIUM SERPL-SCNC: 5.7 MMOL/L (ref 3.5–5.1)
RBC # BLD AUTO: 3.32 M/UL (ref 3.8–5.2)
RBC MORPH BLD: ABNORMAL
SAO2 % BLD: 94.3 % (ref 92–97)
SERVICE CMNT-IMP: ABNORMAL
SODIUM SERPL-SCNC: 134 MMOL/L (ref 136–145)
SPECIMEN TYPE: ABNORMAL
WBC # BLD AUTO: 13.8 K/UL (ref 3.6–11)

## 2025-06-11 PROCEDURE — 36415 COLL VENOUS BLD VENIPUNCTURE: CPT

## 2025-06-11 PROCEDURE — 2500000003 HC RX 250 WO HCPCS: Performed by: HOSPITALIST

## 2025-06-11 PROCEDURE — 85025 COMPLETE CBC W/AUTO DIFF WBC: CPT

## 2025-06-11 PROCEDURE — 93005 ELECTROCARDIOGRAM TRACING: CPT | Performed by: STUDENT IN AN ORGANIZED HEALTH CARE EDUCATION/TRAINING PROGRAM

## 2025-06-11 PROCEDURE — 6360000002 HC RX W HCPCS

## 2025-06-11 PROCEDURE — 82962 GLUCOSE BLOOD TEST: CPT

## 2025-06-11 PROCEDURE — 2060000000 HC ICU INTERMEDIATE R&B

## 2025-06-11 PROCEDURE — 6360000002 HC RX W HCPCS: Performed by: STUDENT IN AN ORGANIZED HEALTH CARE EDUCATION/TRAINING PROGRAM

## 2025-06-11 PROCEDURE — 80048 BASIC METABOLIC PNL TOTAL CA: CPT

## 2025-06-11 PROCEDURE — 2580000003 HC RX 258

## 2025-06-11 PROCEDURE — 94761 N-INVAS EAR/PLS OXIMETRY MLT: CPT

## 2025-06-11 PROCEDURE — 82803 BLOOD GASES ANY COMBINATION: CPT

## 2025-06-11 PROCEDURE — 36600 WITHDRAWAL OF ARTERIAL BLOOD: CPT

## 2025-06-11 PROCEDURE — 36556 INSERT NON-TUNNEL CV CATH: CPT

## 2025-06-11 PROCEDURE — 6370000000 HC RX 637 (ALT 250 FOR IP): Performed by: INTERNAL MEDICINE

## 2025-06-11 PROCEDURE — 99233 SBSQ HOSP IP/OBS HIGH 50: CPT | Performed by: INTERNAL MEDICINE

## 2025-06-11 PROCEDURE — 6360000002 HC RX W HCPCS: Performed by: NURSE PRACTITIONER

## 2025-06-11 PROCEDURE — 6360000002 HC RX W HCPCS: Performed by: INTERNAL MEDICINE

## 2025-06-11 RX ORDER — DIAZEPAM 10 MG/2ML
10 INJECTION, SOLUTION INTRAMUSCULAR; INTRAVENOUS ONCE
Status: COMPLETED | OUTPATIENT
Start: 2025-06-11 | End: 2025-06-11

## 2025-06-11 RX ORDER — DIAZEPAM 10 MG/2ML
5 INJECTION, SOLUTION INTRAMUSCULAR; INTRAVENOUS EVERY 6 HOURS PRN
Status: DISCONTINUED | OUTPATIENT
Start: 2025-06-11 | End: 2025-06-19 | Stop reason: HOSPADM

## 2025-06-11 RX ORDER — ZIPRASIDONE MESYLATE 20 MG/ML
10 INJECTION, POWDER, LYOPHILIZED, FOR SOLUTION INTRAMUSCULAR ONCE
Status: COMPLETED | OUTPATIENT
Start: 2025-06-11 | End: 2025-06-11

## 2025-06-11 RX ADMIN — SODIUM CHLORIDE, PRESERVATIVE FREE 10 ML: 5 INJECTION INTRAVENOUS at 21:12

## 2025-06-11 RX ADMIN — INSULIN LISPRO 1 UNITS: 100 INJECTION, SOLUTION INTRAVENOUS; SUBCUTANEOUS at 16:14

## 2025-06-11 RX ADMIN — SODIUM CHLORIDE, PRESERVATIVE FREE 10 ML: 5 INJECTION INTRAVENOUS at 08:08

## 2025-06-11 RX ADMIN — HYDROMORPHONE HYDROCHLORIDE 0.5 MG: 1 INJECTION, SOLUTION INTRAMUSCULAR; INTRAVENOUS; SUBCUTANEOUS at 02:42

## 2025-06-11 RX ADMIN — ZIPRASIDONE MESYLATE 10 MG: 20 INJECTION, POWDER, LYOPHILIZED, FOR SOLUTION INTRAMUSCULAR at 09:52

## 2025-06-11 RX ADMIN — DIAZEPAM 10 MG: 5 INJECTION, SOLUTION INTRAMUSCULAR; INTRAVENOUS at 21:33

## 2025-06-11 RX ADMIN — DIAZEPAM 5 MG: 5 INJECTION, SOLUTION INTRAMUSCULAR; INTRAVENOUS at 20:02

## 2025-06-11 RX ADMIN — CEFEPIME 1000 MG: 1 INJECTION, POWDER, FOR SOLUTION INTRAMUSCULAR; INTRAVENOUS at 02:34

## 2025-06-11 ASSESSMENT — PAIN SCALES - PAIN ASSESSMENT IN ADVANCED DEMENTIA (PAINAD)
NEGVOCALIZATION: REPEATED TROUBLED CALLING OUT, LOUD MOANING/GROANING, CRYING
BREATHING: OCCASIONAL LABORED BREATHING, SHORT PERIOD OF HYPERVENTILATION
FACIALEXPRESSION: SMILING OR INEXPRESSIVE
CONSOLABILITY: NO NEED TO CONSOLE
FACIALEXPRESSION: SAD, FRIGHTENED, FROWN
BREATHING: OCCASIONAL LABORED BREATHING, SHORT PERIOD OF HYPERVENTILATION
TOTALSCORE: 5
TOTALSCORE: 0
TOTALSCORE: 0
CONSOLABILITY: NO NEED TO CONSOLE
BREATHING: NORMAL
NEGVOCALIZATION: OCCASIONAL MOAN/GROAN, LOW SPEECH, NEGATIVE/DISAPPROVING QUALITY
BODYLANGUAGE: TENSE, DISTRESSED PACING, FIDGETING
CONSOLABILITY: DISTRACTED OR REASSURED BY VOICE/TOUCH
FACIALEXPRESSION: SMILING OR INEXPRESSIVE
TOTALSCORE: 4
CONSOLABILITY: DISTRACTED OR REASSURED BY VOICE/TOUCH
CONSOLABILITY: NO NEED TO CONSOLE
BODYLANGUAGE: TENSE, DISTRESSED PACING, FIDGETING
TOTALSCORE: 0
BODYLANGUAGE: RELAXED
FACIALEXPRESSION: SMILING OR INEXPRESSIVE
BREATHING: NORMAL
CONSOLABILITY: UNABLE TO CONSOLE, DISTRACT OR REASSURE
NEGVOCALIZATION: REPEATED TROUBLED CALLING OUT, LOUD MOANING/GROANING, CRYING
TOTALSCORE: 6
BODYLANGUAGE: RELAXED
BODYLANGUAGE: TENSE, DISTRESSED PACING, FIDGETING
BODYLANGUAGE: RELAXED
FACIALEXPRESSION: SMILING OR INEXPRESSIVE
FACIALEXPRESSION: SMILING OR INEXPRESSIVE

## 2025-06-11 ASSESSMENT — PAIN SCALES - GENERAL
PAINLEVEL_OUTOF10: 0
PAINLEVEL_OUTOF10: 0

## 2025-06-11 NOTE — PROGRESS NOTES
DANE HENSON [facility]    Malini Griggs  YOB: 1945          Assessment & Plan:     ESRD on HD TTS at [facility]  AMS  New RUE AVF with arm edema  DM2    Rec:  HD yesterday. Had to run in room due to agitation and AMS  HD TTS  On ABX per ID       Subjective:   CC: ESRD  HPI: HD yesterday. Remains very encephalopathic today  Current Facility-Administered Medications   Medication Dose Route Frequency    diazePAM (VALIUM) injection 5 mg  5 mg IntraVENous Q6H PRN    albumin human 25% IV solution 12.5 g  12.5 g IntraVENous PRN    traMADol (ULTRAM) tablet 50 mg  50 mg Oral Q12H PRN    [Held by provider] insulin glargine (LANTUS) injection vial 10 Units  10 Units SubCUTAneous Nightly    [Held by provider] HYDROmorphone (DILAUDID) injection 0.5 mg  0.5 mg IntraVENous Q4H PRN    insulin lispro (HUMALOG,ADMELOG) injection vial 0-4 Units  0-4 Units SubCUTAneous 4x Daily AC & HS    glucose chewable tablet 16 g  4 tablet Oral PRN    dextrose bolus 10% 125 mL  125 mL IntraVENous PRN    Or    dextrose bolus 10% 250 mL  250 mL IntraVENous PRN    glucagon injection 1 mg  1 mg SubCUTAneous PRN    dextrose 10 % infusion   IntraVENous Continuous PRN    carvedilol (COREG) tablet 6.25 mg  6.25 mg Oral BID    epoetin jay-epbx (RETACRIT) injection 3,000 Units  3,000 Units SubCUTAneous Once per day on Tuesday Thursday Saturday    ferrous sulfate (IRON 325) tablet 325 mg  325 mg Oral QHS    sevelamer (RENVELA) tablet 800 mg  800 mg Oral TID     docusate sodium (COLACE) capsule 100 mg  100 mg Oral Every Other Day    sodium chloride flush 0.9 % injection 5-40 mL  5-40 mL IntraVENous 2 times per day    sodium chloride flush 0.9 % injection 5-40 mL  5-40 mL IntraVENous PRN    0.9 % sodium chloride infusion   IntraVENous PRN    ondansetron (ZOFRAN-ODT) disintegrating tablet 4 mg  4 mg Oral Q8H PRN    Or    ondansetron (ZOFRAN) injection 4 mg  4 mg IntraVENous Q6H PRN    polyethylene  glycol (GLYCOLAX) packet 17 g  17 g Oral Daily PRN    acetaminophen (TYLENOL) tablet 650 mg  650 mg Oral Q6H PRN    Or    acetaminophen (TYLENOL) suppository 650 mg  650 mg Rectal Q6H PRN    DULoxetine (CYMBALTA) extended release capsule 60 mg  60 mg Oral BID    apixaban (ELIQUIS) tablet 5 mg  5 mg Oral BID    gabapentin (NEURONTIN) capsule 100 mg  100 mg Oral TID    levothyroxine (SYNTHROID) tablet 75 mcg  75 mcg Oral QAM AC    losartan (COZAAR) tablet 25 mg  25 mg Oral Daily    rosuvastatin (CRESTOR) tablet 5 mg  5 mg Oral Nightly          Objective:     Vitals:  Blood pressure (!) 150/67, pulse (!) 105, temperature 97.9 °F (36.6 °C), temperature source Axillary, resp. rate 18, height 1.549 m (5' 1\"), weight 104.3 kg (230 lb), SpO2 95%.  Temp (24hrs), Av °F (36.7 °C), Min:97 °F (36.1 °C), Max:99 °F (37.2 °C)      Intake and Output:  No intake/output data recorded.   1901 -  0700  In: 850   Out: 1215     Physical Exam:               GENERAL ASSESSMENT: AMS  HEENT: Nontraumatic   NECK RIJ PC  CHEST: Clear  HEART: tachy  EXTREMITY: RUE EDEMA          ECG/rhythm:    Data Review        Recent Labs     06/10/25  1700 25  1022   * 134*   K 5.9* 5.7*   CL 95* 97   CO2 22 20*   BUN 57* 39*   CREATININE 10.80* 7.49*   GLUCOSE 158* 162*   CALCIUM 8.9 9.7           : Kim Baker MD  2025        [facility]:  [email or web address]  [email or web address]    [place] office:  [address], [address]  [place] [address]  Phone: [phone]  Fax :     [phone]    [place] office:  [address] [place] [address]  Phone - [phone]  Fax - [phone]

## 2025-06-11 NOTE — PROGRESS NOTES
Patient spitting water given prior to meds, agitated and confused. Patient hitting sitter and myself. MD Crespo at bedside at this time performing patient rounding. Discuss patient last CT of head, and last ammonia level. MD Crespo states is confusion getting worse will follow up tomorrow with CT of head. MD Crespo update patient family via phone at this time. Orders received to hold medication and will provide X 1 order Geodon for agitation. No further orders received. Also notify doctor RUE edema noted. Acknowledge information.

## 2025-06-11 NOTE — PROGRESS NOTES
[facility] Dosing Services:     Pharmacist Renal Dosing Progress Note for Zosyn   Physician Young    The following medication: Zosyn was automatically dose-adjusted per [facility] P&T Committee Protocol, with respect to renal function.      Consult provided for this   79 y.o. , female , for the indication of endocarditis.    Pt Weight:   Wt Readings from Last 1 Encounters:   06/06/25 104.3 kg (230 lb)         Previous Regimen Zosyn 2.25g q12h   Serum Creatinine Lab Results   Component Value Date/Time    CREATININE 10.80 06/10/2025 05:00 PM      Creatinine Clearance Estimated Creatinine Clearance: 5 mL/min (A) (based on SCr of 10.8 mg/dL (H)).   BUN Lab Results   Component Value Date/Time    BUN 57 06/10/2025 05:00 PM       Dosage changed to:  3.375g q12h    Additional notes: cefepime discontinued due to concerns over neurotoxicity. Dose administered cefepime 1g 6/11@0234. Zosyn LD omitted, start time adjusted to ~24h after cefepime dose.      Pharmacy to continue to monitor patient daily.   Will make dosage adjustments based upon changing renal function.  Signed Mike Herzog [facility]. Contact information:  [phone]

## 2025-06-11 NOTE — PROGRESS NOTES
Call to radiology [phone] notify regarding pending CT scan of head. Spoke with Balta CT tech. Per Balta, there is still pending patient on board and will call for patient went ready.

## 2025-06-11 NOTE — PROGRESS NOTES
Patient cpap on patient since patient falling asleep and snoring. Per sitter PCP patient took CPAP within 10 min after placement of CPAP.

## 2025-06-11 NOTE — PROGRESS NOTES
DANE HENSON [facility]  [address] [place] [address] [phone]        Hospitalist Progress Note      NAME: Malini Griggs   :  1945  MRM:  [id number]    Date/Time of service: 2025  11:06 AM       Subjective:     Chief Complaint:  Patient was personally seen and examined by me during this time period.  Chart reviewed.   Patient is still confused and agitated.  Sitter at bedside.        Objective:       Vitals:       Last 24hrs VS reviewed since prior progress note. Most recent are:    Vitals:    25 0742   BP: (!) 124/58   Pulse: (!) 101   Resp:    Temp:    SpO2: 96%     SpO2 Readings from Last 6 Encounters:   25 96%   23 96%   23 99%          Intake/Output Summary (Last 24 hours) at 2025 1106  Last data filed at 6/10/2025 1930  Gross per 24 hour   Intake 850 ml   Output 1215 ml   Net -365 ml          Exam:     Physical Exam:    Gen:  elderly, obese, NAD  HEENT:  Pink conjunctivae, PERRL, hearing intact to voice, moist mucous membranes  Neck:  Supple, without masses, thyroid non-tender  Resp:  No accessory muscle use, clear breath sounds without wheezes rales or rhonchi  Card:  No murmurs, normal S1, S2 without thrills, bruits or peripheral edema  Abd:  Soft, non-tender, non-distended, normoactive bowel sounds are present  Musc:  No cyanosis or clubbing  Skin:  RUE w/ erythema, swelling, pain.  No drainage   Neuro:  Cranial nerves 3-12 are grossly intact, follows commands appropriately  Psych: Confused, agitated    Medications Reviewed: (see below)    Lab Data Reviewed: (see below)    ______________________________________________________________________    Medications:     Current Facility-Administered Medications   Medication Dose Route Frequency    albumin human 25% IV solution 12.5 g  12.5 g IntraVENous PRN    traMADol (ULTRAM) tablet 50 mg  50 mg Oral Q12H PRN    [Held by provider] insulin glargine (LANTUS) injection vial 10 Units

## 2025-06-11 NOTE — PROGRESS NOTES
Call to CT scan ext [phone], spoke with CT scan tech notify pending CT of head. Per tech there is a lot of CT scans pending and will call went available to take patient.

## 2025-06-11 NOTE — PROGRESS NOTES
PT/OT note: Spoke to RN who stated pt is agitated and not appropriate for therapy right now. Will cont to follow.

## 2025-06-11 NOTE — PROGRESS NOTES
Physical Therapy    1415 pt confused and agitated earlier today.  received in bed, lethargic,  unable to open eyes or follow verbal cues. Pt decreased alertness with assisting RN transfer pt to different bed with total A. CT pending. PT will defer at this time and follow up once pt able to actively participate with therapy.

## 2025-06-11 NOTE — PROGRESS NOTES
Notify MD Crespo via secure message call had been place to CT and per CT scan not ready to take patient to CT scan. Report patient sleeping pretty drowsy and restless and not following up commands. Notify around 11:32 Patient fall asleep and attempt to place CPAP but patient remove within 10 min of placing mask. Notify MD patient has not been able to take PO medications or been feed secondary no improvement of GCS. MD Crespo states to hold on medications until patient more awake and able to take medications and will place orders to transfer to IMC. @14:45 MD Crespo at bedside states will place orders for consult to neurology and no need to change CT of head to STAT. No further orders received.

## 2025-06-11 NOTE — PROGRESS NOTES
Discussed with Dr Vidal  Not much role for antibiotics here  If the graft is infected it needs explant  If not then antibiotics unhelpful  Will make npo after midnight  Will reexamine in am and have a low threshold for explant if clinically still unclear.

## 2025-06-11 NOTE — PROGRESS NOTES
Infectious Disease Progress Note    Assessment / Plan:      79-year-old female with infection of right upper extremity AV graft surgical site    Cultures from AV graft site and NGSF  Imaging not thought to be helpful at this point per vascular surgery  Anticipate 6-week course of antibiotic therapy for endovascular infection  Patient currently on vancomycin and cefepime    AMS.  Possible neurotoxicity from cefepime.  Cefepime stopped and patient started on piperacillin-tazobactam    Leukocytosis.  WBC up to 15,000.  No obvious new infection.  Remains afebrile  Follow trend  Watch for diarrhea which may indicate C. difficile.  Patient at risk on broad-spectrum antibiotics    Amoxicillin and sulfa allergies.  Amoxicillin allergy is listed as \"ineffective\" Will challenge with piperacillin-tazobactam today    Discharge plans to be determined    Addendum.  Spoke with vascular surgery.  Low suspicion for graft infection.  Patient hemodynamically stable with negative graft and blood cultures.  If graft is infected would need explant.  Prolonged antibiotics would not be expected to cure infection but may mask infection and delay inevitable need for explantation if graft is infected.  Will monitor off antibiotics.         Reason for Consult: Fever and AV graft infection  Date of Consultation: June 11, 2025  Date of Admission: 6/5/2025  Referring Physician: Dr. Flanagan    HPI:    79-year-old female with DM, CHF, ESRD on HD now via right chest wall permacath, formerly by left upper extremity AV graft for which was placed in 2023 and now failed and status post 6/2 AV graft placement on right upper extremity at [facility].  Admitted on 6/5 for syncopal episode and sent from dialysis.  Workup largely benign although starting to develop redness swelling pain on right upper extremity postop and concern for right upper extremity AV graft infection.  Course of hospitalization complicated by fever to 100.9 on 6/6 and started on  vancomycin and cefepime empiric antibiotics.  Noted to have purulent drainage from 1 of surgical incisions although I do not see any at this time.  Patient has been seen by vascular surgery who recommends antibiotic therapy without further intervention currently.    Duplex ultrasound of right upper extremity appears patent no DVT.    6/9/2025.  No new complaints    Past Medical History:  Past Medical History:   Diagnosis Date    Arthritis     CHF (congestive heart failure) (HCC)     Diabetes mellitus (HCC)     Heart murmur     Hemodialysis patient     Hyperlipidemia     Hypertension     Kidney failure     MRSA (methicillin resistant Staphylococcus aureus)     Prolonged emergence from general anesthesia     Seizure (HCC)     AS A CHILD    Sleep apnea     USES CPAP    Thyroid disease     HYPOTHYROIDISM         Surgical History:  Past Surgical History:   Procedure Laterality Date    CHOLECYSTECTOMY  1972    DIALYSIS CATHETER INSERTION      R CHEST    DIALYSIS FISTULA CREATION Left 5/26/2023    LEFT UPPER ARM ARTERIOVENOUS GRAFT PLACEMENT (MAC W/REGIONAL) performed by Greg Honeycutt MD at [facility]    DILATION AND CURETTAGE OF UTERUS      PERITONEAL CATHETER INSERTION      PERITONEAL CATHETER REMOVAL           Family History:   Family History   Problem Relation Age of Onset    No Known Problems Mother     Diabetes Father     Hypertension Father     Heart Disease Father     Lupus Daughter     Anesth Problems Daughter         DIFFICULTY AWAKENING    Cancer Daughter     Pancreatic Cancer Son          Social History:     Living Situation:  Tobacco:  Alcohol:  Illicit Drugs:  Sexual History:   Travel History  Exposures:   Outdoor/Hiking: denied  Animal/Pet: Dogs/ Cats   Construction: denied  Hot Tub: denied   Brackish/stagnant exposure: denied  TB exposure: denied  Sick Contacts: denied     Allergies:  Allergies   Allergen Reactions    Iron Anaphylaxis     LIQUID IRON    Amoxicillin Other (See Comments)     ineffective

## 2025-06-12 ENCOUNTER — APPOINTMENT (OUTPATIENT)
Facility: HOSPITAL | Age: 80
DRG: 314 | End: 2025-06-12
Payer: MEDICARE

## 2025-06-12 PROBLEM — G93.40 ACUTE ENCEPHALOPATHY: Status: ACTIVE | Noted: 2025-06-12

## 2025-06-12 LAB
ANION GAP SERPL CALC-SCNC: 11 MMOL/L (ref 2–12)
BACTERIA SPEC CULT: NORMAL
BACTERIA SPEC CULT: NORMAL
BUN SERPL-MCNC: 52 MG/DL (ref 6–20)
BUN/CREAT SERPL: 6 (ref 12–20)
CALCIUM SERPL-MCNC: 9.8 MG/DL (ref 8.5–10.1)
CHLORIDE SERPL-SCNC: 99 MMOL/L (ref 97–108)
CO2 SERPL-SCNC: 22 MMOL/L (ref 21–32)
CREAT SERPL-MCNC: 9.02 MG/DL (ref 0.55–1.02)
EKG ATRIAL RATE: 100 BPM
EKG DIAGNOSIS: NORMAL
EKG P AXIS: 60 DEGREES
EKG P-R INTERVAL: 142 MS
EKG Q-T INTERVAL: 384 MS
EKG QRS DURATION: 126 MS
EKG QTC CALCULATION (BAZETT): 495 MS
EKG R AXIS: 84 DEGREES
EKG T AXIS: 40 DEGREES
EKG VENTRICULAR RATE: 100 BPM
GLUCOSE BLD STRIP.AUTO-MCNC: 107 MG/DL (ref 65–117)
GLUCOSE BLD STRIP.AUTO-MCNC: 152 MG/DL (ref 65–117)
GLUCOSE BLD STRIP.AUTO-MCNC: 168 MG/DL (ref 65–117)
GLUCOSE BLD STRIP.AUTO-MCNC: 169 MG/DL (ref 65–117)
GLUCOSE SERPL-MCNC: 159 MG/DL (ref 65–100)
HBV SURFACE AB SER QL: REACTIVE
HBV SURFACE AB SER-ACNC: >1000 MIU/ML
HBV SURFACE AG SER QL: <0.1 INDEX
HBV SURFACE AG SER QL: NEGATIVE
POTASSIUM SERPL-SCNC: 5.4 MMOL/L (ref 3.5–5.1)
SERVICE CMNT-IMP: ABNORMAL
SERVICE CMNT-IMP: NORMAL
SODIUM SERPL-SCNC: 132 MMOL/L (ref 136–145)

## 2025-06-12 PROCEDURE — 6360000002 HC RX W HCPCS: Performed by: NURSE PRACTITIONER

## 2025-06-12 PROCEDURE — 6360000002 HC RX W HCPCS: Performed by: INTERNAL MEDICINE

## 2025-06-12 PROCEDURE — 2060000000 HC ICU INTERMEDIATE R&B

## 2025-06-12 PROCEDURE — 80048 BASIC METABOLIC PNL TOTAL CA: CPT

## 2025-06-12 PROCEDURE — 87340 HEPATITIS B SURFACE AG IA: CPT

## 2025-06-12 PROCEDURE — 6360000002 HC RX W HCPCS: Performed by: STUDENT IN AN ORGANIZED HEALTH CARE EDUCATION/TRAINING PROGRAM

## 2025-06-12 PROCEDURE — 2500000003 HC RX 250 WO HCPCS

## 2025-06-12 PROCEDURE — 94761 N-INVAS EAR/PLS OXIMETRY MLT: CPT

## 2025-06-12 PROCEDURE — 86706 HEP B SURFACE ANTIBODY: CPT

## 2025-06-12 PROCEDURE — 6360000002 HC RX W HCPCS

## 2025-06-12 PROCEDURE — 99232 SBSQ HOSP IP/OBS MODERATE 35: CPT | Performed by: INTERNAL MEDICINE

## 2025-06-12 PROCEDURE — 90935 HEMODIALYSIS ONE EVALUATION: CPT

## 2025-06-12 PROCEDURE — 2700000000 HC OXYGEN THERAPY PER DAY

## 2025-06-12 PROCEDURE — 99223 1ST HOSP IP/OBS HIGH 75: CPT | Performed by: PSYCHIATRY & NEUROLOGY

## 2025-06-12 PROCEDURE — 82962 GLUCOSE BLOOD TEST: CPT

## 2025-06-12 PROCEDURE — 2500000003 HC RX 250 WO HCPCS: Performed by: HOSPITALIST

## 2025-06-12 PROCEDURE — 36415 COLL VENOUS BLD VENIPUNCTURE: CPT

## 2025-06-12 PROCEDURE — 93010 ELECTROCARDIOGRAM REPORT: CPT | Performed by: SPECIALIST

## 2025-06-12 PROCEDURE — 70450 CT HEAD/BRAIN W/O DYE: CPT

## 2025-06-12 RX ORDER — ZIPRASIDONE MESYLATE 20 MG/ML
10 INJECTION, POWDER, LYOPHILIZED, FOR SOLUTION INTRAMUSCULAR ONCE
Status: COMPLETED | OUTPATIENT
Start: 2025-06-12 | End: 2025-06-12

## 2025-06-12 RX ORDER — DULOXETIN HYDROCHLORIDE 30 MG/1
60 CAPSULE, DELAYED RELEASE ORAL DAILY
Status: DISCONTINUED | OUTPATIENT
Start: 2025-06-13 | End: 2025-06-19 | Stop reason: HOSPADM

## 2025-06-12 RX ORDER — DIAZEPAM 10 MG/2ML
10 INJECTION, SOLUTION INTRAMUSCULAR; INTRAVENOUS ONCE
Status: COMPLETED | OUTPATIENT
Start: 2025-06-12 | End: 2025-06-12

## 2025-06-12 RX ORDER — WATER 10 ML/10ML
INJECTION INTRAMUSCULAR; INTRAVENOUS; SUBCUTANEOUS
Status: COMPLETED
Start: 2025-06-12 | End: 2025-06-12

## 2025-06-12 RX ADMIN — DIAZEPAM 10 MG: 5 INJECTION, SOLUTION INTRAMUSCULAR; INTRAVENOUS at 22:19

## 2025-06-12 RX ADMIN — DIAZEPAM 5 MG: 5 INJECTION, SOLUTION INTRAMUSCULAR; INTRAVENOUS at 00:41

## 2025-06-12 RX ADMIN — WATER: 1 INJECTION INTRAMUSCULAR; INTRAVENOUS; SUBCUTANEOUS at 17:45

## 2025-06-12 RX ADMIN — ZIPRASIDONE MESYLATE 10 MG: 20 INJECTION, POWDER, LYOPHILIZED, FOR SOLUTION INTRAMUSCULAR at 17:43

## 2025-06-12 RX ADMIN — EPOETIN ALFA-EPBX 3000 UNITS: 4000 INJECTION, SOLUTION INTRAVENOUS; SUBCUTANEOUS at 18:30

## 2025-06-12 RX ADMIN — HYDROMORPHONE HYDROCHLORIDE 0.5 MG: 1 INJECTION, SOLUTION INTRAMUSCULAR; INTRAVENOUS; SUBCUTANEOUS at 16:19

## 2025-06-12 RX ADMIN — ZIPRASIDONE MESYLATE 10 MG: 20 INJECTION, POWDER, LYOPHILIZED, FOR SOLUTION INTRAMUSCULAR at 03:53

## 2025-06-12 RX ADMIN — SODIUM CHLORIDE, PRESERVATIVE FREE 10 ML: 5 INJECTION INTRAVENOUS at 21:20

## 2025-06-12 ASSESSMENT — PAIN SCALES - GENERAL
PAINLEVEL_OUTOF10: 0
PAINLEVEL_OUTOF10: 0

## 2025-06-12 NOTE — PROGRESS NOTES
Infectious Disease Progress Note    Assessment / Plan:      AV graft site edema and induration  Cultures from AV graft site and NGSF  Blood cultures sterile to date.  Low suspicion for graft infection per discussion with vascular surgery  Monitor off antibiotics.  If graft is infected it will need to be explanted.    AMS.  Possible neurotoxicity from cefepime.  Cefepime stopped 6/11    Leukocytosis.  New onset since admission.  Beginning to trend down.  No obvious new infection.  Remains afebrile  Follow trend  Watch for diarrhea which may indicate C. difficile.  Patient at risk on broad-spectrum antibiotics    Amoxicillin and sulfa allergies.  Amoxicillin allergy is listed as \"ineffective\"         Reason for Consult: Fever and AV graft infection  Date of Consultation: June 12, 2025  Date of Admission: 6/5/2025  Referring Physician: Dr. Flanagan    HPI:    79-year-old female with DM, CHF, ESRD on HD now via right chest wall permacath, formerly by left upper extremity AV graft for which was placed in 2023 and now failed and status post 6/2 AV graft placement on right upper extremity at [facility].  Admitted on 6/5 for syncopal episode and sent from dialysis.  Workup largely benign although starting to develop redness swelling pain on right upper extremity postop and concern for right upper extremity AV graft infection.  Course of hospitalization complicated by fever to 100.9 on 6/6 and started on vancomycin and cefepime empiric antibiotics.  Noted to have purulent drainage from 1 of surgical incisions although I do not see any at this time.  Patient has been seen by vascular surgery who recommends antibiotic therapy without further intervention currently.    Duplex ultrasound of right upper extremity appears patent no DVT.    6/9/2025.  No new complaints    6/12/2025.  Patient seen on dialysis.  Remains agitated.    Past Medical History:  Past Medical History:   Diagnosis Date    Arthritis     CHF (congestive

## 2025-06-12 NOTE — LETTER
[employer], an artificial intelligence clinical decision support software, has identified that Your patient, BARRETT WEBER (1945), meets the criteria for Moderate and/or Severe Aortic Stenosis based on the echo performed on 06/06/2025. Per* ACC/AHA's Valvular Heart disease guidelines, an intervention may be indicated. A referral requires a multifactorial decision outside the purview of the algorithm. For determining appropriate referral, please evaluate the patientâ€™s record.  If you find a referral is necessary and the patient does not already have a cardiologist, Brenda Marques, the [facility] Structural Heart Valve Coordinator will be happy to assist your office in connecting this patient with the [facility] multidisciplinary heart team for further evaluation. To do so, please either send an [employer] referral to Dr. Jeremi Talavera, an EPIC message to Brenda Marques or you can call [phone].  We appreciate your partnership in providing our patients the best cardiac care possible.  You are receiving this notification as part of a quality initiative using the power of artificial intelligence and the electronic health record to improve patient outcomes. Note: It is the clinicianâ€™s decision and ultimate responsibility whether to not refer if clinically indicated to do so. [employer] is intended for use by a qualified healthcare professional.

## 2025-06-12 NOTE — CARE COORDINATION
4:39 PM  06/12/25    Care Management Progress Note    Reason for Admission:   End stage renal disease (HCC) [N18.6]  Altered mental status [R41.82]  RBBB [I45.10]  Syncope, unspecified syncope type [R55]  SIRS (systemic inflammatory response syndrome) (HCC) [R65.10]  Procedure(s) (LRB):  EXPLORATION RIGHT ARM (Right)       Patient Admission Status: Inpatient  Date Admitted to INP:  6/7  RUR: Readmission Risk Score: 21.5    Hospitalization in the last 30 days (Readmission):  No        Transition of care plan:  Pt discussed during IDR- ID, nephrology and therapy following. Pt has 1:1 sitter at bedside.  Anticipated discharge plan: CM met face to face with pt- she remains confused/agitated and was not able to provide history/answer questions appropriately. No family at bedside.  Date IM given: Per registration, 3 attempts made and IM letter mailed to pt's home address.  Outpatient follow-up.  Discharge transport: TBD      CM called pt's granddaughter Linette to provide update; she verbalized understanding. CM will follow.    Referral sent to [facility]; they are following for medical clearance.    AMRITA [place]

## 2025-06-12 NOTE — PROGRESS NOTES
Comprehensive Nutrition Assessment    Type and Reason for Visit: Initial, LOS    Nutrition Recommendations/Plan:     Continue Regular diet at this time    If mentation does not improve enough for pt to consume adequate PO, will need NGT placement for EN         Malnutrition Assessment:  Malnutrition Status:  Insufficient data (06/12/25 1358)         Nutrition Assessment:    78 yo female admitted for confusion.  Pmhx: ESRD on HD, DM, CHF, HLD, hypothyroid.      Seeing pt for LOS.  Noted to be confused with sitter in room.  Attempted to visit with pt, being cleaned up during visit but spoke with RN who states pt has been too altered to eat yesterday and today so 0% PO intakes.  MD notes possible encephalopathy from cefepime which has been stopped.  Initial CT of head unremarkable.  Repeat CT of head pending.  Neurology consulted.  Weight hx in EMR shows weight gain of 20 lbs from last weight in 2023.    Labs: K+ 5.4, BG elevated, HgbA1c 5.9, phos 5.1  Will leave diet as Regular until PO intakes improve then pt may need K+ and/or Phos restriction.  If mentation does not improve enough for pt to consume adequate PO, will need NGT placement for EN.       Nutritionally Significant Medications:  Colace, Epo, FeSu, Humalog, Synthroid, Renvela      Estimated Daily Nutrient Needs:  Energy Requirements Based On: Formula  Weight Used for Energy Requirements: Admission  Energy (kcal/day): 1750 (MSJ x AF 1.2)  Weight Used for Protein Requirements: Admission  Protein (g/day): 85 (0.8 gm/kg)  Method Used for Fluid Requirements: 1 ml/kcal  Fluid (ml/day): 1750    Nutrition Related Findings:   Edema: Generalized   Edema Generalized: +1  RUE Edema: +1     RLE Edema: +1  LLE Edema: +1    Recent Labs     06/10/25  1700 06/11/25  1022 06/12/25  0758   GLUCOSE 158* 162* 159*   BUN 57* 39* 52*   CREATININE 10.80* 7.49* 9.02*   * 134* 132*   K 5.9* 5.7* 5.4*   CL 95* 97 99   CO2 22 20* 22   CALCIUM 8.9 9.7 9.8   PHOS 5.1*  --   --   Weight    Discharge Planning:    Continue current diet     Cindy Deleon RD  Available via [employer]

## 2025-06-12 NOTE — PROGRESS NOTES
1729: Code NAVIN for patient agitation, pulling off nasal cannula, attempting to pull out IV and HD catheter, screaming, attempting to throw self out of bed, kicking, hitting, and attempting to bite. Patient oriented x0, not following commands. Attempted to reorient patient, unsuccessful. Patient not responsive to decreased stimulation, repositioning, verbal re-direction. 1:1 sitter already at bedside. Called LUCIE Zunigadon ordered. Geodon administered at 7273.

## 2025-06-12 NOTE — PROGRESS NOTES
1729- Ruel Crespo MD notified   Pt oriented x0. Verbal redirection attempted, unsuccessful  IM Geodon given     1830- Pt placed in bilateral soft wrist restraints

## 2025-06-12 NOTE — FLOWSHEET NOTE
06/12/25 1010   Treatment   Treatment Goal 3000   Observations & Evaluations   Level of Consciousness 1   Oriented X 1   Heart Rhythm Regular   Respiratory Quality/Effort Labored   O2 Device Nasal cannula   Bilateral Breath Sounds Expiratory wheezes   Skin Condition/Temp Dry   Abdomen Inspection Obese   Edema Generalized   Edema Generalized +1   RUE Edema +1   RLE Edema +1   Vital Signs   BP (!) 171/63   Temp 98.4 °F (36.9 °C)   Pulse 90   Respirations 22   SpO2 97 %   Weight - Scale 109 kg (240 lb 4.8 oz)   Weight Method Bed scale   Percent Weight Change 4.48   Pain Assessment   Pain Assessment None - Denies Pain   Hemodialysis Central Access - Permanent/Tunneled Right Subclavian   Placement Date/Time: 06/06/25 0826   Present on Admission/Arrival: Yes  Orientation: Right  Access Location: Subclavian   Continued need for line? Yes   Site Assessment Clean, dry & intact   Blue Lumen Status Brisk blood return   Red Lumen Status Brisk blood return   Line Care Connections checked and tightened   Dressing Type Sterile dressing, transparent   Date of Last Dressing Change 06/12/25   Dressing Status Clean, dry & intact   Technical Checks   Dialysis Machine No. [id number]   RO Machine Number [id number]   Dialyzer Lot No. [id number]   Tubing Lot Number [id number]   All Connections Secure Yes   NS Bag Yes   Saline Line Double Clamped Yes   Dialyzer Nipro ELISIO   Prime Volume (mL) 200 mL   ICEBOAT I;C;E;B;O;A;T   RO Machine Log Sheet Completed Yes   Machine Alarm Self Test Completed;Passed   Air Foam Detector Tested;Proper Function;pH Reading   Extracorporeal Circuit Tested for Integrity Yes   Machine Conductivity 13.8   Manual Ph 7.3   Bleach Test (Neg) Yes   Bath Temperature 97.7 °F (36.5 °C)   Treatment Initiation   Dialyze Hours 3.25   Treatment  Initiation Universal Precautions maintained;Lines secured to patient;Connections secured;Prime given;Venous Parameters set;Arterial Parameters set;Air foam detector engaged

## 2025-06-12 NOTE — SIGNIFICANT EVENT
Seen at bedside for Code NAVIN. Patient is agitated. Risk of harm to self or someone else. Order for restraints & geodon have been given.     Bonny Giraldo MD

## 2025-06-12 NOTE — PROGRESS NOTES
Patient seen and examined  Arm actually looks better  No drainage  WBC coming down   No fever  Continue to observe off antibiotics  Cancel exploration.

## 2025-06-12 NOTE — PROGRESS NOTES
[facility]    Malini Griggs  YOB: 1945          Assessment & Plan:     ESRD on HD TTS at [facility]  AMS  New RUE AVF with arm edema  DM2  Anemia    Rec:  Seen on HD mark well  HD TTS  FERNANDA TTS       Subjective:   CC: ESRD  HPI: Seen on HD. Mark well but remains confused  Current Facility-Administered Medications   Medication Dose Route Frequency    [Held by provider] diazePAM (VALIUM) injection 5 mg  5 mg IntraVENous Q6H PRN    sodium zirconium cyclosilicate (LOKELMA) oral suspension 10 g  10 g Oral Once    albumin human 25% IV solution 12.5 g  12.5 g IntraVENous PRN    traMADol (ULTRAM) tablet 50 mg  50 mg Oral Q12H PRN    [Held by provider] insulin glargine (LANTUS) injection vial 10 Units  10 Units SubCUTAneous Nightly    [Held by provider] HYDROmorphone (DILAUDID) injection 0.5 mg  0.5 mg IntraVENous Q4H PRN    insulin lispro (HUMALOG,ADMELOG) injection vial 0-4 Units  0-4 Units SubCUTAneous 4x Daily AC & HS    glucose chewable tablet 16 g  4 tablet Oral PRN    dextrose bolus 10% 125 mL  125 mL IntraVENous PRN    Or    dextrose bolus 10% 250 mL  250 mL IntraVENous PRN    glucagon injection 1 mg  1 mg SubCUTAneous PRN    dextrose 10 % infusion   IntraVENous Continuous PRN    carvedilol (COREG) tablet 6.25 mg  6.25 mg Oral BID    epoetin jay-epbx (RETACRIT) injection 3,000 Units  3,000 Units SubCUTAneous Once per day on Tuesday Thursday Saturday    ferrous sulfate (IRON 325) tablet 325 mg  325 mg Oral QHS    sevelamer (RENVELA) tablet 800 mg  800 mg Oral TID     docusate sodium (COLACE) capsule 100 mg  100 mg Oral Every Other Day    sodium chloride flush 0.9 % injection 5-40 mL  5-40 mL IntraVENous 2 times per day    sodium chloride flush 0.9 % injection 5-40 mL  5-40 mL IntraVENous PRN    0.9 % sodium chloride infusion   IntraVENous PRN    ondansetron (ZOFRAN-ODT) disintegrating tablet 4 mg  4 mg Oral Q8H PRN    Or    ondansetron (ZOFRAN)

## 2025-06-12 NOTE — CONSULTS
INPATIENT NEUROLOGY CONSULT NOTE      NAME:     Malini Griggs    ADMIT DATE:    6/5/2025  2:20 PM    CONSULT DATE:  06/12/25      HPI: 79 y.o. female who  has a past medical history of Arthritis, CHF (congestive heart failure) ([facility]), Diabetes mellitus ([facility]), Heart murmur, Hemodialysis patient, Hyperlipidemia, Hypertension, Kidney failure, MRSA (methicillin resistant Staphylococcus aureus), Prolonged emergence from general anesthesia, Seizure ([facility]), Sleep apnea, and Thyroid disease.    Neurology is asked to evaluate to patient for: AMS of unclear etiology     Hx per chart review and discussion with Dr Crespo/ Hospitalist earlier today.     Patient admitted on 6-5-2025 from [facility]ialysis [facility] after she had a 20 second episode of syncope.  Patient was awake but drowsy when arriving at ER, told ER staff she wasn't sure if she used her CPAP machine the night before, or she had completed the dialysis session that day. CT Head didn't show any acute intracranial abnormality.  Carotid US on 6-5-25: mild stenosis < 50% on both sides; normal antegrade flow of both vertebrals.Nephrology saw her and note that the RUExt AV Fistula was new, and there was some associated arm edema. Vascular Surgery saw her and felt the post-op swelling was within the normal range, felt patient could be d/c and follow up in [facility] in 1-2 weeks for post-op follow up visit.  Admitting Hospitalist noted that patient had taken hydrocodone and benadryl that morning so that may have been cause of change in mentation.  Hypotension after dialysis on 7-7-25.  Patient felt to have potentially infected RUE AV Fistula.  She was put on IV Abx (Vancomycin and Cefepime) on / around 6-7-2025.      In d/w Dr Crespo today, he relays that Infection of the graft and sepsis have been ruled out, cultures negative, IV antibiotics stopped (cefepime stopped on 6-11-25; vancomycin stopped 6-11-25, given zosyn on  6-11-25). ID has seen patient and given consideration to

## 2025-06-12 NOTE — PROGRESS NOTES
DANE HENSON [facility]  [address] [place] [address] [phone]        Hospitalist Progress Note      NAME: Malini Griggs   :  1945  MRM:  [id number]    Date/Time of service: 2025  11:45 AM       Subjective:     Chief Complaint:  Patient was personally seen and examined by me during this time period.  Chart reviewed.   She remains confused.  Sitter at bedside.       Objective:       Vitals:       Last 24hrs VS reviewed since prior progress note. Most recent are:    Vitals:    25 1115   BP: (!) 154/86   Pulse:    Resp:    Temp:    SpO2:      SpO2 Readings from Last 6 Encounters:   25 97%   23 96%   23 99%          Intake/Output Summary (Last 24 hours) at 2025 1145  Last data filed at 2025  Gross per 24 hour   Intake 0 ml   Output --   Net 0 ml          Exam:     Physical Exam:    Gen:  elderly, obese, NAD  HEENT:  Pink conjunctivae, PERRL, hearing intact to voice, moist mucous membranes  Neck:  Supple, without masses, thyroid non-tender  Resp:  No accessory muscle use, clear breath sounds without wheezes rales or rhonchi  Card:  No murmurs, normal S1, S2 without thrills, bruits or peripheral edema  Abd:  Soft, non-tender, non-distended, normoactive bowel sounds are present  Musc:  No cyanosis or clubbing  Skin:  RUE w/ erythema, swelling, pain.  No drainage   Neuro:   does not follow commands  Psych: Confused, agitated    Medications Reviewed: (see below)    Lab Data Reviewed: (see below)    ______________________________________________________________________    Medications:     Current Facility-Administered Medications   Medication Dose Route Frequency    [Held by provider] diazePAM (VALIUM) injection 5 mg  5 mg IntraVENous Q6H PRN    sodium zirconium cyclosilicate (LOKELMA) oral suspension 10 g  10 g Oral Once    albumin human 25% IV solution 12.5 g  12.5 g IntraVENous PRN    traMADol (ULTRAM) tablet 50 mg  50 mg Oral Q12H

## 2025-06-13 LAB
ANION GAP SERPL CALC-SCNC: 16 MMOL/L (ref 2–12)
BASOPHILS # BLD: 0 K/UL (ref 0–0.1)
BASOPHILS NFR BLD: 0 % (ref 0–1)
BUN SERPL-MCNC: 30 MG/DL (ref 6–20)
BUN/CREAT SERPL: 5 (ref 12–20)
CALCIUM SERPL-MCNC: 9.7 MG/DL (ref 8.5–10.1)
CHLORIDE SERPL-SCNC: 100 MMOL/L (ref 97–108)
CO2 SERPL-SCNC: 21 MMOL/L (ref 21–32)
CREAT SERPL-MCNC: 5.6 MG/DL (ref 0.55–1.02)
DIFFERENTIAL METHOD BLD: ABNORMAL
EOSINOPHIL # BLD: 1.4 K/UL (ref 0–0.4)
EOSINOPHIL NFR BLD: 10 % (ref 0–7)
ERYTHROCYTE [DISTWIDTH] IN BLOOD BY AUTOMATED COUNT: 14.8 % (ref 11.5–14.5)
GLUCOSE BLD STRIP.AUTO-MCNC: 154 MG/DL (ref 65–117)
GLUCOSE BLD STRIP.AUTO-MCNC: 157 MG/DL (ref 65–117)
GLUCOSE BLD STRIP.AUTO-MCNC: 223 MG/DL (ref 65–117)
GLUCOSE SERPL-MCNC: 141 MG/DL (ref 65–100)
HCT VFR BLD AUTO: 30.7 % (ref 35–47)
HGB BLD-MCNC: 9.3 G/DL (ref 11.5–16)
IMM GRANULOCYTES # BLD AUTO: 0 K/UL (ref 0–0.04)
IMM GRANULOCYTES NFR BLD AUTO: 0 % (ref 0–0.5)
LYMPHOCYTES # BLD: 1.54 K/UL (ref 0.8–3.5)
LYMPHOCYTES NFR BLD: 11 % (ref 12–49)
MCH RBC QN AUTO: 27.9 PG (ref 26–34)
MCHC RBC AUTO-ENTMCNC: 30.3 G/DL (ref 30–36.5)
MCV RBC AUTO: 92.2 FL (ref 80–99)
METAMYELOCYTES NFR BLD MANUAL: 1 %
MONOCYTES # BLD: 0.56 K/UL (ref 0–1)
MONOCYTES NFR BLD: 4 % (ref 5–13)
NEUTS BAND NFR BLD MANUAL: 1 %
NEUTS SEG # BLD: 10.36 K/UL (ref 1.8–8)
NEUTS SEG NFR BLD: 73 % (ref 32–75)
NRBC # BLD: 0.04 K/UL (ref 0–0.01)
NRBC BLD-RTO: 0.3 PER 100 WBC
PLATELET # BLD AUTO: 354 K/UL (ref 150–400)
PMV BLD AUTO: 9.3 FL (ref 8.9–12.9)
POTASSIUM SERPL-SCNC: 5.2 MMOL/L (ref 3.5–5.1)
RBC # BLD AUTO: 3.33 M/UL (ref 3.8–5.2)
RBC MORPH BLD: ABNORMAL
SERVICE CMNT-IMP: ABNORMAL
SODIUM SERPL-SCNC: 137 MMOL/L (ref 136–145)
WBC # BLD AUTO: 14 K/UL (ref 3.6–11)

## 2025-06-13 PROCEDURE — 2060000000 HC ICU INTERMEDIATE R&B

## 2025-06-13 PROCEDURE — 6360000002 HC RX W HCPCS: Performed by: INTERNAL MEDICINE

## 2025-06-13 PROCEDURE — 93005 ELECTROCARDIOGRAM TRACING: CPT | Performed by: INTERNAL MEDICINE

## 2025-06-13 PROCEDURE — 82962 GLUCOSE BLOOD TEST: CPT

## 2025-06-13 PROCEDURE — 2580000003 HC RX 258: Performed by: INTERNAL MEDICINE

## 2025-06-13 PROCEDURE — 2500000003 HC RX 250 WO HCPCS: Performed by: HOSPITALIST

## 2025-06-13 PROCEDURE — XX20X89 MONITORING OF BRAIN ELECTRICAL ACTIVITY, COMPUTER-AIDED DETECTION AND NOTIFICATION, NEW TECHNOLOGY GROUP 9: ICD-10-PCS | Performed by: PSYCHIATRY & NEUROLOGY

## 2025-06-13 PROCEDURE — 2700000000 HC OXYGEN THERAPY PER DAY

## 2025-06-13 PROCEDURE — 85025 COMPLETE CBC W/AUTO DIFF WBC: CPT

## 2025-06-13 PROCEDURE — 94761 N-INVAS EAR/PLS OXIMETRY MLT: CPT

## 2025-06-13 PROCEDURE — 95717 EEG PHYS/QHP 2-12 HR W/O VID: CPT | Performed by: PSYCHIATRY & NEUROLOGY

## 2025-06-13 PROCEDURE — 6370000000 HC RX 637 (ALT 250 FOR IP): Performed by: INTERNAL MEDICINE

## 2025-06-13 PROCEDURE — 6370000000 HC RX 637 (ALT 250 FOR IP): Performed by: HOSPITALIST

## 2025-06-13 PROCEDURE — 80048 BASIC METABOLIC PNL TOTAL CA: CPT

## 2025-06-13 PROCEDURE — 99233 SBSQ HOSP IP/OBS HIGH 50: CPT | Performed by: PSYCHIATRY & NEUROLOGY

## 2025-06-13 PROCEDURE — 99232 SBSQ HOSP IP/OBS MODERATE 35: CPT | Performed by: INTERNAL MEDICINE

## 2025-06-13 PROCEDURE — 6360000002 HC RX W HCPCS

## 2025-06-13 RX ORDER — ZIPRASIDONE MESYLATE 20 MG/ML
10 INJECTION, POWDER, LYOPHILIZED, FOR SOLUTION INTRAMUSCULAR ONCE
Status: COMPLETED | OUTPATIENT
Start: 2025-06-13 | End: 2025-06-13

## 2025-06-13 RX ADMIN — INSULIN LISPRO 1 UNITS: 100 INJECTION, SOLUTION INTRAVENOUS; SUBCUTANEOUS at 21:30

## 2025-06-13 RX ADMIN — ACETAMINOPHEN 650 MG: 325 TABLET ORAL at 12:04

## 2025-06-13 RX ADMIN — ACETAMINOPHEN 650 MG: 325 TABLET ORAL at 19:34

## 2025-06-13 RX ADMIN — FERROUS SULFATE TAB 325 MG (65 MG ELEMENTAL FE) 325 MG: 325 (65 FE) TAB at 21:30

## 2025-06-13 RX ADMIN — AMIODARONE HYDROCHLORIDE 150 MG: 50 INJECTION, SOLUTION INTRAVENOUS at 14:29

## 2025-06-13 RX ADMIN — SODIUM CHLORIDE, PRESERVATIVE FREE 10 ML: 5 INJECTION INTRAVENOUS at 21:30

## 2025-06-13 RX ADMIN — CARVEDILOL 6.25 MG: 6.25 TABLET, FILM COATED ORAL at 07:18

## 2025-06-13 RX ADMIN — APIXABAN 5 MG: 5 TABLET, FILM COATED ORAL at 21:30

## 2025-06-13 RX ADMIN — ROSUVASTATIN CALCIUM 5 MG: 10 TABLET, FILM COATED ORAL at 21:30

## 2025-06-13 RX ADMIN — CARVEDILOL 6.25 MG: 6.25 TABLET, FILM COATED ORAL at 21:30

## 2025-06-13 RX ADMIN — ZIPRASIDONE MESYLATE 10 MG: 20 INJECTION, POWDER, LYOPHILIZED, FOR SOLUTION INTRAMUSCULAR at 03:47

## 2025-06-13 RX ADMIN — SODIUM CHLORIDE, PRESERVATIVE FREE 10 ML: 5 INJECTION INTRAVENOUS at 07:59

## 2025-06-13 ASSESSMENT — PAIN DESCRIPTION - LOCATION: LOCATION: HEAD

## 2025-06-13 ASSESSMENT — PAIN SCALES - GENERAL
PAINLEVEL_OUTOF10: 0
PAINLEVEL_OUTOF10: 3

## 2025-06-13 NOTE — PROGRESS NOTES
DANE HEN[facility]  [address] [place] [address] [phone]        Hospitalist Progress Note      NAME: Malini Griggs   :  1945  MRM:  [id number]    Date/Time of service: 2025  4:04 PM       Subjective:     Overnight patient was agitated and received IM Geodon.  She was sleeping this morning, until mid morning.  She then woke up and was much more talkative than she had been over the last several days.  She is able to say where she is, and that she was at [facility] several weeks ago for similar presentation.  She says she feels fine without complaints.    Later she had an episode of atrial fibrillation with rapid ventricular response, with rates in the 150s.  Blood pressure 90s over 40s.  During this time.  She endorses no chest pain or shortness of breath.       Objective:       Vitals:       Last 24hrs VS reviewed since prior progress note. Most recent are:    Vitals:    25 1449   BP: (!) 130/59   Pulse:    Resp:    Temp:    SpO2:      SpO2 Readings from Last 6 Encounters:   25 91%   23 96%   23 99%          Intake/Output Summary (Last 24 hours) at 2025 1604  Last data filed at 2025 2042  Gross per 24 hour   Intake 0 ml   Output --   Net 0 ml          Exam:     Physical Exam:    Gen:  elderly, obese, NAD  HEENT:  Pink conjunctivae, PERRL, hearing intact to voice, moist mucous membranes  Neck:  Supple, without masses, thyroid non-tender  Resp:  No accessory muscle use, clear breath sounds without wheezes rales or rhonchi  Card:  irreg irreg tachy  Abd:  Soft, non-tender, non-distended, normoactive bowel sounds are present  Musc:  No cyanosis or clubbing  Skin:  RUE w/ erythema, swelling, pain.  No drainage   Neuro:   grossly normal  Psych: pleasant, alert, oriented    Medications Reviewed: (see below)    Lab Data Reviewed: (see  infusion   IntraVENous PRN    ondansetron (ZOFRAN-ODT) disintegrating tablet 4 mg  4 mg Oral Q8H PRN    Or    ondansetron (ZOFRAN) injection 4 mg  4 mg IntraVENous Q6H PRN    polyethylene glycol (GLYCOLAX) packet 17 g  17 g Oral Daily PRN    acetaminophen (TYLENOL) tablet 650 mg  650 mg Oral Q6H PRN    Or    acetaminophen (TYLENOL) suppository 650 mg  650 mg Rectal Q6H PRN    apixaban (ELIQUIS) tablet 5 mg  5 mg Oral BID    [Held by provider] gabapentin (NEURONTIN) capsule 100 mg  100 mg Oral TID    levothyroxine (SYNTHROID) tablet 75 mcg  75 mcg Oral QAM AC    losartan (COZAAR) tablet 25 mg  25 mg Oral Daily    rosuvastatin (CRESTOR) tablet 5 mg  5 mg Oral Nightly          Lab Review:     Recent Labs     06/10/25  1700 06/11/25  1022 06/13/25  0257   WBC 15.8* 13.8* 14.0*   HGB 8.6* 9.2* 9.3*   HCT 27.0* 29.8* 30.7*    298 354     Recent Labs     06/10/25  1700 06/11/25  1022 06/12/25  0758 06/13/25  0257   * 134* 132* 137   K 5.9* 5.7* 5.4* 5.2*   CL 95* 97 99 100   CO2 22 20* 22 21   BUN 57* 39* 52* 30*   MG 2.0  --   --   --    PHOS 5.1*  --   --   --      No results found for: \"GLUCPOC\"       Assessment / Plan:     80 yo hx of HTN, DM, ESRD, recent AV fistula, presented w/ AMS, syncope, hypotension, possible infected RUE fistula     RUE AV fistula site swelling/induration: Infection of the graft and sepsis ruled out.  Cultures negative.  Per vascular/ID Per ID/vascular, low suspicion for graft infection, stopped antibiotics.      Acute Metabolic Encephalopathy: Likely encephalopathy from cefepime, as well as ESRD.  Stopped cefepime 6/11.  Initial CT head 6/5 was unremarkable for acute finding.  She seems to have cleared today, but is at risk for hospital-acquired delirium.  Continue supportive care, 24-hour EEG in place.    AFwRVR: New for her that I can gather. Euvolemic at this time. Already on AC   - Cont apixaban   - Cont BB   - Converted to NSR with Amio bolus   - Volume with HD

## 2025-06-13 NOTE — PROGRESS NOTES
0700 Bedside and Verbal shift change report given to CHARBEL Rowe (oncoming nurse) by CHARBEL Davis (offgoing nurse). Report included the following information Nurse Handoff Report, Recent Results, Med Rec Status, and Cardiac Rhythm NSR.     1210 Sitter notified RN that pt was asking for Tylenol. Pt then assessed by this RN - found to be responsive, talking, oriented x 2, following commands. Pt states she has a headache. Notified MD. Pt resting comfortably at this time.     1420 RRT called due to increased HR (140s-170s). MD and RRT nurse at bedside. Order placed for amio bolus and gtt

## 2025-06-13 NOTE — PLAN OF CARE
Problem: Safety - Adult  Goal: Free from fall injury  Outcome: Progressing     Problem: Chronic Conditions and Co-morbidities  Goal: Patient's chronic conditions and co-morbidity symptoms are monitored and maintained or improved  Outcome: Progressing     Problem: Discharge Planning  Goal: Discharge to home or other facility with appropriate resources  Outcome: Progressing     Problem: Pain  Goal: Verbalizes/displays adequate comfort level or baseline comfort level  Outcome: Progressing     Problem: Skin/Tissue Integrity  Goal: Skin integrity remains intact  Description: 1.  Monitor for areas of redness and/or skin breakdown2.  Assess vascular access sites hourly3.  Every 4-6 hours minimum:  Change oxygen saturation probe site4.  Every 4-6 hours:  If on nasal continuous positive airway pressure, respiratory therapy assess nares and determine need for appliance change or resting period  Outcome: Progressing     Problem: ABCDS Injury Assessment  Goal: Absence of physical injury  Outcome: Progressing     Problem: Nutrition Deficit:  Goal: Optimize nutritional status  Outcome: Progressing     Problem: Safety - Violent/Self-destructive Restraint  Goal: Remains free of injury from restraints (Restraint for Violent/Self-Destructive Behavior)  Description: INTERVENTIONS:1. Determine that de-escalation and other, less restrictive measures have been tried or would not be effective before applying the restraint2. Identify and document the criteria for restraint3. Evaluate the patient's condition at the time of restraint application4. Inform patient/family regarding the reason for restraint/seclusion5. Q2H: Monitor comfort, nutrition and hydration needs6. Q15M: Perform safety checks including skin, circulation, sensory, respiratory and psychological status7. Ensure continuous observation8. Identify and implement measures to help patient regain control, assess readiness for release and initiate progressive release per

## 2025-06-13 NOTE — CARE COORDINATION
1:57 PM  06/13/25    Care Management Progress Note    Reason for Admission:   End stage renal disease (HCC) [N18.6]  Altered mental status [R41.82]  RBBB [I45.10]  Syncope, unspecified syncope type [R55]  SIRS (systemic inflammatory response syndrome) (HCC) [R65.10]  Procedure(s) (LRB):  EXPLORATION RIGHT ARM (Right)       Patient Admission Status: Inpatient  Date Admitted to INP:  6/7/25  RUR: Readmission Risk Score: 19    Hospitalization in the last 30 days (Readmission):  No        Transition of care plan:  Pt discussed during IDR- ongoing medical management; nephrology, ID and neurology following. Pt has 1:1 sitter at bedside due to agitation/confusion.  Anticipated discharge plan: TBD, pending clinical progression. Pt lives with granddaughter at baseline, on HD TTS with [facility]. [facility] has accepted in [facility] and following for medical clearance.  Date IM given: Mailed on 6/12  Outpatient follow-up.  Discharge transport: TBD      CM left message with pt's granddaughter Linette to provide udpate; will continue to follow and assist with discharge needs.    AMRITA Thompson

## 2025-06-13 NOTE — PROGRESS NOTES
Rapid Response called at 1407    Responded to rapid response at 1407 for elevated HR. EKG obtained- afib RVR. Not nw onset. Did not received medicines this morning d/t lethargy, unable to swallow. Dr Rodríguez at bedside with orders for amio bolus/gtt. Patient denies chest pain, SOB or dizziness. No further interventions by RRT RN at this time     Provider at bedside: Yes- Dr Rodríguez  Interventions ordered: EKG, amio  Sepsis suspected: No  Transfer to higher level of care: No    Rapid ended at 1420    RRT RN assisted with transport to accepting unit: N/A

## 2025-06-13 NOTE — PROGRESS NOTES
Physical Therapy:  10:30  Chart reviewed. Spoke with nursing prior to attempting to see the patient for PT treatment this day. Per RN patient was in soft restraints last night with increasing AMS and is now A+O x 0 and is not following commands at this time. Per RN positive for encepholopathy.  Patient upon entry is supine flat in bed with head at the foot of the bed and sitter present.  During observation she is noted to be very restless rolling L and R often with all 4 bed rails and swinging top leg over the rail.  Sitter present at bedside.  Patient unable to verbalize needs at this time.  Patient was able to open eyes on command and when asked her last name she replied \"Malini\".  Attempted to perform supine bed ex's and due to poor cog status and decreased command following session was aborted.  We will continue to follow.    Amy Lombardo, PT, DPT, CLT

## 2025-06-13 NOTE — PROGRESS NOTES
INPATIENT NEUROLOGY FOLLOW-UP NOTE    NAME:  Malini Griggs  MRN:  [id number]  : 1945      ADMIT DATE:   2025  2:20 PM    REASON FOR FOLLOW UP: Persistent confusion/ agitation/ encephalopathy    Impression/ Plan from Neurology Consult/ 2025 : Agitation, Acute encephalopathy.  AV Fistula infection has been ruled out.  Bacteremia/ Sepsis also ruled out per notes.  ID has given consideration to Cefepime toxicity as a cause of AMS. Cefepime and Vancomycin were stopped yesterday/ .  Patient was not able to cooperate with EEG today (?non-convulsive seizure). CT head on 2025 didn't show any acute abnormality. A repeat CT head w/o contrast was ordered yesterday. I d/w RN just now who says patient about to go down for that scan.  I do not see any UA done during admission. Patient did receive broad spectrum IV Abx so that would presumably have covered UTI if that was present. Defer to Attending on whether to check UA, IF UA can even be obtained as patient has been severely agitated.  If no clear abnormality on CT head, then recommend checking Brain MRI w/o contrast when that is safe to pursue (from an agitation standpoint).  Entered order for Rapid EEG/ [facility]. D/w RN to attempt it tonight/ overnight if patient is less agitated.  Will revisit tomorrow     ===========    25      Reviewed [facility] EEG ( 2025 11:07 PM to 2025 9:33 AM  Duration 10 hours 26 minutes). Impression: Abnormal Rapid EEG/ [facility] recording showing severe generalized slowing suggestive of an encephalopathic process, not specific as to cause.  Potential etiologies include (but not limited to): toxic, metabolic, infectious, neuro-degenerative, iatrogenic (ie sedating medication) processes, or a post-ictal state.   The [employer], pyco seizure-burden software, identified 2 periods of possible seizure burden.  On manual review, there were the first period was composed of movement and electrode artifact.  The 2nd episode

## 2025-06-13 NOTE — PROGRESS NOTES
OT services deferred- patient unable to participate in therapy services due to confusion, poor command following. OT POC unchanged at this time  Rylee Morales OTR/L    Followed up in PM- patient more alert and interactive. She was undergoing continuous EEG - therapy typically holds for this. Patient also had rapid response called though heart rate was normalizing with medical intervention at time of follow up.

## 2025-06-13 NOTE — PROCEDURES
[facility]/ Rapid EEG    [place], [place]        [phone] (Main)  [phone] (Medical Records)     Interpreting Physician:  Mary Harris MD    Date/ Time:     6/12/2025 11:07 PM to 6/13/2025 9:33 AM    Total Duration:    10 hours and 26 minutes  Date of Interpretation:  6/13/25    Indication:  79 y.o. female   End stage renal disease (HCC) [N18.6]  Altered mental status [R41.82]  RBBB [I45.10]  Syncope, unspecified syncope type [R55]  SIRS (systemic inflammatory response syndrome) (HCC) [R65.10]    Impression:      Abnormal Rapid EEG/ [facility] recording showing severe generalized slowing suggestive of an encephalopathic process, not specific as to cause.  Potential etiologies include (but not limited to): toxic, metabolic, infectious, neuro-degenerative, iatrogenic (ie sedating medication) processes, or a post-ictal state.       The Clarity, AI seizure-burden software, identified 2 periods of possible seizure burden.  On manual review, there were the first period was composed of movement and electrode artifact.  The 2nd episode had a semi-rhythmic low amplitude spike pattern in the left then right posterior temporal region that was similar to cardiac artifact seen earlier in the recording.  No definite seizure discharges were seen.     Clinical and Neuro-Imaging correlation is necessary.      I recommend a full 16-channel EEG with video (routine 20-30 minute EEG or prolonged EEG) to include parasagittal coverage and improved recording quality.      =================================  Technical: This EEG was obtained using a 10 lead, 8 channel system positioned circumferentially without parasagittal coverage. Computer selected EEG is reviewed as well as background features and all clinically significant events. Clarity algorithm utilized and implemented to provide analysis of underlying activity and seizure detection used to facilitate reading.      PSHx lists History of Cranial  lispro (HUMALOG,ADMELOG) injection vial 0-4 Units, 0-4 Units, SubCUTAneous, 4x Daily AC & HS, Charlie Flanagan MD, 1 Units at 06/11/25 1614    glucose chewable tablet 16 g, 4 tablet, Oral, PRN, Charlie Flanagan MD    dextrose bolus 10% 125 mL, 125 mL, IntraVENous, PRN **OR** dextrose bolus 10% 250 mL, 250 mL, IntraVENous, PRN, Charlie Flanagan MD    glucagon injection 1 mg, 1 mg, SubCUTAneous, PRN, Charlie Flanagan MD    dextrose 10 % infusion, , IntraVENous, Continuous PRN, Charlie Flanagan MD    carvedilol (COREG) tablet 6.25 mg, 6.25 mg, Oral, BID, Charlie Flanagan MD, 6.25 mg at 06/10/25 0940    epoetin jay-epbx (RETACRIT) injection 3,000 Units, 3,000 Units, SubCUTAneous, Once per day on Tuesday Thursday Saturday, Kim Baker MD, 3,000 Units at 06/12/25 1830    ferrous sulfate (IRON 325) tablet 325 mg, 325 mg, Oral, QHS, Yodit Lopez MD, 325 mg at 06/10/25 2207    sevelamer (RENVELA) tablet 800 mg, 800 mg, Oral, TID WC, Yodit Lopez MD, 800 mg at 06/10/25 1156    docusate sodium (COLACE) capsule 100 mg, 100 mg, Oral, Every Other Day, Yodit Lopez MD, 100 mg at 06/10/25 0940    sodium chloride flush 0.9 % injection 5-40 mL, 5-40 mL, IntraVENous, 2 times per day, Yodit Lopez MD, 10 mL at 06/13/25 0759    sodium chloride flush 0.9 % injection 5-40 mL, 5-40 mL, IntraVENous, PRN, Yodit Lopez MD, 10 mL at 06/05/25 1828    0.9 % sodium chloride infusion, , IntraVENous, PRN, Yodit Lopez MD    ondansetron (ZOFRAN-ODT) disintegrating tablet 4 mg, 4 mg, Oral, Q8H PRN **OR** ondansetron (ZOFRAN) injection 4 mg, 4 mg, IntraVENous, Q6H PRN, Yodit Lopez MD    polyethylene glycol (GLYCOLAX) packet 17 g, 17 g, Oral, Daily PRN, Yodit Lopez MD    acetaminophen (TYLENOL) tablet 650 mg, 650 mg, Oral, Q6H PRN, 650 mg at 06/10/25 1937 **OR** acetaminophen (TYLENOL) suppository 650 mg, 650 mg, Rectal, Q6H PRN, Yodit Lopez MD    apixaban (ELIQUIS) tablet 5 mg, 5 mg, Oral, BID, Yodit Lopez MD, 5 mg at 06/10/25 2207

## 2025-06-13 NOTE — PROGRESS NOTES
----- Message from Zeke Doherty MD sent at 4/6/2024  9:40 AM CDT -----  Regarding: Post Op  She was not given a post op appointment.  Please call her on Monday and get her in for one.    Please remember that all operative patients need a post op appointment for us to review the pathology report and check all incisions, etc.    1) Patients with drains that we put in - need a 1 week appointment  2) All other patients can be seen around 2 weeks after surgery.      Also, sonocore and stereotactic core biopsies never need a formal post op appointment.  I call these patients with their results and then send you a message to help them make a 6 month visit appointment.    Thanks.     Infectious Disease Progress Note    Assessment / Plan:      AV graft site edema and induration  Cultures from AV graft site and blood cultures sterile at final reading  Low suspicion for graft infection per discussion with vascular surgery  Monitor off antibiotics.  If graft is infected it will need to be explanted.    AMS.  Possible neurotoxicity from cefepime.  Cefepime stopped 6/11  Neurology following    Leukocytosis.  New onset since admission.  Beginning to trend down.  No obvious new infection.  Remains afebrile  Follow trend  Watch for diarrhea which may indicate C. difficile.  Patient at risk on broad-spectrum antibiotics    Amoxicillin and sulfa allergies.  These appear to be intolerances rather than true allergies         Reason for Consult: Fever and AV graft infection  Date of Consultation: June 13, 2025  Date of Admission: 6/5/2025  Referring Physician: Dr. Flanagan    HPI:    79-year-old female with DM, CHF, ESRD on HD now via right chest wall permacath, formerly by left upper extremity AV graft for which was placed in 2023 and now failed and status post 6/2 AV graft placement on right upper extremity at [facility].  Admitted on 6/5 for syncopal episode and sent from dialysis.  Workup largely benign although starting to develop redness swelling pain on right upper extremity postop and concern for right upper extremity AV graft infection.  Course of hospitalization complicated by fever to 100.9 on 6/6 and started on vancomycin and cefepime empiric antibiotics.  Noted to have purulent drainage from 1 of surgical incisions although I do not see any at this time.  Patient has been seen by vascular surgery who recommends antibiotic therapy without further intervention currently.    Duplex ultrasound of right upper extremity appears patent no DVT.    6/9/2025.  No new complaints    6/12/2025.  Patient seen on dialysis.  Remains agitated.    6/13/2025.  Patient remains very confused    Past Medical

## 2025-06-13 NOTE — PROGRESS NOTES
1900 Bedside shift change report given to Susan RN  (oncoming nurse) by CHARBEL Ellison  (offgoing nurse). Report included the following information Nurse Handoff Report, Adult Overview, Intake/Output, MAR, and Cardiac Rhythm NSR.    2000 Pt. In bilateral soft wrist restraints.     2230 Bilateral soft wrist restraints removed. 1:1 sitter to remain in room for duration of shift.     2307 Rapid EEG initiated - advised to continue Ceribell study for more than 2 hours as long as patient is not agitated per Dr. Harris.

## 2025-06-13 NOTE — PROGRESS NOTES
[facility]    Malini Griggs  YOB: 1945          Assessment & Plan:     ESRD on HD TTS at [facility]  AMS  New RUE AVF with arm edema  DM2  Anemia  Mild hyperkalemia    Rec:  HD tomorrow  Ordered one dose lokelma if she is able to take it  HD TTS  FERNANDA TTS       Subjective:   CC: ESRD  HPI: Confused and crawling out of bed. HD yesterday. K sl high.  Current Facility-Administered Medications   Medication Dose Route Frequency    sodium zirconium cyclosilicate (LOKELMA) oral suspension 10 g  10 g Oral Once    DULoxetine (CYMBALTA) extended release capsule 60 mg  60 mg Oral Daily    [Held by provider] diazePAM (VALIUM) injection 5 mg  5 mg IntraVENous Q6H PRN    sodium zirconium cyclosilicate (LOKELMA) oral suspension 10 g  10 g Oral Once    albumin human 25% IV solution 12.5 g  12.5 g IntraVENous PRN    traMADol (ULTRAM) tablet 50 mg  50 mg Oral Q12H PRN    [Held by provider] insulin glargine (LANTUS) injection vial 10 Units  10 Units SubCUTAneous Nightly    HYDROmorphone (DILAUDID) injection 0.5 mg  0.5 mg IntraVENous Q4H PRN    insulin lispro (HUMALOG,ADMELOG) injection vial 0-4 Units  0-4 Units SubCUTAneous 4x Daily AC & HS    glucose chewable tablet 16 g  4 tablet Oral PRN    dextrose bolus 10% 125 mL  125 mL IntraVENous PRN    Or    dextrose bolus 10% 250 mL  250 mL IntraVENous PRN    glucagon injection 1 mg  1 mg SubCUTAneous PRN    dextrose 10 % infusion   IntraVENous Continuous PRN    carvedilol (COREG) tablet 6.25 mg  6.25 mg Oral BID    epoetin jay-epbx (RETACRIT) injection 3,000 Units  3,000 Units SubCUTAneous Once per day on Tuesday Thursday Saturday    ferrous sulfate (IRON 325) tablet 325 mg  325 mg Oral QHS    sevelamer (RENVELA) tablet 800 mg  800 mg Oral TID WC    docusate sodium (COLACE) capsule 100 mg  100 mg Oral Every Other Day    sodium chloride flush 0.9 % injection 5-40 mL  5-40 mL IntraVENous 2 times per day    sodium chloride

## 2025-06-14 PROBLEM — R55 SYNCOPE: Status: ACTIVE | Noted: 2025-06-14

## 2025-06-14 LAB
ALBUMIN SERPL-MCNC: 2.6 G/DL (ref 3.5–5)
ALBUMIN/GLOB SERPL: 0.6 (ref 1.1–2.2)
ALP SERPL-CCNC: 56 U/L (ref 45–117)
ALT SERPL-CCNC: 16 U/L (ref 12–78)
ANION GAP SERPL CALC-SCNC: 9 MMOL/L (ref 2–12)
AST SERPL-CCNC: 31 U/L (ref 15–37)
BASOPHILS # BLD: 0.14 K/UL (ref 0–0.1)
BASOPHILS NFR BLD: 1 % (ref 0–1)
BILIRUB SERPL-MCNC: 0.6 MG/DL (ref 0.2–1)
BUN SERPL-MCNC: 43 MG/DL (ref 6–20)
BUN/CREAT SERPL: 6 (ref 12–20)
CALCIUM SERPL-MCNC: 9.8 MG/DL (ref 8.5–10.1)
CHLORIDE SERPL-SCNC: 98 MMOL/L (ref 97–108)
CO2 SERPL-SCNC: 28 MMOL/L (ref 21–32)
CREAT SERPL-MCNC: 7.36 MG/DL (ref 0.55–1.02)
DIFFERENTIAL METHOD BLD: ABNORMAL
EOSINOPHIL # BLD: 1.76 K/UL (ref 0–0.4)
EOSINOPHIL NFR BLD: 13 % (ref 0–7)
ERYTHROCYTE [DISTWIDTH] IN BLOOD BY AUTOMATED COUNT: 14.9 % (ref 11.5–14.5)
GLOBULIN SER CALC-MCNC: 4.7 G/DL (ref 2–4)
GLUCOSE BLD STRIP.AUTO-MCNC: 124 MG/DL (ref 65–117)
GLUCOSE BLD STRIP.AUTO-MCNC: 210 MG/DL (ref 65–117)
GLUCOSE BLD STRIP.AUTO-MCNC: 259 MG/DL (ref 65–117)
GLUCOSE BLD STRIP.AUTO-MCNC: 261 MG/DL (ref 65–117)
GLUCOSE SERPL-MCNC: 161 MG/DL (ref 65–100)
HCT VFR BLD AUTO: 30 % (ref 35–47)
HGB BLD-MCNC: 9.3 G/DL (ref 11.5–16)
IMM GRANULOCYTES # BLD AUTO: 0 K/UL (ref 0–0.04)
IMM GRANULOCYTES NFR BLD AUTO: 0 % (ref 0–0.5)
LYMPHOCYTES # BLD: 2.16 K/UL (ref 0.8–3.5)
LYMPHOCYTES NFR BLD: 16 % (ref 12–49)
MCH RBC QN AUTO: 28 PG (ref 26–34)
MCHC RBC AUTO-ENTMCNC: 31 G/DL (ref 30–36.5)
MCV RBC AUTO: 90.4 FL (ref 80–99)
METAMYELOCYTES NFR BLD MANUAL: 1 %
MONOCYTES # BLD: 0.95 K/UL (ref 0–1)
MONOCYTES NFR BLD: 7 % (ref 5–13)
NEUTS BAND NFR BLD MANUAL: 1 %
NEUTS SEG # BLD: 8.37 K/UL (ref 1.8–8)
NEUTS SEG NFR BLD: 61 % (ref 32–75)
NRBC # BLD: 0.2 K/UL (ref 0–0.01)
NRBC BLD-RTO: 1.5 PER 100 WBC
PLATELET # BLD AUTO: 371 K/UL (ref 150–400)
PLATELET COMMENT: ABNORMAL
PMV BLD AUTO: 9 FL (ref 8.9–12.9)
POTASSIUM SERPL-SCNC: 4.4 MMOL/L (ref 3.5–5.1)
PROT SERPL-MCNC: 7.3 G/DL (ref 6.4–8.2)
RBC # BLD AUTO: 3.32 M/UL (ref 3.8–5.2)
RBC MORPH BLD: ABNORMAL
SERVICE CMNT-IMP: ABNORMAL
SODIUM SERPL-SCNC: 135 MMOL/L (ref 136–145)
WBC # BLD AUTO: 13.5 K/UL (ref 3.6–11)
WBC MORPH BLD: ABNORMAL

## 2025-06-14 PROCEDURE — 6370000000 HC RX 637 (ALT 250 FOR IP): Performed by: INTERNAL MEDICINE

## 2025-06-14 PROCEDURE — 2500000003 HC RX 250 WO HCPCS: Performed by: HOSPITALIST

## 2025-06-14 PROCEDURE — 94761 N-INVAS EAR/PLS OXIMETRY MLT: CPT

## 2025-06-14 PROCEDURE — 6370000000 HC RX 637 (ALT 250 FOR IP): Performed by: HOSPITALIST

## 2025-06-14 PROCEDURE — 2700000000 HC OXYGEN THERAPY PER DAY

## 2025-06-14 PROCEDURE — 95720 EEG PHY/QHP EA INCR W/VEEG: CPT | Performed by: PSYCHIATRY & NEUROLOGY

## 2025-06-14 PROCEDURE — 85025 COMPLETE CBC W/AUTO DIFF WBC: CPT

## 2025-06-14 PROCEDURE — 6370000000 HC RX 637 (ALT 250 FOR IP): Performed by: STUDENT IN AN ORGANIZED HEALTH CARE EDUCATION/TRAINING PROGRAM

## 2025-06-14 PROCEDURE — 2500000003 HC RX 250 WO HCPCS: Performed by: INTERNAL MEDICINE

## 2025-06-14 PROCEDURE — 6360000002 HC RX W HCPCS: Performed by: NURSE PRACTITIONER

## 2025-06-14 PROCEDURE — 99232 SBSQ HOSP IP/OBS MODERATE 35: CPT | Performed by: NURSE PRACTITIONER

## 2025-06-14 PROCEDURE — 6360000002 HC RX W HCPCS: Performed by: INTERNAL MEDICINE

## 2025-06-14 PROCEDURE — 93005 ELECTROCARDIOGRAM TRACING: CPT | Performed by: INTERNAL MEDICINE

## 2025-06-14 PROCEDURE — 90935 HEMODIALYSIS ONE EVALUATION: CPT

## 2025-06-14 PROCEDURE — 6370000000 HC RX 637 (ALT 250 FOR IP): Performed by: NURSE PRACTITIONER

## 2025-06-14 PROCEDURE — 80053 COMPREHEN METABOLIC PANEL: CPT

## 2025-06-14 PROCEDURE — 2580000003 HC RX 258: Performed by: INTERNAL MEDICINE

## 2025-06-14 PROCEDURE — 2060000000 HC ICU INTERMEDIATE R&B

## 2025-06-14 PROCEDURE — 36415 COLL VENOUS BLD VENIPUNCTURE: CPT

## 2025-06-14 PROCEDURE — 82962 GLUCOSE BLOOD TEST: CPT

## 2025-06-14 PROCEDURE — 36556 INSERT NON-TUNNEL CV CATH: CPT

## 2025-06-14 PROCEDURE — 94660 CPAP INITIATION&MGMT: CPT

## 2025-06-14 PROCEDURE — 2500000003 HC RX 250 WO HCPCS: Performed by: NURSE PRACTITIONER

## 2025-06-14 PROCEDURE — 2580000003 HC RX 258: Performed by: NURSE PRACTITIONER

## 2025-06-14 RX ORDER — SODIUM CHLORIDE 0.9 % (FLUSH) 0.9 %
5-40 SYRINGE (ML) INJECTION PRN
Status: DISCONTINUED | OUTPATIENT
Start: 2025-06-14 | End: 2025-06-19 | Stop reason: HOSPADM

## 2025-06-14 RX ORDER — SODIUM CHLORIDE 0.9 % (FLUSH) 0.9 %
5-40 SYRINGE (ML) INJECTION EVERY 12 HOURS SCHEDULED
Status: DISCONTINUED | OUTPATIENT
Start: 2025-06-14 | End: 2025-06-17 | Stop reason: SDUPTHER

## 2025-06-14 RX ORDER — LIDOCAINE HYDROCHLORIDE 10 MG/ML
50 INJECTION, SOLUTION EPIDURAL; INFILTRATION; INTRACAUDAL; PERINEURAL ONCE
Status: DISCONTINUED | OUTPATIENT
Start: 2025-06-14 | End: 2025-06-16

## 2025-06-14 RX ORDER — SODIUM CHLORIDE 9 MG/ML
INJECTION, SOLUTION INTRAVENOUS PRN
Status: DISCONTINUED | OUTPATIENT
Start: 2025-06-14 | End: 2025-06-19 | Stop reason: HOSPADM

## 2025-06-14 RX ADMIN — DULOXETINE 60 MG: 30 CAPSULE, DELAYED RELEASE ORAL at 12:58

## 2025-06-14 RX ADMIN — APIXABAN 5 MG: 5 TABLET, FILM COATED ORAL at 20:46

## 2025-06-14 RX ADMIN — AMIODARONE HYDROCHLORIDE 150 MG: 50 INJECTION, SOLUTION INTRAVENOUS at 06:11

## 2025-06-14 RX ADMIN — APIXABAN 5 MG: 5 TABLET, FILM COATED ORAL at 12:58

## 2025-06-14 RX ADMIN — SODIUM CHLORIDE, PRESERVATIVE FREE 10 ML: 5 INJECTION INTRAVENOUS at 20:47

## 2025-06-14 RX ADMIN — HYALURONIDASE (HUMAN RECOMBINANT) 150 UNITS: 150 INJECTION, SOLUTION SUBCUTANEOUS at 06:42

## 2025-06-14 RX ADMIN — CARVEDILOL 6.25 MG: 6.25 TABLET, FILM COATED ORAL at 07:51

## 2025-06-14 RX ADMIN — SODIUM CHLORIDE, PRESERVATIVE FREE 10 ML: 5 INJECTION INTRAVENOUS at 20:46

## 2025-06-14 RX ADMIN — SEVELAMER CARBONATE 800 MG: 800 TABLET, FILM COATED ORAL at 12:59

## 2025-06-14 RX ADMIN — DOCUSATE SODIUM 100 MG: 100 CAPSULE, LIQUID FILLED ORAL at 12:59

## 2025-06-14 RX ADMIN — EPOETIN ALFA-EPBX 3000 UNITS: 4000 INJECTION, SOLUTION INTRAVENOUS; SUBCUTANEOUS at 17:24

## 2025-06-14 RX ADMIN — ROSUVASTATIN CALCIUM 5 MG: 10 TABLET, FILM COATED ORAL at 20:46

## 2025-06-14 RX ADMIN — SEVELAMER CARBONATE 800 MG: 800 TABLET, FILM COATED ORAL at 17:23

## 2025-06-14 RX ADMIN — FERROUS SULFATE TAB 325 MG (65 MG ELEMENTAL FE) 325 MG: 325 (65 FE) TAB at 20:46

## 2025-06-14 RX ADMIN — AMIODARONE HYDROCHLORIDE 0.5 MG/MIN: 50 INJECTION, SOLUTION INTRAVENOUS at 23:08

## 2025-06-14 RX ADMIN — INSULIN LISPRO 2 UNITS: 100 INJECTION, SOLUTION INTRAVENOUS; SUBCUTANEOUS at 20:46

## 2025-06-14 RX ADMIN — CARVEDILOL 6.25 MG: 6.25 TABLET, FILM COATED ORAL at 20:46

## 2025-06-14 RX ADMIN — LEVOTHYROXINE SODIUM 75 MCG: 0.03 TABLET ORAL at 05:15

## 2025-06-14 RX ADMIN — TRAMADOL HYDROCHLORIDE 50 MG: 50 TABLET, COATED ORAL at 05:13

## 2025-06-14 RX ADMIN — AMIODARONE HYDROCHLORIDE 1 MG/MIN: 50 INJECTION, SOLUTION INTRAVENOUS at 13:34

## 2025-06-14 RX ADMIN — Medication 6 MG: at 21:15

## 2025-06-14 RX ADMIN — INSULIN LISPRO 1 UNITS: 100 INJECTION, SOLUTION INTRAVENOUS; SUBCUTANEOUS at 17:23

## 2025-06-14 RX ADMIN — AMIODARONE HYDROCHLORIDE 150 MG: 50 INJECTION, SOLUTION INTRAVENOUS at 12:57

## 2025-06-14 ASSESSMENT — PAIN SCALES - GENERAL: PAINLEVEL_OUTOF10: 6

## 2025-06-14 ASSESSMENT — PAIN DESCRIPTION - DESCRIPTORS: DESCRIPTORS: ACHING

## 2025-06-14 ASSESSMENT — PAIN DESCRIPTION - LOCATION: LOCATION: HEAD

## 2025-06-14 NOTE — PROGRESS NOTES
[facility]: [facility]    Emerald Shah, MSHA, CNRN, ACNP-BC  [facility]  [address]  [phone]        Name:   Malini Griggs   Medical record #: [id number]  Admission Date: 6/5/2025   Reason for Consult:  Altered mental status of unclear etiology      6/12/2025 HPI: 79 y.o. female who  has a past medical history of Arthritis, CHF (congestive heart failure) ([facility]), Diabetes mellitus ([facility]), Heart murmur, Hemodialysis patient, Hyperlipidemia, Hypertension, Kidney failure, MRSA (methicillin resistant Staphylococcus aureus), Prolonged emergence from general anesthesia, Seizure ([facility]), Sleep apnea, and Thyroid disease.     Neurology is asked to evaluate to patient for: AMS of unclear etiology      Hx per chart review and discussion with Dr Crespo/ Hospitalist earlier today.      Patient admitted on 6-5-2025 from Dialysis Center after she had a 20 second episode of syncope.  Patient was awake but drowsy when arriving at ER, told ER staff she wasn't sure if she used her CPAP machine the night before, or she had completed the dialysis session that day. CT Head didn't show any acute intracranial abnormality.  Carotid US on 6-5-25: mild stenosis < 50% on both sides; normal antegrade flow of both vertebrals.Nephrology saw her and note that the RUExt AV Fistula was new, and there was some associated arm edema. Vascular Surgery saw her and felt the post-op swelling was within the normal range, felt patient could be d/c and follow up in [facility] in 1-2 weeks for post-op follow up visit.  Admitting Hospitalist noted that patient had taken hydrocodone and benadryl that morning so that may have been cause of change in mentation.  Hypotension after dialysis on 7-7-25.  Patient felt to have potentially infected RUE AV Fistula.  She was put on IV Abx (Vancomycin and Cefepime) on / around 6-7-2025.       In d/w Dr Crespo today, he relays that Infection of the graft and sepsis have been  ruled out, cultures negative, IV antibiotics stopped (cefepime stopped on 6-11-25; vancomycin stopped 6-11-25, given zosyn on  6-11-25). ID has seen patient and given consideration to potential neuro-toxicity from cefepime as a cause of AMS.  EEG was attempted today but Technician informed me patient was very agitated, could not cooperate with study.    6/13/25       Reviewed Ceribell EEG ( 6/12/2025 11:07 PM to 6/13/2025 9:33 AM  Duration 10 hours 26 minutes). Impression: Abnormal Rapid EEG/ Ceribell recording showing severe generalized slowing suggestive of an encephalopathic process, not specific as to cause.  Potential etiologies include (but not limited to): toxic, metabolic, infectious, neuro-degenerative, iatrogenic (ie sedating medication) processes, or a post-ictal state.   The WeDidIt seizure-burden software, identified 2 periods of possible seizure burden.  On manual review, there were the first period was composed of movement and electrode artifact.  The 2nd episode had a semi-rhythmic low amplitude spike pattern in the left then right posterior temporal region that was similar to cardiac artifact seen earlier in the recording.  No definite seizure discharges were seen. Clinical and Neuro-Imaging correlation is necessary.  I recommend a full 16-channel EEG with video (routine 20-30 minute EEG or prolonged EEG) to include parasagittal coverage and improved recording quality.     Reviewed AM labs: CMP with Na 137, K 5.2 (minimally elevated), CR 5.60/GFR 7 (ESRD).  CBC with WBC 14.0, Hgb 9.3, HCT 30.7, platelet count 354,000     Patient in bed, flipped around (head at foot of bed) moving around erratically.  Sitter in room with her.  She does not respond to questions.  Seems to be staring at examiner. Briefly follows command to move hands, lift arms.         ASSESSMENT/ PLAN:  Persistent confusion.  No infection found per Hospitalist/ ID.  Considerations include cefepime-related encephalopathy (stopped

## 2025-06-14 NOTE — PROGRESS NOTES
Physical Therapy:  12:20  Chart reviewed. Noted multiple rapid's called overnight for tachycardia and without IV access. Spoke with RN prior to initiating PT Tx today and RN indicated patient now has patent IV access however is on HD at this time.  We are unable to see for PT treatment and we will continue to follow.    Amy Lombardo, PT, DPT, CLT

## 2025-06-14 NOTE — FLOWSHEET NOTE
Each catheter limb disinfected per p&p, caps removed, hubs disinfected per p&p.  Primary RN SBAR:   Jessica Balderas RN  Incapacitated Nurse [facility]. provided: YES  Patient Education provided: [place] CHANGES    Preferred Education method and Primary language: ENGLSIH, VERBAL    Dialysis consent: YES  Hospital General Consent Verified: YES  Hospital associated wait time; reason: NONE  Hepatitis B Surface Ag   Date/Time Value Ref Range Status   06/12/2025 11:57 AM <0.10 Index Final     Hep B S Ag Interp   Date/Time Value Ref Range Status   06/12/2025 11:57 AM Negative NEG   Final     Hep B S Ab   Date/Time Value Ref Range Status   06/12/2025 11:57 AM >1000.00 mIU/mL Final     Hep B S Ab Interp   Date/Time Value Ref Range Status   06/12/2025 11:57 AM REACTIVE (A) NR   Final        06/14/25 1040   Treatment   Treatment Goal 3000   Observations & Evaluations   Level of Consciousness 0   Oriented X 4   Heart Rhythm Regular   Respiratory Quality/Effort Unlabored   O2 Device None (Room air)   Bilateral Breath Sounds Diminished   Skin Color Pale   Skin Condition/Temp Warm   Abdomen Inspection Obese   Bowel Sounds (All Quadrants) Active   Edema Generalized   Edema Generalized +1   RUE Edema +2   RLE Edema +1   LLE Edema +1   Comments PT IN BED, AMS, TALKATIVE   Vital Signs   /69   Temp 97.9 °F (36.6 °C)   Pulse (!) 104   Respirations 16   SpO2 92 %   Pain Assessment   Pain Assessment None - Denies Pain   Hemodialysis Central Access - Permanent/Tunneled Right Subclavian   Placement Date/Time: 06/06/25 0826   Present on Admission/Arrival: Yes  Orientation: Right  Access Location: Subclavian   Continued need for line? Yes   Site Assessment Clean, dry & intact   Blue Lumen Status Brisk blood return;Flushed   Red Lumen Status Brisk blood return;Flushed   Line Care Connections checked and tightened   Dressing Type Antimicrobial;Transparent   Date of Last Dressing Change 06/10/25   Dressing Status Clean, dry & intact   Technical Checks

## 2025-06-14 NOTE — PROCEDURES
PROCEDURE NOTE  Date: 6/14/2025   Name: Malini Griggs  YOB: 1945    Procedures      [facility]     Electroencephalogram Report    Procedure ID: [id number] Procedure Date: 06/14/2025   Patient Name: Malini Griggs YOB: 1945   Procedure Type: 24 hour video Medical Record No: [id number]     INDICATION: Altered mental status    STATE: awake and sleep.    DESCRIPTION OF PROCEDURE: Electrodes were applied in accordance with the international 10-20 system of electrode placement.  EEG was reviewed in both bipolar and referential montages.    Description of Activity:  Video EEG recording was started on 06/13/2025 at 11:18:51 and ended 06/14/2025 at 11:47:53 for a total of 24:20:02 hours of EEG recorded.    During wakefulness, patient is only able to mount 5-6 hertz frequency that spreads anteriorly.  There is delta background slowing.  No normal awake pattern seen.    During drowsiness, there is attenuation of the underlying frequency and slower and low voltage theta activity occurs bilaterally.  Early stages of sleep are seen and are symmetric.    No sharp or spike discharges, seizures or epileptiform discharges seen throughout the recording. No focal asymmetry.    Patient is seen to be awake, eating, talking on the phone, moving her arms and legs and occasionally seem to be restless with no electrographic seizure correlate.    Clinical Interpretation:  This prolonged video EEG is abnormal.  There is moderate generalized slowing typically seen in encephalopathies. There is no focal asymmetry, seizures or epileptiform discharges seen.       Medications:  Current Facility-Administered Medications   Medication Dose Route Frequency    sodium chloride flush 0.9 % injection 5-40 mL  5-40 mL IntraVENous 2 times per day    sodium chloride flush 0.9 % injection 5-40 mL  5-40 mL IntraVENous PRN    0.9 % sodium chloride infusion   IntraVENous PRN    lidocaine PF 1 %  0.9 % injection 5-40 mL  5-40 mL IntraVENous PRN    0.9 % sodium chloride infusion   IntraVENous PRN    ondansetron (ZOFRAN-ODT) disintegrating tablet 4 mg  4 mg Oral Q8H PRN    Or    ondansetron (ZOFRAN) injection 4 mg  4 mg IntraVENous Q6H PRN    polyethylene glycol (GLYCOLAX) packet 17 g  17 g Oral Daily PRN    acetaminophen (TYLENOL) tablet 650 mg  650 mg Oral Q6H PRN    Or    acetaminophen (TYLENOL) suppository 650 mg  650 mg Rectal Q6H PRN    apixaban (ELIQUIS) tablet 5 mg  5 mg Oral BID    [Held by provider] gabapentin (NEURONTIN) capsule 100 mg  100 mg Oral TID    levothyroxine (SYNTHROID) tablet 75 mcg  75 mcg Oral QAM AC    losartan (COZAAR) tablet 25 mg  25 mg Oral Daily    rosuvastatin (CRESTOR) tablet 5 mg  5 mg Oral Nightly

## 2025-06-14 NOTE — PROGRESS NOTES
[facility]  [address] [place] [address] [phone]        Hospitalist Progress Note      NAME: Malini Griggs   :  1945  MRM:  [id number]    Date/Time of service: 2025  10:58 AM       Subjective:     AFwRVR overnight. Received another Amio bolus, but this infiltrated. She was asymptomatic. This morning again another run of AFwRVR. Again, no symptoms. She feels ok and wants to get out of bed to work with PT       Objective:       Vitals:       Last 24hrs VS reviewed since prior progress note. Most recent are:    Vitals:    25 0747   BP: (!) 129/108   Pulse: 65   Resp:    Temp:    SpO2: 91%     SpO2 Readings from Last 6 Encounters:   25 91%   23 96%   23 99%        No intake or output data in the 24 hours ending 25 1058         Exam:     Physical Exam:    Gen:  elderly, obese, NAD  HEENT:  Pink conjunctivae, PERRL, hearing intact to voice, moist mucous membranes  Neck:  Supple, without masses, thyroid non-tender  Resp:  No accessory muscle use, clear breath sounds without wheezes rales or rhonchi  Card:  irreg irreg tachy  Abd:  Soft, non-tender, non-distended, normoactive bowel sounds are present  Musc:  No cyanosis or clubbing  Skin:  RUE w/ erythema, swelling, pain.  No drainage   Neuro:   grossly normal  Psych: pleasant, alert, oriented    Medications Reviewed: (see below)    Lab Data Reviewed: (see below)    ______________________________________________________________________    Medications:     Current Facility-Administered Medications   Medication Dose Route Frequency    sodium chloride flush 0.9 % injection 5-40 mL  5-40 mL IntraVENous 2 times per day    sodium chloride flush 0.9 % injection 5-40 mL  5-40 mL IntraVENous PRN    0.9 % sodium chloride infusion   IntraVENous PRN    lidocaine PF 1 % injection 50 mg  50 mg IntraDERmal Once    sodium chloride flush 0.9 % injection 5-40 mL  5-40 mL IntraVENous 2 times per  day    sodium chloride flush 0.9 % injection 5-40 mL  5-40 mL IntraVENous PRN    0.9 % sodium chloride infusion   IntraVENous PRN    sodium zirconium cyclosilicate (LOKELMA) oral suspension 10 g  10 g Oral Once    DULoxetine (CYMBALTA) extended release capsule 60 mg  60 mg Oral Daily    [Held by provider] diazePAM (VALIUM) injection 5 mg  5 mg IntraVENous Q6H PRN    sodium zirconium cyclosilicate (LOKELMA) oral suspension 10 g  10 g Oral Once    albumin human 25% IV solution 12.5 g  12.5 g IntraVENous PRN    traMADol (ULTRAM) tablet 50 mg  50 mg Oral Q12H PRN    [Held by provider] insulin glargine (LANTUS) injection vial 10 Units  10 Units SubCUTAneous Nightly    HYDROmorphone (DILAUDID) injection 0.5 mg  0.5 mg IntraVENous Q4H PRN    insulin lispro (HUMALOG,ADMELOG) injection vial 0-4 Units  0-4 Units SubCUTAneous 4x Daily AC & HS    glucose chewable tablet 16 g  4 tablet Oral PRN    dextrose bolus 10% 125 mL  125 mL IntraVENous PRN    Or    dextrose bolus 10% 250 mL  250 mL IntraVENous PRN    glucagon injection 1 mg  1 mg SubCUTAneous PRN    dextrose 10 % infusion   IntraVENous Continuous PRN    carvedilol (COREG) tablet 6.25 mg  6.25 mg Oral BID    epoetin jay-epbx (RETACRIT) injection 3,000 Units  3,000 Units SubCUTAneous Once per day on Tuesday Thursday Saturday    ferrous sulfate (IRON 325) tablet 325 mg  325 mg Oral QHS    sevelamer (RENVELA) tablet 800 mg  800 mg Oral TID WC    docusate sodium (COLACE) capsule 100 mg  100 mg Oral Every Other Day    sodium chloride flush 0.9 % injection 5-40 mL  5-40 mL IntraVENous 2 times per day    sodium chloride flush 0.9 % injection 5-40 mL  5-40 mL IntraVENous PRN    0.9 % sodium chloride infusion   IntraVENous PRN    ondansetron (ZOFRAN-ODT) disintegrating tablet 4 mg  4 mg Oral Q8H PRN    Or    ondansetron (ZOFRAN) injection 4 mg  4 mg IntraVENous Q6H PRN    polyethylene glycol (GLYCOLAX) packet 17 g  17 g Oral Daily PRN    acetaminophen (TYLENOL) tablet 650 mg  650

## 2025-06-14 NOTE — PROGRESS NOTES
2102  Patient blood pressure is soft.  Scheduled to get coreg 6.25 mg.  Discussed with Ludy KIMBALL.  Advised ok to give.    2108  Patient requesting something to help her sleep.  Discussed with Ludy KIMBALL.      2307  Patient seems confused again.  Not being cooperative with orientation questions.  Patient was pleasant earlier but now telling me to go away.      0515  Patient seems to be oriented and cooperative again.  When she is with it she complains of a headache.  Heart rate elevated.  Obtaining a EKG.  Discussed with Ludy KIMBALL.    0558  Patient has been awake all night.    0622  IV infiltrated.  Patient received 60 mL.  Discussed with pharmacy and Ludy KIMBALL.    0647  RRT unable to obtain IV access.  Discussed  with Ludy KIMBALL.    0706  Bedside and Verbal shift change report given to Leticia (oncoming nurse) by Jovita (offgoing nurse). Report included the following information Nurse Handoff Report.

## 2025-06-14 NOTE — PROGRESS NOTES
Rapid Called at 0739    Responded to RRT at 0739 for Tachycardia    RRT for Afib in the 130's.  Pt has no IV access awaiting Dynamic Access.  Provider notified and suggested PO carvedilol until IV access can be obtained.  Primary RN aware.  Pt moved to a monitored room until IV access can be obtained.  RRT clear.    Provider at bedside: Provider Aware  Interventions ordered: Other (Comment)  Sepsis Suspected: No  Transfer to Higher Level of Care: NO  Blood Glucose: NA     Vitals:    06/14/25 0747   BP: (!) 129/108   Pulse: 65   Resp:    Temp:    SpO2: 91%        Rapid Ended at 0750  RRT RN assisted with transport to accepting unit No.    Miguelito Rowland RN

## 2025-06-14 NOTE — SIGNIFICANT EVENT
Called to bedside for rapid HR, per nursing pt has been awake and talking all night, noted on tele this am to be tachy with irregular rate- pt seen at bedside , she denies any associated CP, SOB or nausea.     Neuro: Alert and awake, talkative  CV: HR irregular, + radial pulses,   RESP: Lungs diminished, clear in upper lobes  GI: Abd soft, obese, + bowel sounds  : Deferred  INTEG: Warm and dry    Review of notes shows she had similar episode yesterday morning, was dosed with IV aminodarone, per nursing dose broke rate and nothing further was done. Will give a secound bolus now- if rarte breaks can do po dosing or may need IV therapy. Will pass to day team    EKG: Afib, rate of 130's- -    Will bolus and follow for effectiveness of med, will likely need to be placed on gtt or po dosing,     /75, Hr 136, RR 16-    0630: Notified by nursing of IV infiltrated after appx 60 ml infused. Spoke with pharmacy and have ordered antidote for nursing to administer. RN aware-    0652: Notified by nursing of loss of access, nursing not able to gain access and will have dynamic access placed line. Order in,

## 2025-06-14 NOTE — PROGRESS NOTES
12 hours of prolonged video EEG was reviewed. Patient awake, drowsy, asleep. Eating, talking and restless. No seizures seen. Mainly moderate generalized slowing as seen in encephalopathies.    06/14/2025  Prolonged video EEG downloaded until 5 am this morning revealed no seizures. Ongoing EEG reviewed until 910 am shows no seizures.

## 2025-06-15 LAB
EKG DIAGNOSIS: NORMAL
EKG DIAGNOSIS: NORMAL
EKG Q-T INTERVAL: 284 MS
EKG Q-T INTERVAL: 370 MS
EKG QRS DURATION: 116 MS
EKG QRS DURATION: 130 MS
EKG QTC CALCULATION (BAZETT): 433 MS
EKG QTC CALCULATION (BAZETT): 550 MS
EKG R AXIS: 92 DEGREES
EKG R AXIS: 93 DEGREES
EKG T AXIS: 23 DEGREES
EKG T AXIS: 23 DEGREES
EKG VENTRICULAR RATE: 133 BPM
EKG VENTRICULAR RATE: 140 BPM
GLUCOSE BLD STRIP.AUTO-MCNC: 114 MG/DL (ref 65–117)
GLUCOSE BLD STRIP.AUTO-MCNC: 140 MG/DL (ref 65–117)
GLUCOSE BLD STRIP.AUTO-MCNC: 147 MG/DL (ref 65–117)
GLUCOSE BLD STRIP.AUTO-MCNC: 180 MG/DL (ref 65–117)
GLUCOSE BLD STRIP.AUTO-MCNC: 193 MG/DL (ref 65–117)
SERVICE CMNT-IMP: ABNORMAL
SERVICE CMNT-IMP: NORMAL

## 2025-06-15 PROCEDURE — 6360000002 HC RX W HCPCS: Performed by: NURSE PRACTITIONER

## 2025-06-15 PROCEDURE — 97116 GAIT TRAINING THERAPY: CPT

## 2025-06-15 PROCEDURE — 93005 ELECTROCARDIOGRAM TRACING: CPT | Performed by: STUDENT IN AN ORGANIZED HEALTH CARE EDUCATION/TRAINING PROGRAM

## 2025-06-15 PROCEDURE — 2500000003 HC RX 250 WO HCPCS: Performed by: INTERNAL MEDICINE

## 2025-06-15 PROCEDURE — 97530 THERAPEUTIC ACTIVITIES: CPT

## 2025-06-15 PROCEDURE — 2500000003 HC RX 250 WO HCPCS: Performed by: NURSE PRACTITIONER

## 2025-06-15 PROCEDURE — 6370000000 HC RX 637 (ALT 250 FOR IP): Performed by: HOSPITALIST

## 2025-06-15 PROCEDURE — 6370000000 HC RX 637 (ALT 250 FOR IP): Performed by: INTERNAL MEDICINE

## 2025-06-15 PROCEDURE — 2500000003 HC RX 250 WO HCPCS: Performed by: HOSPITALIST

## 2025-06-15 PROCEDURE — 93010 ELECTROCARDIOGRAM REPORT: CPT | Performed by: INTERNAL MEDICINE

## 2025-06-15 PROCEDURE — 94761 N-INVAS EAR/PLS OXIMETRY MLT: CPT

## 2025-06-15 PROCEDURE — 97535 SELF CARE MNGMENT TRAINING: CPT

## 2025-06-15 PROCEDURE — 2060000000 HC ICU INTERMEDIATE R&B

## 2025-06-15 PROCEDURE — 82962 GLUCOSE BLOOD TEST: CPT

## 2025-06-15 PROCEDURE — 2700000000 HC OXYGEN THERAPY PER DAY

## 2025-06-15 PROCEDURE — 36556 INSERT NON-TUNNEL CV CATH: CPT

## 2025-06-15 PROCEDURE — 6370000000 HC RX 637 (ALT 250 FOR IP): Performed by: STUDENT IN AN ORGANIZED HEALTH CARE EDUCATION/TRAINING PROGRAM

## 2025-06-15 RX ORDER — LORAZEPAM 1 MG/1
1 TABLET ORAL ONCE
Status: COMPLETED | OUTPATIENT
Start: 2025-06-15 | End: 2025-06-15

## 2025-06-15 RX ORDER — OLANZAPINE 5 MG/1
5 TABLET, ORALLY DISINTEGRATING ORAL ONCE
Status: COMPLETED | OUTPATIENT
Start: 2025-06-15 | End: 2025-06-15

## 2025-06-15 RX ORDER — IBUPROFEN 400 MG/1
400 TABLET, FILM COATED ORAL EVERY 6 HOURS PRN
Status: DISCONTINUED | OUTPATIENT
Start: 2025-06-15 | End: 2025-06-19 | Stop reason: HOSPADM

## 2025-06-15 RX ORDER — OLANZAPINE 5 MG/1
5 TABLET, FILM COATED ORAL NIGHTLY PRN
Status: DISCONTINUED | OUTPATIENT
Start: 2025-06-15 | End: 2025-06-19 | Stop reason: HOSPADM

## 2025-06-15 RX ORDER — ZIPRASIDONE MESYLATE 20 MG/ML
10 INJECTION, POWDER, LYOPHILIZED, FOR SOLUTION INTRAMUSCULAR ONCE
Status: DISCONTINUED | OUTPATIENT
Start: 2025-06-15 | End: 2025-06-15

## 2025-06-15 RX ORDER — AMIODARONE HYDROCHLORIDE 200 MG/1
200 TABLET ORAL 2 TIMES DAILY
Status: DISCONTINUED | OUTPATIENT
Start: 2025-06-15 | End: 2025-06-16

## 2025-06-15 RX ORDER — KETOROLAC TROMETHAMINE 30 MG/ML
15 INJECTION, SOLUTION INTRAMUSCULAR; INTRAVENOUS ONCE
Status: DISCONTINUED | OUTPATIENT
Start: 2025-06-15 | End: 2025-06-15

## 2025-06-15 RX ORDER — MECLIZINE HCL 12.5 MG 12.5 MG/1
12.5 TABLET ORAL 3 TIMES DAILY PRN
Status: DISCONTINUED | OUTPATIENT
Start: 2025-06-15 | End: 2025-06-19 | Stop reason: HOSPADM

## 2025-06-15 RX ADMIN — SODIUM CHLORIDE, PRESERVATIVE FREE 10 ML: 5 INJECTION INTRAVENOUS at 20:51

## 2025-06-15 RX ADMIN — CARVEDILOL 6.25 MG: 6.25 TABLET, FILM COATED ORAL at 08:52

## 2025-06-15 RX ADMIN — IBUPROFEN 400 MG: 200 TABLET, FILM COATED ORAL at 12:23

## 2025-06-15 RX ADMIN — INSULIN LISPRO 1 UNITS: 100 INJECTION, SOLUTION INTRAVENOUS; SUBCUTANEOUS at 12:23

## 2025-06-15 RX ADMIN — AMIODARONE HYDROCHLORIDE 200 MG: 200 TABLET ORAL at 09:59

## 2025-06-15 RX ADMIN — OLANZAPINE 2.5 MG: 10 INJECTION, POWDER, FOR SOLUTION INTRAMUSCULAR at 06:06

## 2025-06-15 RX ADMIN — SODIUM CHLORIDE, PRESERVATIVE FREE 10 ML: 5 INJECTION INTRAVENOUS at 20:50

## 2025-06-15 RX ADMIN — WATER 5 MG: 1 INJECTION INTRAMUSCULAR; INTRAVENOUS; SUBCUTANEOUS at 21:04

## 2025-06-15 RX ADMIN — SEVELAMER CARBONATE 800 MG: 800 TABLET, FILM COATED ORAL at 12:24

## 2025-06-15 RX ADMIN — INSULIN LISPRO 1 UNITS: 100 INJECTION, SOLUTION INTRAVENOUS; SUBCUTANEOUS at 08:52

## 2025-06-15 RX ADMIN — OLANZAPINE 5 MG: 5 TABLET, ORALLY DISINTEGRATING ORAL at 13:35

## 2025-06-15 RX ADMIN — ACETAMINOPHEN 650 MG: 325 TABLET ORAL at 01:03

## 2025-06-15 RX ADMIN — LEVOTHYROXINE SODIUM 75 MCG: 0.03 TABLET ORAL at 07:27

## 2025-06-15 RX ADMIN — DULOXETINE 60 MG: 30 CAPSULE, DELAYED RELEASE ORAL at 08:52

## 2025-06-15 RX ADMIN — SEVELAMER CARBONATE 800 MG: 800 TABLET, FILM COATED ORAL at 08:52

## 2025-06-15 RX ADMIN — APIXABAN 5 MG: 5 TABLET, FILM COATED ORAL at 08:52

## 2025-06-15 RX ADMIN — ACETAMINOPHEN 650 MG: 325 TABLET ORAL at 10:07

## 2025-06-15 RX ADMIN — MECLIZINE HYDROCHLORIDE 12.5 MG: 25 TABLET ORAL at 10:52

## 2025-06-15 RX ADMIN — LORAZEPAM 1 MG: 1 TABLET ORAL at 15:16

## 2025-06-15 ASSESSMENT — PAIN DESCRIPTION - LOCATION
LOCATION: HEAD
LOCATION: HEAD

## 2025-06-15 ASSESSMENT — PAIN SCALES - WONG BAKER
WONGBAKER_NUMERICALRESPONSE: NO HURT

## 2025-06-15 ASSESSMENT — PAIN SCALES - GENERAL
PAINLEVEL_OUTOF10: 9
PAINLEVEL_OUTOF10: 7
PAINLEVEL_OUTOF10: 3

## 2025-06-15 NOTE — PROGRESS NOTES
0735 receive pt with no IV access this morning. Amio gtt not running. Pt agitated and exhibiting signs of confusion. Provider notified.    0852 Provider is aware of no IV access and states that is fine.    1516 Attempt to place pt on CPAP. Pt became more agitated and ripping mask off. Place on 2L NC for comfort.

## 2025-06-15 NOTE — PLAN OF CARE
Problem: Physical Therapy - Adult  Goal: By Discharge: Performs mobility at highest level of function for planned discharge setting.  See evaluation for individualized goals.  Description: FUNCTIONAL STATUS PRIOR TO ADMISSION: Patient was modified independent using a rollator for functional mobility. Also has a manual w/c for community mobility.     HOME SUPPORT PRIOR TO ADMISSION: The patient lived with her granddaughter who has been assisting with dressing and bed mobility      Physical Therapy Goals  Initiated 6/6/2025; continue same goals as of 6/15/2025    1.  Patient will move from supine to sit and sit to supine, scoot up and down, and roll side to side in bed with minimal assistance within 7 day(s).    2.  Patient will perform sit to stand with modified independence within 7 day(s).  3.  Patient will transfer from bed to chair and chair to bed with modified independence using the least restrictive device within 7 day(s).  4.  Patient will ambulate with modified independence for 75 feet with the least restrictive device within 7 day(s).     Outcome: Progressing   PHYSICAL THERAPY TREATMENT: WEEKLY REASSESSMENT    Patient: Malini Griggs (79 y.o. female)  Date: 6/15/2025  Primary Diagnosis: End stage renal disease ([facility]) [N18.6]  Altered mental status [R41.82]  RBBB [I45.10]  Syncope, unspecified syncope type [R55]  SIRS (systemic inflammatory response syndrome) ([facility]) [R65.10]  Procedure(s) (LRB):  EXPLORATION RIGHT ARM (Right)     Precautions: Restrictions/Precautions  Restrictions/Precautions: General Precautions          ASSESSMENT :  Patient continues to benefit from skilled PT services and is progressing towards goals. Communicated with nurse and MD cleared for therapy. Patient more awake family at bedside agreed with all goals set for the patient. Mobilized today with max assist x 2. Rolled on the edge of bed, supine to it sit to stand. Ambulate lateral side steps towards the head of bed with rolling  assistance  Interventions: Safety awareness training  Sit to Stand: Substantial/Maximal assistance;2 Person assistance  Stand to Sit: Substantial/Maximal assistance  Balance:               Balance  Sitting: High guard  Standing: Impaired;With support  Standing - Static: Fair  Standing - Dynamic: Poor  Ambulation/Gait Training:                       Gait  Gait Training: Yes  Overall Level of Assistance: Substantial/Maximal assistance;2 Person assistance  Distance (ft): 1 Feet (lateral side steps towards the head of bed)  Assistive Device: Gait belt;Walker, rolling  Interventions: Safety awareness training  Base of Support: Widened  Speed/[place]: Fluctuations;Slow  Step Length: Right shortened;Left shortened  Gait Abnormalities: Path deviations;Step to gait                                                                                                                                                                                                                                                            [facility] AM-PAC®      Basic Mobility Inpatient Short Form (6-Clicks) Version 2  How much HELP from another person do you currently need... (If the patient hasn't done an activity recently, how much help from another person do you think they would need if they tried?) Total A Lot A Little None   1.  Turning from your back to your side while in a flat bed without using bedrails? []  1 [x]  2 []  3  []  4   2.  Moving from lying on your back to sitting on the side of a flat bed without using bedrails? []  1 [x]  2 []  3  []  4   3.  Moving to and from a bed to a chair (including a wheelchair)? []  1 [x]  2 []  3  []  4   4. Standing up from a chair using your arms (e.g. wheelchair or bedside chair)? []  1 [x]  2 []  3  []  4   5.  Walking in hospital room? []  1 [x]  2 []  3  []  4   6.  Climbing 3-5 steps with a railing? []  1 []  2 []  3  []  4     Raw Score: 10/24                            Cutoff score

## 2025-06-15 NOTE — PLAN OF CARE
Problem: Occupational Therapy - Adult  Goal: By Discharge: Performs self-care activities at highest level of function for planned discharge setting.  See evaluation for individualized goals.  Description: FUNCTIONAL STATUS PRIOR TO ADMISSION:  Patient lives in one level senior apartment - grab bars in bathroom. Uses rollator for mobility,. Has w/c if needed   Receives Help From: Family, Prior Level of Assist for ADLs: Needs assistance (assistance with dressing),  ,  ,  ,  ,  ,  ,  , Prior Level of Assist for Transfers: Independent, Active : No       Occupational Therapy Goals:  Initiated 6/6/2025; Reviewed 6/15/2025 for weekly reassessment, continue goals  1.  Patient will perform grooming in stand with Contact Guard Assist within 7 day(s).  2.  Patient will perform bathing with Minimal Assist within 7 day(s).  3.  Patient will perform upper body dressing with Minimal Assist within 7 day(s).  4.  Patient will perform toilet transfers with Stand by Assist  within 7 day(s).  5.  Patient will perform all aspects of toileting with Stand by Assist within 7 day(s).  6.  Patient will perform RUE HEP with stand by assist within 7 days.  Outcome: Progressing  OCCUPATIONAL THERAPY TREATMENT: WEEKLY REASSESSMENT    Patient: Malini Griggs (79 y.o. female)  Date: 6/15/2025  Primary Diagnosis: End stage renal disease (H[facility]) [N18.6]  Altered mental status [R41.82]  RBBB [I45.10]  Syncope, unspecified syncope type [R55]  SIRS (systemic inflammatory response syndrome) (H[facility]) [R65.10]  Procedure(s) (LRB):  EXPLORATION RIGHT ARM (Right)     Precautions: General Precautions                Chart, occupational therapy assessment, plan of care, and goals were reviewed.    ASSESSMENT  Patient continues to benefit from skilled OT services and is slowly progressing towards goals. She was originally evaluated by acute OT on 6/6/25, with multiple defers of therapy since then due to lethargy, agitation, or tachycardia and HD. Today pt

## 2025-06-15 NOTE — PROGRESS NOTES
DANE HENSON [facility]  [address] [place] [address] [phone]        Hospitalist Progress Note      NAME: Malini Griggs   :  1945  MRM:  [id number]    Date/Time of service: 6/15/2025  1:51 PM       Subjective:     Agitated overnight, but more clear this morning. This is actually the clearest I have seen her but she still is somewhat confused. Family at bedside. Patient says she wants to go home. She is oriented x 3, but doesn't comprehend the degree of her deconditioning.        Objective:       Vitals:       Last 24hrs VS reviewed since prior progress note. Most recent are:    Vitals:    06/15/25 1135   BP: (!) 113/36   Pulse:    Resp: 16   Temp: 98.6 °F (37 °C)   SpO2:      SpO2 Readings from Last 6 Encounters:   06/15/25 98%   23 96%   23 99%        No intake or output data in the 24 hours ending 06/15/25 1351         Exam:     Physical Exam:    Gen:  elderly, obese, NAD, confused  HEENT:  Pink conjunctivae, PERRL, hearing intact to voice, moist mucous membranes  Neck:  Supple, without masses, thyroid non-tender  Resp:  No accessory muscle use, clear breath sounds without wheezes rales or rhonchi  Card:  irreg irreg   Abd:  Soft, non-tender, non-distended, normoactive bowel sounds are present  Musc:  No cyanosis or clubbing  Skin:  RUE w/ erythema, swelling, pain.  No drainage   Neuro:   grossly normal  Psych: pleasant, alert, oriented but not contextual    Medications Reviewed: (see below)    Lab Data Reviewed: (see below)    ______________________________________________________________________    Medications:     Current Facility-Administered Medications   Medication Dose Route Frequency    meclizine (ANTIVERT) tablet 12.5 mg  12.5 mg Oral TID PRN    amiodarone (CORDARONE) tablet 200 mg  200 mg Oral BID    ibuprofen (ADVIL;MOTRIN) tablet 400 mg  400 mg Oral Q6H PRN    OLANZapine (ZYPREXA) tablet 5 mg  5 mg Oral Nightly PRN    sodium chloride flush

## 2025-06-16 ENCOUNTER — APPOINTMENT (OUTPATIENT)
Facility: HOSPITAL | Age: 80
DRG: 314 | End: 2025-06-16
Payer: MEDICARE

## 2025-06-16 LAB
ALBUMIN SERPL-MCNC: 3.1 G/DL (ref 3.5–5)
ALBUMIN/GLOB SERPL: 0.7 (ref 1.1–2.2)
ALP SERPL-CCNC: 54 U/L (ref 45–117)
ALT SERPL-CCNC: 18 U/L (ref 12–78)
ANION GAP SERPL CALC-SCNC: 12 MMOL/L (ref 2–12)
ANION GAP SERPL CALC-SCNC: 18 MMOL/L (ref 2–12)
ARTERIAL PATENCY WRIST A: ABNORMAL
AST SERPL-CCNC: 33 U/L (ref 15–37)
BASE DEFICIT BLD-SCNC: 1.3 MMOL/L
BASE DEFICIT BLD-SCNC: 2.1 MMOL/L
BASE DEFICIT BLDV-SCNC: 3.1 MMOL/L
BASOPHILS # BLD: 0 K/UL (ref 0–0.1)
BASOPHILS NFR BLD: 0 % (ref 0–1)
BDY SITE: ABNORMAL
BILIRUB SERPL-MCNC: 0.7 MG/DL (ref 0.2–1)
BUN SERPL-MCNC: 40 MG/DL (ref 6–20)
BUN SERPL-MCNC: 44 MG/DL (ref 6–20)
BUN/CREAT SERPL: 6 (ref 12–20)
BUN/CREAT SERPL: 6 (ref 12–20)
CALCIUM SERPL-MCNC: 9.7 MG/DL (ref 8.5–10.1)
CALCIUM SERPL-MCNC: 9.8 MG/DL (ref 8.5–10.1)
CHLORIDE SERPL-SCNC: 96 MMOL/L (ref 97–108)
CHLORIDE SERPL-SCNC: 97 MMOL/L (ref 97–108)
CO2 SERPL-SCNC: 21 MMOL/L (ref 21–32)
CO2 SERPL-SCNC: 25 MMOL/L (ref 21–32)
CREAT SERPL-MCNC: 6.72 MG/DL (ref 0.55–1.02)
CREAT SERPL-MCNC: 7.35 MG/DL (ref 0.55–1.02)
D DIMER PPP FEU-MCNC: 6 MG/L FEU (ref 0–0.65)
DIFFERENTIAL METHOD BLD: ABNORMAL
EOSINOPHIL # BLD: 0 K/UL (ref 0–0.4)
EOSINOPHIL NFR BLD: 0 % (ref 0–7)
ERYTHROCYTE [DISTWIDTH] IN BLOOD BY AUTOMATED COUNT: 15.3 % (ref 11.5–14.5)
ERYTHROCYTE [DISTWIDTH] IN BLOOD BY AUTOMATED COUNT: 15.8 % (ref 11.5–14.5)
GAS FLOW.O2 O2 DELIVERY SYS: ABNORMAL
GAS FLOW.O2 SETTING OXYMISER: 20 BPM
GLOBULIN SER CALC-MCNC: 4.6 G/DL (ref 2–4)
GLUCOSE BLD STRIP.AUTO-MCNC: 130 MG/DL (ref 65–117)
GLUCOSE BLD STRIP.AUTO-MCNC: 159 MG/DL (ref 65–117)
GLUCOSE BLD STRIP.AUTO-MCNC: 170 MG/DL (ref 65–117)
GLUCOSE BLD STRIP.AUTO-MCNC: 193 MG/DL (ref 65–117)
GLUCOSE SERPL-MCNC: 151 MG/DL (ref 65–100)
GLUCOSE SERPL-MCNC: 195 MG/DL (ref 65–100)
HCO3 BLD-SCNC: 20.5 MMOL/L (ref 21–28)
HCO3 BLD-SCNC: 25 MMOL/L (ref 21–28)
HCO3 BLDV-SCNC: 21.5 MMOL/L (ref 23–28)
HCT VFR BLD AUTO: 24.3 % (ref 35–47)
HCT VFR BLD AUTO: 26.1 % (ref 35–47)
HGB BLD-MCNC: 7.5 G/DL (ref 11.5–16)
HGB BLD-MCNC: 7.9 G/DL (ref 11.5–16)
IMM GRANULOCYTES # BLD AUTO: 0 K/UL
IMM GRANULOCYTES NFR BLD AUTO: 0 %
LACTATE SERPL-SCNC: 1.5 MMOL/L (ref 0.4–2)
LACTATE SERPL-SCNC: 6.6 MMOL/L (ref 0.4–2)
LYMPHOCYTES # BLD: 1.98 K/UL (ref 0.8–3.5)
LYMPHOCYTES NFR BLD: 11 % (ref 12–49)
MAGNESIUM SERPL-MCNC: 2.6 MG/DL (ref 1.6–2.4)
MCH RBC QN AUTO: 27.9 PG (ref 26–34)
MCH RBC QN AUTO: 28 PG (ref 26–34)
MCHC RBC AUTO-ENTMCNC: 30.3 G/DL (ref 30–36.5)
MCHC RBC AUTO-ENTMCNC: 30.9 G/DL (ref 30–36.5)
MCV RBC AUTO: 90.7 FL (ref 80–99)
MCV RBC AUTO: 92.2 FL (ref 80–99)
MONOCYTES # BLD: 1.62 K/UL (ref 0–1)
MONOCYTES NFR BLD: 9 % (ref 5–13)
NEUTS BAND NFR BLD MANUAL: 1 % (ref 0–6)
NEUTS SEG # BLD: 14.4 K/UL (ref 1.8–8)
NEUTS SEG NFR BLD: 79 % (ref 32–75)
NRBC # BLD: 0.28 K/UL (ref 0–0.01)
NRBC # BLD: 0.54 K/UL (ref 0–0.01)
NRBC BLD-RTO: 1.6 PER 100 WBC
NRBC BLD-RTO: 3.4 PER 100 WBC
O2/TOTAL GAS SETTING VFR VENT: 40 %
PAW @ MEAN EXP FLOW ON VENT: 12 CMH2O
PCO2 BLD: 26.2 MMHG (ref 35–48)
PCO2 BLD: 48.3 MMHG (ref 35–48)
PCO2 BLDV: 35.8 MMHG (ref 41–51)
PEEP RESPIRATORY: 7 CMH2O
PH BLD: 7.32 (ref 7.35–7.45)
PH BLD: 7.5 (ref 7.35–7.45)
PH BLDV: 7.39 (ref 7.32–7.42)
PLATELET # BLD AUTO: 303 K/UL (ref 150–400)
PLATELET # BLD AUTO: 388 K/UL (ref 150–400)
PMV BLD AUTO: 9 FL (ref 8.9–12.9)
PMV BLD AUTO: 9.5 FL (ref 8.9–12.9)
PO2 BLD: 157 MMHG (ref 83–108)
PO2 BLD: 193 MMHG (ref 83–108)
PO2 BLDV: 39 MMHG (ref 25–40)
POTASSIUM SERPL-SCNC: 4.5 MMOL/L (ref 3.5–5.1)
POTASSIUM SERPL-SCNC: 4.8 MMOL/L (ref 3.5–5.1)
PROCALCITONIN SERPL-MCNC: 13.36 NG/ML
PROT SERPL-MCNC: 7.7 G/DL (ref 6.4–8.2)
RBC # BLD AUTO: 2.68 M/UL (ref 3.8–5.2)
RBC # BLD AUTO: 2.83 M/UL (ref 3.8–5.2)
RBC MORPH BLD: ABNORMAL
SAO2 % BLD: 99.2 % (ref 92–97)
SAO2 % BLD: 99.8 % (ref 92–97)
SAO2 % BLDV: 73.8 % (ref 65–88)
SERVICE CMNT-IMP: ABNORMAL
SODIUM SERPL-SCNC: 134 MMOL/L (ref 136–145)
SODIUM SERPL-SCNC: 135 MMOL/L (ref 136–145)
SPECIMEN TYPE: ABNORMAL
TROPONIN I SERPL HS-MCNC: 228 NG/L (ref 0–51)
TROPONIN I SERPL HS-MCNC: 364 NG/L (ref 0–51)
VENTILATION MODE VENT: ABNORMAL
VT SETTING VENT: 350 ML
WBC # BLD AUTO: 15.9 K/UL (ref 3.6–11)
WBC # BLD AUTO: 18 K/UL (ref 3.6–11)

## 2025-06-16 PROCEDURE — 0BH17EZ INSERTION OF ENDOTRACHEAL AIRWAY INTO TRACHEA, VIA NATURAL OR ARTIFICIAL OPENING: ICD-10-PCS | Performed by: HOSPITALIST

## 2025-06-16 PROCEDURE — 71260 CT THORAX DX C+: CPT

## 2025-06-16 PROCEDURE — 02HV33Z INSERTION OF INFUSION DEVICE INTO SUPERIOR VENA CAVA, PERCUTANEOUS APPROACH: ICD-10-PCS | Performed by: HOSPITALIST

## 2025-06-16 PROCEDURE — 5A12012 PERFORMANCE OF CARDIAC OUTPUT, SINGLE, MANUAL: ICD-10-PCS | Performed by: INTERNAL MEDICINE

## 2025-06-16 PROCEDURE — 2700000000 HC OXYGEN THERAPY PER DAY

## 2025-06-16 PROCEDURE — 2000000000 HC ICU R&B

## 2025-06-16 PROCEDURE — 94002 VENT MGMT INPAT INIT DAY: CPT

## 2025-06-16 PROCEDURE — 2500000003 HC RX 250 WO HCPCS: Performed by: INTERNAL MEDICINE

## 2025-06-16 PROCEDURE — 2500000003 HC RX 250 WO HCPCS: Performed by: HOSPITALIST

## 2025-06-16 PROCEDURE — 82962 GLUCOSE BLOOD TEST: CPT

## 2025-06-16 PROCEDURE — 2500000003 HC RX 250 WO HCPCS: Performed by: NURSE PRACTITIONER

## 2025-06-16 PROCEDURE — 6360000002 HC RX W HCPCS: Performed by: INTERNAL MEDICINE

## 2025-06-16 PROCEDURE — 84484 ASSAY OF TROPONIN QUANT: CPT

## 2025-06-16 PROCEDURE — 82803 BLOOD GASES ANY COMBINATION: CPT

## 2025-06-16 PROCEDURE — 5A1945Z RESPIRATORY VENTILATION, 24-96 CONSECUTIVE HOURS: ICD-10-PCS | Performed by: HOSPITALIST

## 2025-06-16 PROCEDURE — 6370000000 HC RX 637 (ALT 250 FOR IP): Performed by: INTERNAL MEDICINE

## 2025-06-16 PROCEDURE — 6360000004 HC RX CONTRAST MEDICATION: Performed by: HOSPITALIST

## 2025-06-16 PROCEDURE — 36556 INSERT NON-TUNNEL CV CATH: CPT

## 2025-06-16 PROCEDURE — 84145 PROCALCITONIN (PCT): CPT

## 2025-06-16 PROCEDURE — 95711 VEEG 2-12 HR UNMONITORED: CPT

## 2025-06-16 PROCEDURE — 71045 X-RAY EXAM CHEST 1 VIEW: CPT

## 2025-06-16 PROCEDURE — 70450 CT HEAD/BRAIN W/O DYE: CPT

## 2025-06-16 PROCEDURE — 95706 EEG WO VID 2-12HR INTMT MNTR: CPT

## 2025-06-16 PROCEDURE — 74018 RADEX ABDOMEN 1 VIEW: CPT

## 2025-06-16 PROCEDURE — 85027 COMPLETE CBC AUTOMATED: CPT

## 2025-06-16 PROCEDURE — 83605 ASSAY OF LACTIC ACID: CPT

## 2025-06-16 PROCEDURE — 31500 INSERT EMERGENCY AIRWAY: CPT

## 2025-06-16 PROCEDURE — 36415 COLL VENOUS BLD VENIPUNCTURE: CPT

## 2025-06-16 PROCEDURE — 6370000000 HC RX 637 (ALT 250 FOR IP): Performed by: HOSPITALIST

## 2025-06-16 PROCEDURE — 85379 FIBRIN DEGRADATION QUANT: CPT

## 2025-06-16 PROCEDURE — 2500000003 HC RX 250 WO HCPCS

## 2025-06-16 PROCEDURE — 51798 US URINE CAPACITY MEASURE: CPT

## 2025-06-16 PROCEDURE — 83735 ASSAY OF MAGNESIUM: CPT

## 2025-06-16 PROCEDURE — 6360000002 HC RX W HCPCS: Performed by: HOSPITALIST

## 2025-06-16 PROCEDURE — 85025 COMPLETE CBC W/AUTO DIFF WBC: CPT

## 2025-06-16 PROCEDURE — 95957 EEG DIGITAL ANALYSIS: CPT

## 2025-06-16 PROCEDURE — 80053 COMPREHEN METABOLIC PANEL: CPT

## 2025-06-16 PROCEDURE — 92950 HEART/LUNG RESUSCITATION CPR: CPT

## 2025-06-16 PROCEDURE — 94761 N-INVAS EAR/PLS OXIMETRY MLT: CPT

## 2025-06-16 PROCEDURE — 3E043XZ INTRODUCTION OF VASOPRESSOR INTO CENTRAL VEIN, PERCUTANEOUS APPROACH: ICD-10-PCS | Performed by: HOSPITALIST

## 2025-06-16 RX ORDER — ATROPINE SULFATE 0.4 MG/ML
0.4 INJECTION, SOLUTION INTRAVENOUS ONCE
Status: DISCONTINUED | OUTPATIENT
Start: 2025-06-16 | End: 2025-06-16 | Stop reason: CLARIF

## 2025-06-16 RX ORDER — AMIODARONE HYDROCHLORIDE 200 MG/1
200 TABLET ORAL 2 TIMES DAILY
Status: DISCONTINUED | OUTPATIENT
Start: 2025-06-16 | End: 2025-06-19 | Stop reason: HOSPADM

## 2025-06-16 RX ORDER — NALOXONE HYDROCHLORIDE 0.4 MG/ML
INJECTION, SOLUTION INTRAMUSCULAR; INTRAVENOUS; SUBCUTANEOUS
Status: COMPLETED | OUTPATIENT
Start: 2025-06-16 | End: 2025-06-16

## 2025-06-16 RX ORDER — INDOMETHACIN 25 MG/1
CAPSULE ORAL
Status: COMPLETED | OUTPATIENT
Start: 2025-06-16 | End: 2025-06-16

## 2025-06-16 RX ORDER — ATROPINE SULFATE 0.1 MG/ML
0.4 INJECTION INTRAVENOUS ONCE
Status: DISCONTINUED | OUTPATIENT
Start: 2025-06-16 | End: 2025-06-16

## 2025-06-16 RX ORDER — NOREPINEPHRINE BITARTRATE 0.06 MG/ML
INJECTION, SOLUTION INTRAVENOUS
Status: DISPENSED
Start: 2025-06-16 | End: 2025-06-16

## 2025-06-16 RX ORDER — FENTANYL CITRATE 50 UG/ML
50 INJECTION, SOLUTION INTRAMUSCULAR; INTRAVENOUS EVERY 30 MIN PRN
Refills: 0 | Status: DISCONTINUED | OUTPATIENT
Start: 2025-06-16 | End: 2025-06-19 | Stop reason: HOSPADM

## 2025-06-16 RX ORDER — EPINEPHRINE IN SOD CHLOR,ISO 1 MG/10 ML
SYRINGE (ML) INTRAVENOUS
Status: COMPLETED | OUTPATIENT
Start: 2025-06-16 | End: 2025-06-16

## 2025-06-16 RX ORDER — NOREPINEPHRINE BITARTRATE 0.06 MG/ML
1-100 INJECTION, SOLUTION INTRAVENOUS CONTINUOUS
Status: DISCONTINUED | OUTPATIENT
Start: 2025-06-16 | End: 2025-06-19 | Stop reason: HOSPADM

## 2025-06-16 RX ORDER — MIDAZOLAM HYDROCHLORIDE 1 MG/ML
5 INJECTION, SOLUTION INTRAMUSCULAR; INTRAVENOUS ONCE
Status: COMPLETED | OUTPATIENT
Start: 2025-06-16 | End: 2025-06-16

## 2025-06-16 RX ORDER — PROPOFOL 10 MG/ML
5-50 INJECTION, EMULSION INTRAVENOUS CONTINUOUS
Status: DISCONTINUED | OUTPATIENT
Start: 2025-06-16 | End: 2025-06-19 | Stop reason: HOSPADM

## 2025-06-16 RX ORDER — IOPAMIDOL 755 MG/ML
80 INJECTION, SOLUTION INTRAVASCULAR
Status: COMPLETED | OUTPATIENT
Start: 2025-06-16 | End: 2025-06-16

## 2025-06-16 RX ORDER — NALOXONE HYDROCHLORIDE 0.4 MG/ML
INJECTION, SOLUTION INTRAMUSCULAR; INTRAVENOUS; SUBCUTANEOUS
Status: DISPENSED
Start: 2025-06-16 | End: 2025-06-16

## 2025-06-16 RX ORDER — ATROPINE SULFATE 0.1 MG/ML
INJECTION INTRAVENOUS
Status: DISCONTINUED
Start: 2025-06-16 | End: 2025-06-16 | Stop reason: WASHOUT

## 2025-06-16 RX ADMIN — SODIUM CHLORIDE, PRESERVATIVE FREE 10 ML: 5 INJECTION INTRAVENOUS at 20:38

## 2025-06-16 RX ADMIN — EPINEPHRINE 0.2 MG: 0.1 INJECTION, SOLUTION ENDOTRACHEAL; INTRACARDIAC; INTRAVENOUS at 09:39

## 2025-06-16 RX ADMIN — IOPAMIDOL 80 ML: 755 INJECTION, SOLUTION INTRAVENOUS at 15:00

## 2025-06-16 RX ADMIN — PROPOFOL 30 MCG/KG/MIN: 10 INJECTION, EMULSION INTRAVENOUS at 14:18

## 2025-06-16 RX ADMIN — AMIODARONE HYDROCHLORIDE 200 MG: 200 TABLET ORAL at 20:19

## 2025-06-16 RX ADMIN — PROPOFOL 20 MCG/KG/MIN: 10 INJECTION, EMULSION INTRAVENOUS at 10:14

## 2025-06-16 RX ADMIN — PROPOFOL 30 MCG/KG/MIN: 10 INJECTION, EMULSION INTRAVENOUS at 19:15

## 2025-06-16 RX ADMIN — EPINEPHRINE 1 MG: 0.1 INJECTION, SOLUTION ENDOTRACHEAL; INTRACARDIAC; INTRAVENOUS at 09:30

## 2025-06-16 RX ADMIN — SODIUM BICARBONATE 50 MEQ: 84 INJECTION, SOLUTION INTRAVENOUS at 09:33

## 2025-06-16 RX ADMIN — NALOXONE HYDROCHLORIDE 0.4 MG: 0.4 INJECTION, SOLUTION INTRAMUSCULAR; INTRAVENOUS; SUBCUTANEOUS at 09:27

## 2025-06-16 RX ADMIN — MIDAZOLAM 5 MG: 1 INJECTION INTRAMUSCULAR; INTRAVENOUS at 09:53

## 2025-06-16 RX ADMIN — NOREPINEPHRINE BITARTRATE 5 MCG/MIN: 64 SOLUTION INTRAVENOUS at 10:06

## 2025-06-16 RX ADMIN — INSULIN LISPRO 1 UNITS: 100 INJECTION, SOLUTION INTRAVENOUS; SUBCUTANEOUS at 16:21

## 2025-06-16 ASSESSMENT — PAIN SCALES - WONG BAKER
WONGBAKER_NUMERICALRESPONSE: NO HURT
WONGBAKER_NUMERICALRESPONSE: NO HURT

## 2025-06-16 ASSESSMENT — PAIN SCALES - GENERAL
PAINLEVEL_OUTOF10: 0

## 2025-06-16 ASSESSMENT — PULMONARY FUNCTION TESTS
PIF_VALUE: 22
PIF_VALUE: 29
PIF_VALUE: 28
PIF_VALUE: 21

## 2025-06-16 NOTE — PLAN OF CARE
Problem: Safety - Adult  Goal: Free from fall injury  6/16/2025 1222 by Maira Tsang RN  Outcome: Progressing  6/16/2025 0322 by Beto Steve RN  Outcome: Progressing     Problem: Skin/Tissue Integrity  Goal: Skin integrity remains intact  Description: 1.  Monitor for areas of redness and/or skin breakdown2.  Assess vascular access sites hourly3.  Every 4-6 hours minimum:  Change oxygen saturation probe site4.  Every 4-6 hours:  If on nasal continuous positive airway pressure, respiratory therapy assess nares and determine need for appliance change or resting period  6/16/2025 1222 by Maira Tsang RN  Outcome: Progressing  Flowsheets  Taken 6/16/2025 1217  Skin Integrity Remains Intact:   Monitor for areas of redness and/or skin breakdown   Assess vascular access sites hourly  Taken 6/16/2025 1200  Skin Integrity Remains Intact:   Monitor for areas of redness and/or skin breakdown   Assess vascular access sites hourly  6/16/2025 0322 by Beto Steve RN  Outcome: Progressing  Flowsheets (Taken 6/15/2025 2033)  Skin Integrity Remains Intact: Monitor for areas of redness and/or skin breakdown     Problem: ABCDS Injury Assessment  Goal: Absence of physical injury  6/16/2025 1222 by Maira Tsang RN  Outcome: Progressing  6/16/2025 0322 by Beto Steve RN  Outcome: Progressing     Problem: Safety - Medical Restraint  Goal: Remains free of injury from restraints (Restraint for Interference with Medical Device)  Description: INTERVENTIONS:  1. Determine that other, less restrictive measures have been tried or would not be effective before applying the restraint  2. Evaluate the patient's condition at the time of restraint application  3. Inform patient/family regarding the reason for restraint  4. Q2H: Monitor safety, psychosocial status, comfort, nutrition and hydration  Outcome: Progressing  Flowsheets  Taken 6/16/2025 1200  Remains free of injury from restraints (restraint for interference with

## 2025-06-16 NOTE — PROGRESS NOTES
Spiritual Health History and Assessment/Progress Note  [facility]    Crisis, Code Leonidas,  ,      Name: Malini Griggs MRN: [id number]    Age: 79 y.o.     Sex: female   Language: English   Yarsani: Taoism   Altered mental status     Date: 6/16/2025            Total Time Calculated: 34 min              Spiritual Assessment began in [facility] A[facility] INTENSIVE CARE UNIT        Referral/Consult From: Other    Encounter Overview/Reason: Crisis  Service Provided For: Patient    Sydnie, Belief, Meaning:   Patient unable to assess at this time  Family/Friends No family/friends present      Importance and Influence:  Patient unable to assess at this time  Family/Friends No family/friends present    Community:  Patient Other: Unable to determine  Family/Friends No family/friends present    Assessment and Plan of Care:     Patient Interventions include: Other: Unable to assess  Family/Friends Interventions include: No family/friends present    Patient Plan of Care: Other: Patient moved from 319 to 471.  Family/Friends Plan of Care: No family/friends present    Responded to code blue. No family present. Patient transferred to room 471 for care.  available upon request.    angel Escalona M.Div.   Pager: [phone])  on 6/16/2025 at 9:58 AM

## 2025-06-16 NOTE — PLAN OF CARE
Problem: Confusion  Goal: Confusion, delirium, dementia, or psychosis is improved or at baseline  Description: INTERVENTIONS:  1. Assess for possible contributors to thought disturbance, including medications, impaired vision or hearing, underlying metabolic abnormalities, dehydration, psychiatric diagnoses, and notify attending LIP  2. [place] high risk fall precautions, as indicated  3. Provide frequent short contacts to provide reality reorientation, refocusing and direction  4. Decrease environmental stimuli, including noise as appropriate  5. Monitor and intervene to maintain adequate nutrition, hydration, elimination, sleep and activity  6. If unable to ensure safety without constant attention obtain sitter and review sitter guidelines with assigned personnel  7. Initiate Psychosocial CNS and Spiritual Care consult, as indicated  Outcome: Not Progressing       Problem: Safety - Adult  Goal: Free from fall injury  Outcome: Progressing     Problem: Chronic Conditions and Co-morbidities  Goal: Patient's chronic conditions and co-morbidity symptoms are monitored and maintained or improved  Outcome: Progressing  Flowsheets (Taken 6/15/2025 2033)  Care Plan - Patient's Chronic Conditions and Co-Morbidity Symptoms are Monitored and Maintained or Improved: Monitor and assess patient's chronic conditions and comorbid symptoms for stability, deterioration, or improvement     Problem: Discharge Planning  Goal: Discharge to home or other facility with appropriate resources  Outcome: Progressing  Flowsheets (Taken 6/15/2025 2033)  Discharge to home or other facility with appropriate resources: Identify barriers to discharge with patient and caregiver     Problem: Pain  Goal: Verbalizes/displays adequate comfort level or baseline comfort level  Outcome: Progressing  Flowsheets (Taken 6/15/2025 2113)  Verbalizes/displays adequate comfort level or baseline comfort level:   Encourage patient to monitor pain and request  Adult  Goal: By Discharge: Performs self-care activities at highest level of function for planned discharge setting.  See evaluation for individualized goals.  Description: FUNCTIONAL STATUS PRIOR TO ADMISSION:  Patient lives in one level senior apartment - grab bars in bathroom. Uses rollator for mobility,. Has w/c if needed   Receives Help From: Family, Prior Level of Assist for ADLs: Needs assistance (assistance with dressing),  ,  ,  ,  ,  ,  ,  , Prior Level of Assist for Transfers: Independent, Active : No       Occupational Therapy Goals:  Initiated 6/6/2025; Reviewed 6/15/2025 for weekly reassessment, continue goals  1.  Patient will perform grooming in stand with Contact Guard Assist within 7 day(s).  2.  Patient will perform bathing with Minimal Assist within 7 day(s).  3.  Patient will perform upper body dressing with Minimal Assist within 7 day(s).  4.  Patient will perform toilet transfers with Stand by Assist  within 7 day(s).  5.  Patient will perform all aspects of toileting with Stand by Assist within 7 day(s).  6.  Patient will perform RUE HEP with stand by assist within 7 days.  6/15/2025 1503 by Vanessa Sorto, OT  Outcome: Progressing     Problem: Nutrition Deficit:  Goal: Optimize nutritional status  Outcome: Progressing     Problem: Safety - Violent/Self-destructive Restraint  Goal: Remains free of injury from restraints (Restraint for Violent/Self-Destructive Behavior)  Description: INTERVENTIONS:1. Determine that de-escalation and other, less restrictive measures have been tried or would not be effective before applying the restraint2. Identify and document the criteria for restraint3. Evaluate the patient's condition at the time of restraint application4. Inform patient/family regarding the reason for restraint/seclusion5. Q2H: Monitor comfort, nutrition and hydration needs6. Q15M: Perform safety checks including skin, circulation, sensory, respiratory and psychological status7.

## 2025-06-16 NOTE — CARE COORDINATION
I met with pt.'s son,Anson Tello in a supportive way.He mentioned to me that attending had mentioned code status discussions.  For this type of decision,son would like to confer with his  two sisters.    Son is Omer Plunkett @ [phone].He lives in [place], [place]  Sister ,Dianne,lives in [place], [place]    Mayela Medina RN

## 2025-06-16 NOTE — H&P
[facility] INITIAL ASSESSMENT.      Name: Malini Griggs   : 1945   MRN: [id number]   Date: 2025        Chief Complaint   Patient presents with    Altered Mental Status         HPI:     79-year-old female with history significant for hypertension, diabetes, end-stage renal disease, recent AV fistula,admitted with syncope, hypotension, concern for infected RUE fistula and course complicated by delirium and worsening encephalopathy.  Patient was having worsening delirium and received Zyprexa overnight.  In the morning, she was noted to be altered.  Later, she had a brief period of being agitated.  No further sedatives were given, however, during agitation she was noted to have progressive decline in mental status.  Later, she became fully altered with progressive bradycardia and hypotension.  There was concern for loss of falls so CODE BLUE was called.  Patient had CPR for 4 cycles receiving 3 epi, 1 bicarb.  ABG was obtained and ROSC was obtained.  Following obtaining ROSC, patient was noted to spontaneously have some muscular movements, however, she was very hypertensive.  Sedation was started.  Immediate central line access was obtained.        Problem list:     Cardiac arrest  Suspect respiratory arrest  Hypercapnic respiratory failure  Delirium and encephalopathy  End-stage renal disease  Anemia    Assessment/Plan:     # Cardiac arrest:  - Likely respiratory arrest.  - Keep normothermia  - Keep normotension  - Follow-up CT head  - Follow-up echo  - Continue amiodarone    # Encephalopathy:  - Likely related to delirium  - Likely compounded by overlapping hypercapnic respiratory failure from sedative meds.  - Hold off on sedation  - Continue propofol.  Wean sedation to Precedex once stable.    # Respiratory failure:  - Continue current ventilator settings  - VAP bundle  - ET tube retracted by 1 cm    # Shock:  - Wean vasopressors as tolerated  - Low suspicion for septic shock, likely  and curettage of uterus; Peritoneal catheter insertion; Peritoneal catheter removal; Dialysis Catheter Insertion; and Dialysis fistula creation (Left, 5/26/2023).      Home Medications:     Prior to Admission medications    Medication Sig Start Date End Date Taking? Authorizing Provider   apixaban (ELIQUIS) 5 MG TABS tablet Take 1 tablet by mouth 2 times daily 6/6/25  Yes Candy Mera ACNP   DULoxetine (CYMBALTA) 30 MG extended release capsule Take 1 capsule by mouth nightly   Yes Carl Chew MD   docusate sodium (COLACE) 100 MG capsule Take 1 capsule by mouth every other day   Yes Carl Chew MD   carvedilol (COREG) 25 MG tablet Take 0.5 tablets by mouth 2 times daily   Yes Automatic Reconciliation, Ar   DULoxetine (CYMBALTA) 60 MG extended release capsule Take 1 capsule by mouth daily   Yes Automatic Reconciliation, Ar   ferrous sulfate (IRON 325) 325 (65 Fe) MG tablet Take by mouth nightly   Yes Automatic Reconciliation, Ar   gabapentin (NEURONTIN) 100 MG capsule Take 1 capsule by mouth 2 times daily.   Yes Automatic Reconciliation, Ar   levothyroxine (SYNTHROID) 50 MCG tablet Take 1 tablet by mouth daily 4/6/10  Yes Automatic Reconciliation, Ar   insulin glargine (LANTUS) 100 UNIT/ML injection vial Inject 24 Units into the skin nightly    ProviderCarl MD         Allergies/Social/Family History:     Allergies   Allergen Reactions    Iron Anaphylaxis     LIQUID IRON    Amoxicillin Other (See Comments)     ineffective    Aspirin Hallucinations    Baclofen Other (See Comments)     hallucinations    Hydrochlorothiazide W-Spironolactone Other (See Comments)     CR rise    Spironolactone Other (See Comments)     Cr rise    Sulfa Antibiotics Nausea Only and Other (See Comments)     fatigue    Sulfur Other (See Comments)     Sick to stomach    Codeine Nausea And Vomiting      Social History     Tobacco Use    Smoking status: Former     Current packs/day: 0.00     Types: Cigarettes

## 2025-06-16 NOTE — PROGRESS NOTES
DANE HENSON [facility]  [address] [place] [address] [phone]      Medical Progress Note      NAME: Malini Griggs   :  1945  MRM:  [id number]    Date/Time of service: 2025         Subjective:     Chief Complaint:  Patient was personally seen and examined by me during this time period.  Chart reviewed.  Follow-up agitation.  Initially saw patient this morning at which time she would respond to sternal rub open her eyes and follow some commands.  However, patient was noted to be sleepy not able to stay awake.  Discussed with nursing at bedside; patient had received Zyprexa last evening due to agitation discussed possibly still affected by Zyprexa.  Discussed continued monitoring of patient's mentation and holding off on speech eval until patient is more awake.  Later in morning jose Aquino called NAVIN, and received call from nursing supervisor due to concern for increased agitation.  Discussed with nursing supervisor on phone that patient was sleepy this a.m. and I was hesitant to give further sedating medications.  Went to bedside and reevaluated patient.  Pulse ox showing 94% however concern for pleth and patient began to experience abnormal breathing pattern.  Discussed obtaining ABG, giving Narcan.  Patient was immediately hooked up to the monitor at which time she she was found to be bradycardic and order for atropine given.  At this time abnormal breathing pattern appear to continue.  Concerned that patient was going to lose pulses and therefore called CODE BLUE for pharmacy and anesthesia backup.  Patient lost pulses shortly after and CPR was started immediately; pulm began to establish airway.  Intensivist at bedside.  Patient received multiple rounds of CPR and epi after which ROSC was established.  1 dose of bicarb was also given per intensivist.  Patient stabilized and moved to ICU.  Discussed telemetry with nursing; patient had rhythm changes and flipped

## 2025-06-16 NOTE — PROGRESS NOTES
Pt was agitated and moving about bed trying to get out of bed. Pt was attempting to bite sitter who was at bedside trying to calm patient down.     Code NAVIN was called. Pt is still agitated. Provider notified. RRT at bedside. Pt assessed.    See orders: IM Zyprexa was ordered.  Nursing Delirium Screening scale was  updated. Score of a 8.    Alves-Baker Faces: Score 0  O2: 2L [place]

## 2025-06-16 NOTE — PROGRESS NOTES
0700:Bedside and Verbal shift change report given to Sophie BARGER (oncoming nurse) by Beto BARGER (offgoing nurse). Report included the following information Nurse Handoff Report.     1010:TRANSFER - OUT REPORT:    Verbal report given to Maira BARGER on Malini Griggs  being transferred to ICU for urgent transfer       Report consisted of patient's Situation, Background, Assessment and   Recommendations(SBAR).     Information from the following report(s) Nurse Handoff Report was reviewed with the receiving nurse.           Lines:   Extended Dwell Peripherial IV 06/15/25 Left Cephalic (Active)   Criteria for Appropriate Use Limited/no vessel suitable for conventional peripheral access 06/16/25 0300   Site Assessment Clean, dry & intact 06/16/25 0300   Phlebitis Assessment No symptoms 06/16/25 0300   Infiltration Assessment 0 06/16/25 0300   Line Status Blood return noted;Capped;Flushed;Normal saline locked 06/16/25 0300   Line Care Connections checked and tightened;Cap changed 06/16/25 0300   Dressing Type Transparent 06/16/25 0300   Dressing Status Clean, dry & intact 06/16/25 0300       Hemodialysis Central Access - Permanent/Tunneled Right Subclavian (Active)   $Tunneled Hemodialysis Catheter $Yes 06/15/25 1500   Continued need for line? Yes 06/15/25 1500   Site Assessment Clean;Dry 06/15/25 1500   Blue Lumen Status Normal saline locked 06/14/25 1345   Red Lumen Status Normal saline locked 06/14/25 1345   Line Care Connections checked and tightened 06/14/25 1345   Dressing Type Antimicrobial;Transparent 06/14/25 1345   Date of Last Dressing Change 06/10/25 06/14/25 1040   Dressing Status Clean, dry & intact 06/14/25 1040   Dressing Intervention New;Dressing changed 06/10/25 1930        Opportunity for questions and clarification was provided.      Patient transported with:  Monitor and Registered Nurse

## 2025-06-16 NOTE — PROCEDURES
PROCEDURE NOTE  Date: 6/16/2025   Name: Malini Griggs  YOB: 1945    Intubation    Date/Time: 6/16/2025 5:48 PM    Performed by: Kian Pascal MD  Authorized by: Kian Pascal MD  Consent: The procedure was performed in an emergent situation.  Time out: Immediately prior to procedure a \"time out\" was called to verify the correct patient, procedure, equipment, support staff and site/side marked as required.  Indications: respiratory failure  Intubation method: video-assisted  Patient status: unconscious  Tube size: 7.5 mm  Tube type: cuffed  Number of attempts: 1  Ventilation between attempts: BVM  Post-procedure assessment: CO2 detector  Breath sounds: equal  Cuff inflated: yes  ETT to teeth: 22 cm  Tube secured with: ETT da silva  Chest x-ray interpreted by me.  Chest x-ray findings: endotracheal tube in appropriate position  Patient tolerance: patient tolerated the procedure well with no immediate complications

## 2025-06-16 NOTE — PROGRESS NOTES
DANE HENSON [facility]    Malini Griggs  YOB: 1945          Assessment & Plan:     ESRD on HD TTS at [facility]  AMS  New RUE AVF with arm edema  DM2  Anemia  Mild hyperkalemia  S/p cardiac arrest 6/16 ROSC     Rec:  Plan for HD Tuesday if hemodynamics remains stable  HD TTS  FERNANDA TTS       Subjective:   CC: ESRD  HPI: patient is tx to ICU and intubated   S/p cardiac arrest with ROSC  K stable 4.5  Current Facility-Administered Medications   Medication Dose Route Frequency    naloxone (NARCAN) 0.4 MG/ML injection        atropine injection 0.4 mg  0.4 mg IntraVENous Once    propofol infusion  5-50 mcg/kg/min IntraVENous Continuous    norepinephrine (LEVOPHED) 16 mg in sodium chloride 0.9 % 250 mL infusion (premix)  1-100 mcg/min IntraVENous Continuous    meclizine (ANTIVERT) tablet 12.5 mg  12.5 mg Oral TID PRN    amiodarone (CORDARONE) tablet 200 mg  200 mg Oral BID    ibuprofen (ADVIL;MOTRIN) tablet 400 mg  400 mg Oral Q6H PRN    OLANZapine (ZYPREXA) tablet 5 mg  5 mg Oral Nightly PRN    phenol 1.4 % mouth spray 1 spray  1 spray Mouth/Throat Q2H PRN    sodium chloride flush 0.9 % injection 5-40 mL  5-40 mL IntraVENous 2 times per day    sodium chloride flush 0.9 % injection 5-40 mL  5-40 mL IntraVENous PRN    0.9 % sodium chloride infusion   IntraVENous PRN    lidocaine PF 1 % injection 50 mg  50 mg IntraDERmal Once    sodium chloride flush 0.9 % injection 5-40 mL  5-40 mL IntraVENous 2 times per day    sodium chloride flush 0.9 % injection 5-40 mL  5-40 mL IntraVENous PRN    0.9 % sodium chloride infusion   IntraVENous PRN    melatonin tablet 6 mg  6 mg Oral Nightly PRN    sodium zirconium cyclosilicate (LOKELMA) oral suspension 10 g  10 g Oral Once    DULoxetine (CYMBALTA) extended release capsule 60 mg  60 mg Oral Daily    [Held by provider] diazePAM (VALIUM) injection 5 mg  5 mg IntraVENous Q6H PRN    sodium zirconium cyclosilicate (LOKELMA) oral

## 2025-06-16 NOTE — CARE COORDINATION
Care Management Progress Note    Reason for Admission:   End stage renal disease (HCC) [N18.6]  Altered mental status [R41.82]  RBBB [I45.10]  Syncope, unspecified syncope type [R55]  SIRS (systemic inflammatory response syndrome) (HCC) [R65.10]  Procedure(s) (LRB):  EXPLORATION RIGHT ARM (Right)       Patient Admission Status: Inpatient  RUR: 20%  Hospitalization in the last 30 days (Readmission):  no        Transition of care plan:  [Medical]  Transferred to ICU after code Miles and then Code Blue  Pt was emergently intubated  Encephalopathic prior to intubation  OGT  Not following commands  On levophed gtt  Propofol gtt  SCDS  Needs head CT  Iv albumin  Critically ill    [DC plan]  Prior to admission,pt lived alone in her apartment  Pt goes to [facility] T/Th/Sat and takes [employer] bus to and from HD  Pt also has [facility] services  Home DME:CPaP or BiPaP and wheelchair  Discharge plan communicated with patient and/or discharge caregiver: yes on IMCU prior to transfer to ICU    Date 1st IMM letter given: 6/12/25  Outpatient follow-up.Dr Ira Banuelos  Transport at discharge:  contingent upon pt.'s needs @ time of discharge        Mayela Medina RN

## 2025-06-16 NOTE — PROGRESS NOTES
Spiritual Health History and Assessment/Progress Note  [facility]    Follow-up, Code Blue,  ,      Name: Malini Griggs MRN: [id number]    Age: 79 y.o.     Sex: female   Language: English   Rastafarian: Muslim   Altered mental status     Date: 6/16/2025            Total Time Calculated: 30 min              Spiritual Assessment continued in [facility] A4 INTENSIVE CARE UNIT        Referral/Consult From: Other    Encounter Overview/Reason: Follow-up  Service Provided For: Family    Sydnie, Belief, Meaning:   Patient identifies as spiritual, is connected with a sydnie tradition or spiritual practice, and has beliefs or practices that help with coping during difficult times  Family/Friends identify as spiritual      Importance and Influence:  Patient has spiritual/personal beliefs that influence decisions regarding their health  Family/Friends have no beliefs influential to healthcare decision-making identified during this visit    Community:  Patient feels well-supported. Support system includes: Children and Extended family  Family/Friends feel well-supported. Support system includes: Extended family    Assessment and Plan of Care:   Follow up spiritual care assessment for Pt and family now present.  met Pt's son, Hieu, who shared life review about family. That Pt., Ms. Griggs does hold a Bahai sydnie in the Muslim tradition but doesn't think she is active in any Pentecostalism at this time due to health. Hieu shared that he had an older brother that passed away and has 3 sisters. Hieu also holds a Bahai sydnie but also seeking other spiritualities, but desires to get back a part of a Muslim Pentecostalism that fits him. He expressed thankfulness for the care and support available for him and family and knows to have nurse call Chaplains for any support or questions. He is sad that his mother is going through this, and said he appreciated  keeping them in prayer.       Patient Interventions

## 2025-06-16 NOTE — PROGRESS NOTES
Bedside and Verbal shift change report given to Sophie RN (oncoming nurse) by Beto RN (offgoing nurse). Report included the following information Nurse Handoff Report, Intake/Output, MAR, Recent Results, Cardiac Rhythm NSR, Quality Measures, and Neuro Assessment.     Vitals:   06/16/25 0312  BP: (!) 115/54  Pulse: 67  Resp: 18  Temp: 97.3 °F (36.3 °C)  SpO2: 96% 2L NC

## 2025-06-16 NOTE — PROGRESS NOTES
Physical and Occupational Therapy    Attempted to see patient this morning but initially had Code Miles called due to repeatedly trying to get OOB.  Code Blue called shortly thereafter due to unresponsiveness.  Patient now transferred to ICU with further workup ongoing.  Will defer therapy at this time.    Yue Wiseman, MS, PT

## 2025-06-16 NOTE — PROCEDURES
PROCEDURE NOTE  Date: 6/16/2025   Name: Mailni Griggs  YOB: 1945    CENTRAL LINE    Date/Time: 6/16/2025 5:46 PM    Performed by: Kian Pascal MD  Authorized by: Kian Pascal MD  Consent: The procedure was performed in an emergent situation.  Risks and benefits: risks, benefits and alternatives were discussed  Indications: vascular access    Anesthesia:  Local Anesthetic: lidocaine 1% without epinephrine  Preparation: skin prepped with 2% chlorhexidine  Skin prep agent dried: skin prep agent completely dried prior to procedure  Hand hygiene: hand hygiene performed prior to central venous catheter insertion  Location details: left internal jugular  Patient position: Trendelenburg  Catheter type: triple lumen  Ultrasound guidance: yes  Sterile ultrasound techniques: sterile gel and sterile probe covers were used  Number of attempts: 1  Successful placement: yes  Assessment: blood return through all ports  Patient tolerance: patient tolerated the procedure well with no immediate complications  Comments: 7 Bulgarian, 20 cm triple-lumen catheter placed in left IJ.  Patient tolerated procedure well.

## 2025-06-16 NOTE — PROGRESS NOTES
Rapid Response called at 0910    Responded to rapid response at 0911 for AMS    Provider at bedside: no  Interventions ordered: yes  Blood sugar: 130  Sepsis suspected: known infection  Transfer to higher level of care: yes    Call initiated as a code NAVIN @ 0910 for AMS. RRT RNs to bedside. Pt was not redirectable, thrashing in bed and screaming. MD was notified. RRT Miguelito contacted Dr. Dumont @ 0913 requesting pharmacological intervention for delirium. Dr. Dumont came to bedside @ 0921 to assess. At this time, patient had become lethargic, unresponsive to voice, intermittently responsive to pain, and exhibited agonal breathing. A code BLUE at 0926. See code narrator for timeline of events. RRT, primary nurse, provider, and RT transported patient to 471. Primary IMCU RN gave report to ICU RN    Rapid ended at 1010    RRT RN assisted with transport to accepting unit: Yes

## 2025-06-16 NOTE — PROGRESS NOTES
Spiritual Health History and Assessment/Progress Note  [facility]    Attempted Encounter, Code Leonidas,  ,      Name: Malini Griggs MRN: [id number]    Age: 79 y.o.     Sex: female   Language: English   Uatsdin: Restorationism   Altered mental status     Date: 6/16/2025            Total Time Calculated: 19 min              Spiritual Assessment began in [facility] A4 INTENSIVE CARE UNIT        Referral/Consult From: Other    Encounter Overview/Reason: Attempted Encounter  Service Provided For: Patient    Sydnie, Belief, Meaning:   Patient unable to assess at this time  Family/Friends No family/friends present      Importance and Influence:  Patient unable to assess at this time  Family/Friends No family/friends present    Community:  Patient Other: Unable to assess  Family/Friends No family/friends present    Assessment and Plan of Care:     Patient Interventions include: Other: Unable to assess  Family/Friends Interventions include: No family/friends present    Patient Plan of Care: Spiritual Care available upon further referral  Family/Friends Plan of Care: Spiritual Care available upon further referral    Came to patient bedside post code blue to provide spiritual support for Ms. Malini Griggs and family. No family present at this time. Provided prayer and presence to Malini. No movement or change in patient physically during visit.     Manisha Escalona M.Div.   Pager: [phone]  on 6/16/2025 at 11:30 AM

## 2025-06-17 ENCOUNTER — APPOINTMENT (OUTPATIENT)
Facility: HOSPITAL | Age: 80
DRG: 314 | End: 2025-06-17
Payer: MEDICARE

## 2025-06-17 PROBLEM — I46.9 CARDIAC ARREST (HCC): Status: ACTIVE | Noted: 2025-06-17

## 2025-06-17 PROBLEM — Z51.5 PALLIATIVE CARE ENCOUNTER: Status: ACTIVE | Noted: 2025-06-17

## 2025-06-17 LAB
ANION GAP SERPL CALC-SCNC: 17 MMOL/L (ref 2–12)
ARTERIAL PATENCY WRIST A: ABNORMAL
BASE DEFICIT BLD-SCNC: 2.3 MMOL/L
BASOPHILS # BLD: 0 K/UL (ref 0–0.1)
BASOPHILS NFR BLD: 0 % (ref 0–1)
BDY SITE: ABNORMAL
BUN SERPL-MCNC: 50 MG/DL (ref 6–20)
BUN/CREAT SERPL: 6 (ref 12–20)
CALCIUM SERPL-MCNC: 9.8 MG/DL (ref 8.5–10.1)
CHLORIDE SERPL-SCNC: 94 MMOL/L (ref 97–108)
CO2 SERPL-SCNC: 22 MMOL/L (ref 21–32)
CREAT SERPL-MCNC: 7.98 MG/DL (ref 0.55–1.02)
DIFFERENTIAL METHOD BLD: ABNORMAL
EKG ATRIAL RATE: 73 BPM
EKG DIAGNOSIS: NORMAL
EKG P AXIS: 65 DEGREES
EKG P-R INTERVAL: 142 MS
EKG Q-T INTERVAL: 516 MS
EKG QRS DURATION: 128 MS
EKG QTC CALCULATION (BAZETT): 568 MS
EKG R AXIS: 87 DEGREES
EKG T AXIS: 37 DEGREES
EKG VENTRICULAR RATE: 73 BPM
EOSINOPHIL # BLD: 0.43 K/UL (ref 0–0.4)
EOSINOPHIL NFR BLD: 3 % (ref 0–7)
ERYTHROCYTE [DISTWIDTH] IN BLOOD BY AUTOMATED COUNT: 15.9 % (ref 11.5–14.5)
GAS FLOW.O2 O2 DELIVERY SYS: ABNORMAL
GAS FLOW.O2 SETTING OXYMISER: 16 BPM
GLUCOSE BLD STRIP.AUTO-MCNC: 113 MG/DL (ref 65–117)
GLUCOSE BLD STRIP.AUTO-MCNC: 150 MG/DL (ref 65–117)
GLUCOSE BLD STRIP.AUTO-MCNC: 163 MG/DL (ref 65–117)
GLUCOSE BLD STRIP.AUTO-MCNC: 217 MG/DL (ref 65–117)
GLUCOSE BLD STRIP.AUTO-MCNC: 228 MG/DL (ref 65–117)
GLUCOSE SERPL-MCNC: 177 MG/DL (ref 65–100)
HCO3 BLD-SCNC: 22.2 MMOL/L (ref 21–28)
HCT VFR BLD AUTO: 23.9 % (ref 35–47)
HGB BLD-MCNC: 7.6 G/DL (ref 11.5–16)
IMM GRANULOCYTES # BLD AUTO: 0 K/UL
IMM GRANULOCYTES NFR BLD AUTO: 0 %
LYMPHOCYTES # BLD: 1.3 K/UL (ref 0.8–3.5)
LYMPHOCYTES NFR BLD: 9 % (ref 12–49)
MAGNESIUM SERPL-MCNC: 2.6 MG/DL (ref 1.6–2.4)
MCH RBC QN AUTO: 28.1 PG (ref 26–34)
MCHC RBC AUTO-ENTMCNC: 31.8 G/DL (ref 30–36.5)
MCV RBC AUTO: 88.5 FL (ref 80–99)
METAMYELOCYTES NFR BLD MANUAL: 1 %
MONOCYTES # BLD: 1.15 K/UL (ref 0–1)
MONOCYTES NFR BLD: 8 % (ref 5–13)
NEUTS BAND NFR BLD MANUAL: 2 % (ref 0–6)
NEUTS SEG # BLD: 11.38 K/UL (ref 1.8–8)
NEUTS SEG NFR BLD: 77 % (ref 32–75)
NRBC # BLD: 0.54 K/UL (ref 0–0.01)
NRBC BLD-RTO: 3.8 PER 100 WBC
O2/TOTAL GAS SETTING VFR VENT: 40 %
PAW @ MEAN EXP FLOW ON VENT: 11 CMH2O
PCO2 BLD: 36.1 MMHG (ref 35–48)
PEEP RESPIRATORY: 7 CMH2O
PH BLD: 7.4 (ref 7.35–7.45)
PHOSPHATE SERPL-MCNC: 7 MG/DL (ref 2.6–4.7)
PLATELET # BLD AUTO: 377 K/UL (ref 150–400)
PMV BLD AUTO: 9.3 FL (ref 8.9–12.9)
PO2 BLD: 194 MMHG (ref 83–108)
POTASSIUM SERPL-SCNC: 4.2 MMOL/L (ref 3.5–5.1)
RBC # BLD AUTO: 2.7 M/UL (ref 3.8–5.2)
RBC MORPH BLD: ABNORMAL
SAO2 % BLD: 99.7 % (ref 92–97)
SERVICE CMNT-IMP: ABNORMAL
SERVICE CMNT-IMP: NORMAL
SODIUM SERPL-SCNC: 133 MMOL/L (ref 136–145)
SPECIMEN TYPE: ABNORMAL
TROPONIN I SERPL HS-MCNC: 336 NG/L (ref 0–51)
VENTILATION MODE VENT: ABNORMAL
VT SETTING VENT: 350 ML
WBC # BLD AUTO: 14.4 K/UL (ref 3.6–11)

## 2025-06-17 PROCEDURE — 71045 X-RAY EXAM CHEST 1 VIEW: CPT

## 2025-06-17 PROCEDURE — 6370000000 HC RX 637 (ALT 250 FOR IP): Performed by: INTERNAL MEDICINE

## 2025-06-17 PROCEDURE — 6360000002 HC RX W HCPCS: Performed by: PHYSICIAN ASSISTANT

## 2025-06-17 PROCEDURE — 2500000003 HC RX 250 WO HCPCS: Performed by: HOSPITALIST

## 2025-06-17 PROCEDURE — 94003 VENT MGMT INPAT SUBQ DAY: CPT

## 2025-06-17 PROCEDURE — 6370000000 HC RX 637 (ALT 250 FOR IP): Performed by: HOSPITALIST

## 2025-06-17 PROCEDURE — 80048 BASIC METABOLIC PNL TOTAL CA: CPT

## 2025-06-17 PROCEDURE — 94640 AIRWAY INHALATION TREATMENT: CPT

## 2025-06-17 PROCEDURE — 83735 ASSAY OF MAGNESIUM: CPT

## 2025-06-17 PROCEDURE — 99221 1ST HOSP IP/OBS SF/LOW 40: CPT | Performed by: NURSE PRACTITIONER

## 2025-06-17 PROCEDURE — 36415 COLL VENOUS BLD VENIPUNCTURE: CPT

## 2025-06-17 PROCEDURE — 93010 ELECTROCARDIOGRAM REPORT: CPT | Performed by: INTERNAL MEDICINE

## 2025-06-17 PROCEDURE — 84100 ASSAY OF PHOSPHORUS: CPT

## 2025-06-17 PROCEDURE — 6370000000 HC RX 637 (ALT 250 FOR IP): Performed by: PHYSICIAN ASSISTANT

## 2025-06-17 PROCEDURE — 94761 N-INVAS EAR/PLS OXIMETRY MLT: CPT

## 2025-06-17 PROCEDURE — 6360000002 HC RX W HCPCS: Performed by: INTERNAL MEDICINE

## 2025-06-17 PROCEDURE — 82803 BLOOD GASES ANY COMBINATION: CPT

## 2025-06-17 PROCEDURE — 2000000000 HC ICU R&B

## 2025-06-17 PROCEDURE — 90935 HEMODIALYSIS ONE EVALUATION: CPT

## 2025-06-17 PROCEDURE — 82962 GLUCOSE BLOOD TEST: CPT

## 2025-06-17 PROCEDURE — 85025 COMPLETE CBC W/AUTO DIFF WBC: CPT

## 2025-06-17 PROCEDURE — 84484 ASSAY OF TROPONIN QUANT: CPT

## 2025-06-17 PROCEDURE — 6360000002 HC RX W HCPCS: Performed by: HOSPITALIST

## 2025-06-17 PROCEDURE — 51798 US URINE CAPACITY MEASURE: CPT

## 2025-06-17 PROCEDURE — 36556 INSERT NON-TUNNEL CV CATH: CPT

## 2025-06-17 PROCEDURE — 2700000000 HC OXYGEN THERAPY PER DAY

## 2025-06-17 PROCEDURE — P9047 ALBUMIN (HUMAN), 25%, 50ML: HCPCS | Performed by: INTERNAL MEDICINE

## 2025-06-17 RX ORDER — DEXMEDETOMIDINE HYDROCHLORIDE 4 UG/ML
.1-1.5 INJECTION, SOLUTION INTRAVENOUS CONTINUOUS
Status: DISCONTINUED | OUTPATIENT
Start: 2025-06-17 | End: 2025-06-19 | Stop reason: HOSPADM

## 2025-06-17 RX ORDER — SEVELAMER CARBONATE 800 MG/1
800 TABLET, FILM COATED ORAL
Status: DISCONTINUED | OUTPATIENT
Start: 2025-06-17 | End: 2025-06-17

## 2025-06-17 RX ORDER — SEVELAMER CARBONATE 0.8 G/1
0.8 POWDER, FOR SUSPENSION ORAL
Status: DISCONTINUED | OUTPATIENT
Start: 2025-06-17 | End: 2025-06-19 | Stop reason: HOSPADM

## 2025-06-17 RX ORDER — IPRATROPIUM BROMIDE AND ALBUTEROL SULFATE 2.5; .5 MG/3ML; MG/3ML
1 SOLUTION RESPIRATORY (INHALATION) EVERY 6 HOURS PRN
Status: DISCONTINUED | OUTPATIENT
Start: 2025-06-17 | End: 2025-06-19 | Stop reason: HOSPADM

## 2025-06-17 RX ADMIN — FENTANYL CITRATE 50 MCG: 50 INJECTION INTRAMUSCULAR; INTRAVENOUS at 02:26

## 2025-06-17 RX ADMIN — SODIUM CHLORIDE, PRESERVATIVE FREE 10 ML: 5 INJECTION INTRAVENOUS at 09:07

## 2025-06-17 RX ADMIN — SEVELAMER CARBONATE 0.8 G: 800 FOR SUSPENSION ORAL at 09:03

## 2025-06-17 RX ADMIN — FENTANYL CITRATE 50 MCG: 50 INJECTION INTRAMUSCULAR; INTRAVENOUS at 06:51

## 2025-06-17 RX ADMIN — FERROUS SULFATE TAB 325 MG (65 MG ELEMENTAL FE) 325 MG: 325 (65 FE) TAB at 21:10

## 2025-06-17 RX ADMIN — SEVELAMER CARBONATE 0.8 G: 800 FOR SUSPENSION ORAL at 12:36

## 2025-06-17 RX ADMIN — SODIUM CHLORIDE, PRESERVATIVE FREE 10 ML: 5 INJECTION INTRAVENOUS at 21:10

## 2025-06-17 RX ADMIN — PROPOFOL 20 MCG/KG/MIN: 10 INJECTION, EMULSION INTRAVENOUS at 01:09

## 2025-06-17 RX ADMIN — FENTANYL CITRATE 50 MCG: 50 INJECTION INTRAMUSCULAR; INTRAVENOUS at 00:13

## 2025-06-17 RX ADMIN — DEXMEDETOMIDINE HYDROCHLORIDE 1 MCG/KG/HR: 400 INJECTION INTRAVENOUS at 17:52

## 2025-06-17 RX ADMIN — ROSUVASTATIN CALCIUM 5 MG: 10 TABLET, FILM COATED ORAL at 21:10

## 2025-06-17 RX ADMIN — ALBUMIN (HUMAN) 12.5 G: 0.25 INJECTION, SOLUTION INTRAVENOUS at 05:36

## 2025-06-17 RX ADMIN — DEXMEDETOMIDINE HYDROCHLORIDE 1 MCG/KG/HR: 400 INJECTION INTRAVENOUS at 21:34

## 2025-06-17 RX ADMIN — PROPOFOL 20 MCG/KG/MIN: 10 INJECTION, EMULSION INTRAVENOUS at 15:31

## 2025-06-17 RX ADMIN — INSULIN LISPRO 1 UNITS: 100 INJECTION, SOLUTION INTRAVENOUS; SUBCUTANEOUS at 21:10

## 2025-06-17 RX ADMIN — LEVOTHYROXINE SODIUM 75 MCG: 0.03 TABLET ORAL at 06:04

## 2025-06-17 RX ADMIN — INSULIN LISPRO 1 UNITS: 100 INJECTION, SOLUTION INTRAVENOUS; SUBCUTANEOUS at 17:29

## 2025-06-17 RX ADMIN — APIXABAN 5 MG: 5 TABLET, FILM COATED ORAL at 21:10

## 2025-06-17 RX ADMIN — DEXMEDETOMIDINE HYDROCHLORIDE 1.2 MCG/KG/HR: 400 INJECTION INTRAVENOUS at 14:43

## 2025-06-17 RX ADMIN — IPRATROPIUM BROMIDE AND ALBUTEROL SULFATE 1 DOSE: .5; 3 SOLUTION RESPIRATORY (INHALATION) at 00:38

## 2025-06-17 RX ADMIN — DEXMEDETOMIDINE HYDROCHLORIDE 0.2 MCG/KG/HR: 400 INJECTION INTRAVENOUS at 09:07

## 2025-06-17 RX ADMIN — AMIODARONE HYDROCHLORIDE 200 MG: 200 TABLET ORAL at 21:10

## 2025-06-17 RX ADMIN — EPOETIN ALFA-EPBX 10000 UNITS: 10000 INJECTION, SOLUTION INTRAVENOUS; SUBCUTANEOUS at 17:10

## 2025-06-17 RX ADMIN — FENTANYL CITRATE 50 MCG: 50 INJECTION INTRAMUSCULAR; INTRAVENOUS at 13:19

## 2025-06-17 RX ADMIN — PROPOFOL 15 MCG/KG/MIN: 10 INJECTION, EMULSION INTRAVENOUS at 06:33

## 2025-06-17 RX ADMIN — AMIODARONE HYDROCHLORIDE 200 MG: 200 TABLET ORAL at 09:03

## 2025-06-17 RX ADMIN — SEVELAMER CARBONATE 0.8 G: 800 FOR SUSPENSION ORAL at 17:10

## 2025-06-17 ASSESSMENT — PAIN SCALES - GENERAL
PAINLEVEL_OUTOF10: 0
PAINLEVEL_OUTOF10: 6
PAINLEVEL_OUTOF10: 0

## 2025-06-17 ASSESSMENT — PULMONARY FUNCTION TESTS
PIF_VALUE: 22
PIF_VALUE: 21
PIF_VALUE: 22
PIF_VALUE: 21
PIF_VALUE: 21
PIF_VALUE: 16
PIF_VALUE: 24

## 2025-06-17 NOTE — PLAN OF CARE
Problem: Safety - Adult  Goal: Free from fall injury  6/17/2025 1231 by Sierra Mendoza RN  Outcome: Progressing  6/17/2025 0054 by Jada Jurado RN  Outcome: Progressing  6/17/2025 0052 by Jada Jurado RN  Outcome: Progressing     Problem: Chronic Conditions and Co-morbidities  Goal: Patient's chronic conditions and co-morbidity symptoms are monitored and maintained or improved  6/17/2025 1231 by Sierra Mendoza RN  Outcome: Progressing  Flowsheets (Taken 6/17/2025 0819)  Care Plan - Patient's Chronic Conditions and Co-Morbidity Symptoms are Monitored and Maintained or Improved:   Monitor and assess patient's chronic conditions and comorbid symptoms for stability, deterioration, or improvement   Collaborate with multidisciplinary team to address chronic and comorbid conditions and prevent exacerbation or deterioration   Update acute care plan with appropriate goals if chronic or comorbid symptoms are exacerbated and prevent overall improvement and discharge  6/17/2025 0054 by Jada Jurado RN  Outcome: Progressing  6/17/2025 0052 by Jada Jurado RN  Outcome: Progressing  Flowsheets  Taken 6/16/2025 1500 by Maira Tsang RN  Care Plan - Patient's Chronic Conditions and Co-Morbidity Symptoms are Monitored and Maintained or Improved: Monitor and assess patient's chronic conditions and comorbid symptoms for stability, deterioration, or improvement  Taken 6/16/2025 1200 by Maira Tsang RN  Care Plan - Patient's Chronic Conditions and Co-Morbidity Symptoms are Monitored and Maintained or Improved: Monitor and assess patient's chronic conditions and comorbid symptoms for stability, deterioration, or improvement     Problem: Pain  Goal: Verbalizes/displays adequate comfort level or baseline comfort level  6/17/2025 1231 by Sierra Mendoza RN  Outcome: Progressing  Flowsheets (Taken 6/17/2025 0819)  Verbalizes/displays adequate comfort level or baseline comfort level:   Encourage patient to monitor pain  and request assistance   Assess pain using appropriate pain scale   Administer analgesics based on type and severity of pain and evaluate response   Implement non-pharmacological measures as appropriate and evaluate response   Consider cultural and social influences on pain and pain management   Notify Licensed Independent Practitioner if interventions unsuccessful or patient reports new pain  6/17/2025 0054 by Jada Jurado RN  Outcome: Progressing  6/17/2025 0052 by Jada Jurado RN  Outcome: Progressing  Flowsheets (Taken 6/16/2025 1530 by Maira Tsang, RN)  Verbalizes/displays adequate comfort level or baseline comfort level:   Encourage patient to monitor pain and request assistance   Assess pain using appropriate pain scale     Problem: Skin/Tissue Integrity  Goal: Skin integrity remains intact  Description: 1.  Monitor for areas of redness and/or skin breakdown2.  Assess vascular access sites hourly3.  Every 4-6 hours minimum:  Change oxygen saturation probe site4.  Every 4-6 hours:  If on nasal continuous positive airway pressure, respiratory therapy assess nares and determine need for appliance change or resting period  6/17/2025 1231 by Sierra Mendoza RN  Outcome: Progressing  Flowsheets (Taken 6/17/2025 0819)  Skin Integrity Remains Intact:   Monitor for areas of redness and/or skin breakdown   Assess vascular access sites hourly   Every 4-6 hours minimum:  Change oxygen saturation probe site   Assess need for specialty bed   Monitor skin under medical devices  6/17/2025 0054 by Jada Jurado RN  Outcome: Progressing  6/17/2025 0052 by Jada Jurado RN  Outcome: Progressing  Flowsheets (Taken 6/16/2025 1500 by Maira Tsang, RN)  Skin Integrity Remains Intact:   Monitor for areas of redness and/or skin breakdown   Assess vascular access sites hourly     Problem: ABCDS Injury Assessment  Goal: Absence of physical injury  6/17/2025 1231 by Sierra Mendoza RN  Outcome: Progressing  6/17/2025 0054

## 2025-06-17 NOTE — PROGRESS NOTES
Attempted to work with the patient for Physical Therapy, chart reviewed and discussed with nurse patient transferred ICU now intubated not appropriate for therapy. We will continue to follow for therapy once cleared to resume therapy.

## 2025-06-17 NOTE — PROGRESS NOTES
DANE HENSON [facility]    Malini Griggs  YOB: 1945          Assessment & Plan:     ESRD on HD TTS at [facility]  AMS  New RUE AVF with arm edema  DM2  Anemia  Mild hyperkalemia  S/p cardiac arrest 6/16 ROSC     Rec:  Patient has HD today  On low dose levophed   HD TTS  Increase FERNANDA 10K  TTS       Subjective:   CC: ESRD  Intubated and sedated  Had HD in am  Current Facility-Administered Medications   Medication Dose Route Frequency    ipratropium 0.5 mg-albuterol 2.5 mg (DUONEB) nebulizer solution 1 Dose  1 Dose Inhalation Q6H PRN    sevelamer (RENVELA) packet 0.8 g  0.8 g Orogastric TID WC    dexmedeTOMIDine (PRECEDEX) 400 mcg in sodium chloride 0.9 % 100 mL infusion  0.1-1.5 mcg/kg/hr IntraVENous Continuous    epoetin jay-epbx (RETACRIT) injection 10,000 Units  10,000 Units IntraVENous Once per day on Tuesday Thursday Saturday    propofol infusion  5-50 mcg/kg/min IntraVENous Continuous    norepinephrine (LEVOPHED) 16 mg in sodium chloride 0.9 % 250 mL infusion (premix)  1-100 mcg/min IntraVENous Continuous    amiodarone (CORDARONE) tablet 200 mg  200 mg Orogastric BID    fentaNYL (SUBLIMAZE) injection 50 mcg  50 mcg IntraVENous Q30 Min PRN    meclizine (ANTIVERT) tablet 12.5 mg  12.5 mg Oral TID PRN    ibuprofen (ADVIL;MOTRIN) tablet 400 mg  400 mg Oral Q6H PRN    OLANZapine (ZYPREXA) tablet 5 mg  5 mg Oral Nightly PRN    phenol 1.4 % mouth spray 1 spray  1 spray Mouth/Throat Q2H PRN    sodium chloride flush 0.9 % injection 5-40 mL  5-40 mL IntraVENous PRN    0.9 % sodium chloride infusion   IntraVENous PRN    sodium chloride flush 0.9 % injection 5-40 mL  5-40 mL IntraVENous PRN    0.9 % sodium chloride infusion   IntraVENous PRN    melatonin tablet 6 mg  6 mg Oral Nightly PRN    DULoxetine (CYMBALTA) extended release capsule 60 mg  60 mg Oral Daily    [Held by provider] diazePAM (VALIUM) injection 5 mg  5 mg IntraVENous Q6H PRN    albumin human 25% IV

## 2025-06-17 NOTE — CONSULTS
Comprehensive Nutrition Assessment    Type and Reason for Visit:  Reassess    Nutrition Recommendations/Plan:   Begin tube feeding:  Trickle Feeds Vital HP @ 20 mL/hour  FWF 80 mL q 3 hours  Provide 4 Prosource/day, flush with 15 mL H2O before and after     Malnutrition Assessment:  Malnutrition Status:  At risk for malnutrition (edema preccluding accurate assessment) (06/17/25 1039)    Context:  Acute Illness     Findings of the 6 clinical characteristics of malnutrition:  Energy Intake:  Unable to assess  Weight Loss:  Unable to assess     Body Fat Loss:  Unable to assess     Muscle Mass Loss:  Unable to assess    Fluid Accumulation:  Moderate to Severe Extremities, Generalized   Strength:  Not Performed    Nutrition Assessment:     6/17: Follow up. S/p code blue yesterday, intubated in ICU now. OGT in place. Currently requiring amio @ 5 mg/min, precedex @ 0.2 mcg/kg/hr, levo @ 5 mcg/min, and propofol down to ~15 mcg/kg/min. Propofol providing 256 kcal/day. Discussed with nursing and PharmD. Plan to modify bowel regimen 2/2 no BM x 4 days. Continuing phos binder. Plan to SAT/SBT later today if tolerates.    Trickle feeds at goal 20 mL/hour provide 440 kcal (+ Prosource, + Propofol, 1016 kcal, 85% needs), 48 g carbs, 36 g protein (+ 4 Prosource, 116 g, 97% needs). TF + FWF provides 1007 mL H2O/day.     Last Weight Metrics:      6/17/2025     8:24 AM 6/16/2025     3:12 AM 6/14/2025     3:14 AM 6/12/2025     1:50 PM 6/12/2025     1:30 PM 6/12/2025    10:10 AM 6/6/2025     9:33 AM   Weight Loss Metrics   Height 5' 1\"   5' 1\"   5' 1\"   Weight - Scale  238 lbs 2 oz 234 lbs 13 oz  233 lbs 11 oz 240 lbs 5 oz 230 lbs   BMI (Calculated)  45.1 kg/m2 44.5 kg/m2  44.2 kg/m2 45.5 kg/m2 43.5 kg/m2       Nutrition Related Findings:      Wound Type: None     Last BM: 06/13/25  Edema: Right upper extremity, Left upper extremity, Right lower extremity, Left lower extremity   Edema Generalized: Non-pitting  RUE Edema: +1,

## 2025-06-17 NOTE — PROGRESS NOTES
Therapy Note:  06/17/25    Chart reviewed, noted events yesterday with Code NAVIN, then Code BLUE with transfer to ICU. Pt ultimately intubated, not appropriate for intervention at this time. Will follow to resume therapy as medically appropriate.     Thank you,  Leticia Castañeda, OTR/L

## 2025-06-17 NOTE — PROGRESS NOTES
[facility] INITIAL ASSESSMENT.      Name: Malini Griggs   : 1945   MRN: [id number]   Date: 2025        Chief Complaint   Patient presents with    Altered Mental Status         HPI:     79-year-old female with history significant for hypertension, diabetes, end-stage renal disease, recent AV fistula,admitted with syncope, hypotension, concern for infected RUE fistula and course complicated by delirium and worsening encephalopathy.  Patient was having worsening delirium and received Zyprexa overnight.  In the morning, she was noted to be altered.  Later, she had a brief period of being agitated.  No further sedatives were given, however, during agitation she was noted to have progressive decline in mental status.  Later, she became fully altered with progressive bradycardia and hypotension.  There was concern for loss of falls so CODE BLUE was called.  Patient had CPR for 4 cycles receiving 3 epi, 1 bicarb.  ABG was obtained and ROSC was obtained.  Following obtaining ROSC, patient was noted to spontaneously have some muscular movements, however, she was very hypertensive.  Sedation was started.  Immediate central line access was obtained.    : stable overnight. Agitated on lightening sedation this morning. HD this morning. Improved vasopressor requirement.      Problem list:     Cardiac arrest  Suspect respiratory arrest  Hypercapnic respiratory failure  Delirium and encephalopathy  End-stage renal disease  Anemia    Assessment/Plan:     # Cardiac arrest:  - Likely respiratory arrest.  - Normothermic  - CT head negative for acute abnormalities  - Keep normotension  - Follow-up CT head  - Echo reviewed  - Start precedex and wean propofol  - Continue amiodarone  - SAT    # Encephalopathy:  - Likely related to delirium  - Likely compounded by overlapping hypercapnic respiratory failure from sedative meds.  - Hold off on sedation and assess    # Respiratory failure:  - Continue current

## 2025-06-17 NOTE — CARE COORDINATION
Care Management Progress Note    Reason for Admission:   End stage renal disease (HCC) [N18.6]  Altered mental status [R41.82]  RBBB [I45.10]  Syncope, unspecified syncope type [R55]  SIRS (systemic inflammatory response syndrome) (HCC) [R65.10]  Procedure(s) (LRB):  EXPLORATION RIGHT ARM (Right)       Patient Admission Status: Inpatient  RUR: 21%  Hospitalization in the last 30 days (Readmission):  no        Transition of care plan:  [Medical]  Ongoing medical management of ventilator and above co-morbidities  Per IDR,weaning propofol if tolerated  SBT once off propofol  On precedex gtt  No fevers  On levophed gtt  eliquis  Palliative Medicine has been consulted   [DC plan]  No plan devised @ this time due to pt being intubated  Of note:Prior to admission,pt lived alone in her apartment  Pt goes to [facility] T/Th/Sat and takes [employer] bus to and from HD  Pt also has [facility] services  Home DME:CPaP or BiPaP and wheelchair  Discharge plan communicated with patient and/or discharge caregiver: in progress    Date 1st IMM letter given: 6/12/25  Outpatient follow-up.Dr Ira Banuelos  Transport at discharge:  to be determinred  Mayela Medina RN

## 2025-06-17 NOTE — FLOWSHEET NOTE
Each dialysis catheter limb disinfected per p&p, blood returned per p&p, each dialysis hub disinfected per p&p, post dialysis catheter dwell instilled per order, and caps applied.  Primary RN SBAR:   Jada Jurado RN  Incapacitated Nurse geovanna. provided: yes  Patient Education provided: na  Preferred Education method and Primary language: english, verbal  Dialysis consent: yes  Hospital General Consent Verified: yes  Hospital associated wait time; reason: noen  Hepatitis B Surface Ag   Date/Time Value Ref Range Status   06/12/2025 11:57 AM <0.10 Index Final     Hep B S Ag Interp   Date/Time Value Ref Range Status   06/12/2025 11:57 AM Negative NEG   Final     Hep B S Ab   Date/Time Value Ref Range Status   06/12/2025 11:57 AM >1000.00 mIU/mL Final     Hep B S Ab Interp   Date/Time Value Ref Range Status   06/12/2025 11:57 AM REACTIVE (A) NR   Final        06/17/25 0430   Treatment   Treatment Goal 3000   Observations & Evaluations   Level of Consciousness 2   Oriented X 0   Heart Rhythm Regular   Respiratory Quality/Effort Unlabored   O2 Device Ventilator   Bilateral Breath Sounds Diminished   Skin Condition/Temp Warm   Abdomen Inspection Soft;Obese;Rounded   Bowel Sounds (All Quadrants) Active   Edema Right upper extremity;Left upper extremity;Right lower extremity;Left lower extremity   Edema Generalized Non-pitting   RUE Edema +1;Non-pitting   LUE Edema +1;Non-pitting   RLE Edema +1;Non-pitting   LLE Edema +1;Non-pitting   Vital Signs   BP (!) 133/47   Temp 97.9 °F (36.6 °C)   Pulse 56   Respirations 16   SpO2 100 %   Pain Assessment   Pain Assessment Critical Care Pain Observation Tool (CPOT)   Pain Level 0   Hemodialysis Central Access - Permanent/Tunneled Right Subclavian   Placement Date/Time: 06/06/25 0826   Present on Admission/Arrival: Yes  Orientation: Right  Access Location: Subclavian   $Tunneled Hemodialysis Catheter $Yes   Continued need for line? Yes   Site Assessment Clean, dry & intact   Blue Lumen  General

## 2025-06-17 NOTE — PLAN OF CARE
Problem: Safety - Adult  Goal: Free from fall injury  6/17/2025 0054 by Jada Jurado RN  Outcome: Progressing  6/17/2025 0052 by Jada Jurado RN  Outcome: Progressing  6/16/2025 1222 by Maira Tsang RN  Outcome: Progressing     Problem: ABCDS Injury Assessment  Goal: Absence of physical injury  6/17/2025 0054 by Jada Jurado RN  Outcome: Progressing  6/17/2025 0052 by Jada Jurado RN  Outcome: Progressing  6/16/2025 1222 by Maira Tsang RN  Outcome: Progressing     Problem: Safety - Medical Restraint  Goal: Remains free of injury from restraints (Restraint for Interference with Medical Device)  Description: INTERVENTIONS:  1. Determine that other, less restrictive measures have been tried or would not be effective before applying the restraint  2. Evaluate the patient's condition at the time of restraint application  3. Inform patient/family regarding the reason for restraint  4. Q2H: Monitor safety, psychosocial status, comfort, nutrition and hydration  6/17/2025 0054 by Jada Jurado RN  Outcome: Progressing  6/17/2025 0052 by Jada Jurado RN  Outcome: Progressing  Flowsheets  Taken 6/16/2025 1800 by Maira Tsang RN  Remains free of injury from restraints (restraint for interference with medical device): Determine that other, less restrictive measures have been tried or would not be effective before applying the restraint  Taken 6/16/2025 1600 by Maira Tsang RN  Remains free of injury from restraints (restraint for interference with medical device): Determine that other, less restrictive measures have been tried or would not be effective before applying the restraint  Taken 6/16/2025 1400 by Maira Tsang RN  Remains free of injury from restraints (restraint for interference with medical device): Determine that other, less restrictive measures have been tried or would not be effective before applying the restraint  6/16/2025 1222 by Maira Tsang RN  Outcome:

## 2025-06-18 ENCOUNTER — APPOINTMENT (OUTPATIENT)
Facility: HOSPITAL | Age: 80
DRG: 314 | End: 2025-06-18
Payer: MEDICARE

## 2025-06-18 PROBLEM — Z71.89 DNR (DO NOT RESUSCITATE) DISCUSSION: Status: ACTIVE | Noted: 2025-06-18

## 2025-06-18 LAB
ANION GAP SERPL CALC-SCNC: 12 MMOL/L (ref 2–12)
ARTERIAL PATENCY WRIST A: POSITIVE
BASE EXCESS BLD CALC-SCNC: 4.1 MMOL/L
BASOPHILS # BLD: 0 K/UL (ref 0–0.1)
BASOPHILS NFR BLD: 0 % (ref 0–1)
BDY SITE: ABNORMAL
BUN SERPL-MCNC: 39 MG/DL (ref 6–20)
BUN/CREAT SERPL: 8 (ref 12–20)
CALCIUM SERPL-MCNC: 9.2 MG/DL (ref 8.5–10.1)
CHLORIDE SERPL-SCNC: 95 MMOL/L (ref 97–108)
CO2 SERPL-SCNC: 26 MMOL/L (ref 21–32)
CREAT SERPL-MCNC: 5.01 MG/DL (ref 0.55–1.02)
DIFFERENTIAL METHOD BLD: ABNORMAL
EOSINOPHIL # BLD: 0.82 K/UL (ref 0–0.4)
EOSINOPHIL NFR BLD: 8 % (ref 0–7)
ERYTHROCYTE [DISTWIDTH] IN BLOOD BY AUTOMATED COUNT: 16.1 % (ref 11.5–14.5)
GAS FLOW.O2 O2 DELIVERY SYS: ABNORMAL
GAS FLOW.O2 SETTING OXYMISER: 16 BPM
GLUCOSE BLD STRIP.AUTO-MCNC: 252 MG/DL (ref 65–117)
GLUCOSE BLD STRIP.AUTO-MCNC: 276 MG/DL (ref 65–117)
GLUCOSE BLD STRIP.AUTO-MCNC: 283 MG/DL (ref 65–117)
GLUCOSE BLD STRIP.AUTO-MCNC: 296 MG/DL (ref 65–117)
GLUCOSE SERPL-MCNC: 245 MG/DL (ref 65–100)
HCO3 BLD-SCNC: 27.7 MMOL/L (ref 21–28)
HCT VFR BLD AUTO: 25 % (ref 35–47)
HGB BLD-MCNC: 7.8 G/DL (ref 11.5–16)
IMM GRANULOCYTES # BLD AUTO: 0 K/UL
IMM GRANULOCYTES NFR BLD AUTO: 0 %
LYMPHOCYTES # BLD: 1.02 K/UL (ref 0.8–3.5)
LYMPHOCYTES NFR BLD: 10 % (ref 12–49)
MAGNESIUM SERPL-MCNC: 2.2 MG/DL (ref 1.6–2.4)
MCH RBC QN AUTO: 28.2 PG (ref 26–34)
MCHC RBC AUTO-ENTMCNC: 31.2 G/DL (ref 30–36.5)
MCV RBC AUTO: 90.3 FL (ref 80–99)
METAMYELOCYTES NFR BLD MANUAL: 1 %
MONOCYTES # BLD: 0.92 K/UL (ref 0–1)
MONOCYTES NFR BLD: 9 % (ref 5–13)
MYELOCYTES NFR BLD MANUAL: 1 %
NEUTS SEG # BLD: 7.24 K/UL (ref 1.8–8)
NEUTS SEG NFR BLD: 71 % (ref 32–75)
NRBC # BLD: 0.3 K/UL (ref 0–0.01)
NRBC BLD-RTO: 2.9 PER 100 WBC
O2/TOTAL GAS SETTING VFR VENT: 30 %
PCO2 BLD: 36.5 MMHG (ref 35–48)
PEEP RESPIRATORY: 8 CMH2O
PH BLD: 7.49 (ref 7.35–7.45)
PHOSPHATE SERPL-MCNC: 4.2 MG/DL (ref 2.6–4.7)
PLATELET # BLD AUTO: 308 K/UL (ref 150–400)
PMV BLD AUTO: 9.2 FL (ref 8.9–12.9)
PO2 BLD: 114 MMHG (ref 83–108)
POTASSIUM SERPL-SCNC: 3.5 MMOL/L (ref 3.5–5.1)
RBC # BLD AUTO: 2.77 M/UL (ref 3.8–5.2)
RBC MORPH BLD: ABNORMAL
SAO2 % BLD: 98.8 % (ref 92–97)
SERVICE CMNT-IMP: ABNORMAL
SODIUM SERPL-SCNC: 133 MMOL/L (ref 136–145)
SPECIMEN TYPE: ABNORMAL
VENTILATION MODE VENT: ABNORMAL
VT SETTING VENT: 350 ML
WBC # BLD AUTO: 10.2 K/UL (ref 3.6–11)

## 2025-06-18 PROCEDURE — 71045 X-RAY EXAM CHEST 1 VIEW: CPT

## 2025-06-18 PROCEDURE — 2500000003 HC RX 250 WO HCPCS: Performed by: HOSPITALIST

## 2025-06-18 PROCEDURE — 82803 BLOOD GASES ANY COMBINATION: CPT

## 2025-06-18 PROCEDURE — 6370000000 HC RX 637 (ALT 250 FOR IP): Performed by: STUDENT IN AN ORGANIZED HEALTH CARE EDUCATION/TRAINING PROGRAM

## 2025-06-18 PROCEDURE — 6370000000 HC RX 637 (ALT 250 FOR IP): Performed by: HOSPITALIST

## 2025-06-18 PROCEDURE — 82962 GLUCOSE BLOOD TEST: CPT

## 2025-06-18 PROCEDURE — 36415 COLL VENOUS BLD VENIPUNCTURE: CPT

## 2025-06-18 PROCEDURE — 6360000002 HC RX W HCPCS: Performed by: HOSPITALIST

## 2025-06-18 PROCEDURE — 84100 ASSAY OF PHOSPHORUS: CPT

## 2025-06-18 PROCEDURE — 99232 SBSQ HOSP IP/OBS MODERATE 35: CPT | Performed by: NURSE PRACTITIONER

## 2025-06-18 PROCEDURE — 2000000000 HC ICU R&B

## 2025-06-18 PROCEDURE — 2700000000 HC OXYGEN THERAPY PER DAY

## 2025-06-18 PROCEDURE — 83735 ASSAY OF MAGNESIUM: CPT

## 2025-06-18 PROCEDURE — 6370000000 HC RX 637 (ALT 250 FOR IP): Performed by: INTERNAL MEDICINE

## 2025-06-18 PROCEDURE — 36600 WITHDRAWAL OF ARTERIAL BLOOD: CPT

## 2025-06-18 PROCEDURE — 85025 COMPLETE CBC W/AUTO DIFF WBC: CPT

## 2025-06-18 PROCEDURE — 94761 N-INVAS EAR/PLS OXIMETRY MLT: CPT

## 2025-06-18 PROCEDURE — 94003 VENT MGMT INPAT SUBQ DAY: CPT

## 2025-06-18 PROCEDURE — 80048 BASIC METABOLIC PNL TOTAL CA: CPT

## 2025-06-18 RX ORDER — AMIODARONE HYDROCHLORIDE 200 MG/1
200 TABLET ORAL DAILY
Status: DISCONTINUED | OUTPATIENT
Start: 2025-06-24 | End: 2025-06-19 | Stop reason: HOSPADM

## 2025-06-18 RX ORDER — INSULIN LISPRO 100 [IU]/ML
0-8 INJECTION, SOLUTION INTRAVENOUS; SUBCUTANEOUS EVERY 6 HOURS
Status: DISCONTINUED | OUTPATIENT
Start: 2025-06-18 | End: 2025-06-19 | Stop reason: HOSPADM

## 2025-06-18 RX ORDER — INSULIN GLARGINE 100 [IU]/ML
10 INJECTION, SOLUTION SUBCUTANEOUS DAILY
Status: DISCONTINUED | OUTPATIENT
Start: 2025-06-18 | End: 2025-06-19

## 2025-06-18 RX ADMIN — SEVELAMER CARBONATE 0.8 G: 800 FOR SUSPENSION ORAL at 11:21

## 2025-06-18 RX ADMIN — INSULIN LISPRO 4 UNITS: 100 INJECTION, SOLUTION INTRAVENOUS; SUBCUTANEOUS at 16:21

## 2025-06-18 RX ADMIN — DEXMEDETOMIDINE HYDROCHLORIDE 1.5 MCG/KG/HR: 400 INJECTION INTRAVENOUS at 15:02

## 2025-06-18 RX ADMIN — DEXMEDETOMIDINE HYDROCHLORIDE 1 MCG/KG/HR: 400 INJECTION INTRAVENOUS at 01:26

## 2025-06-18 RX ADMIN — DEXMEDETOMIDINE HYDROCHLORIDE 1.5 MCG/KG/HR: 400 INJECTION INTRAVENOUS at 10:51

## 2025-06-18 RX ADMIN — LEVOTHYROXINE SODIUM 75 MCG: 0.03 TABLET ORAL at 06:32

## 2025-06-18 RX ADMIN — DEXMEDETOMIDINE HYDROCHLORIDE 1.3 MCG/KG/HR: 400 INJECTION INTRAVENOUS at 19:38

## 2025-06-18 RX ADMIN — PROPOFOL 15 MCG/KG/MIN: 10 INJECTION, EMULSION INTRAVENOUS at 16:20

## 2025-06-18 RX ADMIN — ROSUVASTATIN CALCIUM 5 MG: 10 TABLET, FILM COATED ORAL at 21:17

## 2025-06-18 RX ADMIN — INSULIN LISPRO 4 UNITS: 100 INJECTION, SOLUTION INTRAVENOUS; SUBCUTANEOUS at 21:17

## 2025-06-18 RX ADMIN — DEXMEDETOMIDINE HYDROCHLORIDE 0.7 MCG/KG/HR: 400 INJECTION INTRAVENOUS at 23:30

## 2025-06-18 RX ADMIN — SEVELAMER CARBONATE 0.8 G: 800 FOR SUSPENSION ORAL at 16:21

## 2025-06-18 RX ADMIN — AMIODARONE HYDROCHLORIDE 200 MG: 200 TABLET ORAL at 07:40

## 2025-06-18 RX ADMIN — DEXMEDETOMIDINE HYDROCHLORIDE 1.5 MCG/KG/HR: 400 INJECTION INTRAVENOUS at 17:19

## 2025-06-18 RX ADMIN — ACETAMINOPHEN 650 MG: 325 TABLET ORAL at 11:09

## 2025-06-18 RX ADMIN — POTASSIUM BICARBONATE 20 MEQ: 782 TABLET, EFFERVESCENT ORAL at 10:27

## 2025-06-18 RX ADMIN — INSULIN LISPRO 2 UNITS: 100 INJECTION, SOLUTION INTRAVENOUS; SUBCUTANEOUS at 06:32

## 2025-06-18 RX ADMIN — DEXMEDETOMIDINE HYDROCHLORIDE 1 MCG/KG/HR: 400 INJECTION INTRAVENOUS at 05:33

## 2025-06-18 RX ADMIN — FERROUS SULFATE TAB 325 MG (65 MG ELEMENTAL FE) 325 MG: 325 (65 FE) TAB at 21:17

## 2025-06-18 RX ADMIN — SODIUM CHLORIDE, PRESERVATIVE FREE 10 ML: 5 INJECTION INTRAVENOUS at 07:40

## 2025-06-18 RX ADMIN — AMIODARONE HYDROCHLORIDE 200 MG: 200 TABLET ORAL at 21:17

## 2025-06-18 RX ADMIN — APIXABAN 5 MG: 5 TABLET, FILM COATED ORAL at 07:40

## 2025-06-18 RX ADMIN — INSULIN GLARGINE 10 UNITS: 100 INJECTION, SOLUTION SUBCUTANEOUS at 10:26

## 2025-06-18 RX ADMIN — SODIUM CHLORIDE, PRESERVATIVE FREE 10 ML: 5 INJECTION INTRAVENOUS at 21:18

## 2025-06-18 RX ADMIN — NOREPINEPHRINE BITARTRATE 2 MCG/MIN: 64 SOLUTION INTRAVENOUS at 08:11

## 2025-06-18 RX ADMIN — SEVELAMER CARBONATE 0.8 G: 800 FOR SUSPENSION ORAL at 07:40

## 2025-06-18 RX ADMIN — DULOXETINE 60 MG: 30 CAPSULE, DELAYED RELEASE ORAL at 07:40

## 2025-06-18 RX ADMIN — INSULIN LISPRO 4 UNITS: 100 INJECTION, SOLUTION INTRAVENOUS; SUBCUTANEOUS at 10:23

## 2025-06-18 RX ADMIN — PROPOFOL 13 MCG/KG/MIN: 10 INJECTION, EMULSION INTRAVENOUS at 00:12

## 2025-06-18 RX ADMIN — APIXABAN 5 MG: 5 TABLET, FILM COATED ORAL at 21:17

## 2025-06-18 RX ADMIN — DEXMEDETOMIDINE HYDROCHLORIDE 0.8 MCG/KG/HR: 400 INJECTION INTRAVENOUS at 08:11

## 2025-06-18 ASSESSMENT — PULMONARY FUNCTION TESTS
PIF_VALUE: 26
PIF_VALUE: 18
PIF_VALUE: 31
PIF_VALUE: 37
PIF_VALUE: 22
PIF_VALUE: 24

## 2025-06-18 NOTE — CONSULTS
Palliative Medicine  Patient Name: Malini Griggs  YOB: 1945  MRN: [id number]  Age: 79 y.o.  Gender: female    Date of Initial Consult: 6/17/2025  Date of Service: 6/17/2025  Time: 10:20 PM  Provider: DANTE Sims NP  Hospital Day: 13  Admit Date: 6/5/2025  Referring Provider: Dr. Pascal       Reasons for Consultation:  Goals of Care    HISTORY OF PRESENT ILLNESS (HPI):   Malini Griggs is a 79 y.o. female with a past medical history of ESRD on HD, seizures?, KATHRYN w/ home CPAP, CHF, DM2, who presented from D[facility] Center after she hade acute change in mental status, w/ decreased responsiveness and was admitted on 6/5/2025 with a diagnosis of AMS, Right BBB, Pulmonary edema and RUE redness and edema.     Course of hospitalization:    6/6- ECHO EF 60%, Mod AS, mod-severe MS.    6/7- Dx SIRS. Febrile, RUE fistula site purulent, erythematous, edematous, very painful to touch. Started empiric abx, hypotensive w/ HD.    6/8- ID consulted.  6/10- Worsening AMS, agitation, requiring sitter.    6/11- Persistent agitation, combative, Cefepime d/c due to concern it may be cause of AMS     6/12- AV fistula Cx without growth.    6/13-EEG w/ severe generalized slowing, possible seizure,  Episode of Afib.    6/15- Mentation improving, noted to be oriented x3, but w/ poor insight.     6/16- Code NAVIN called , agitated, thrashing in bed, unable to be redirected, then became unresponsive w/ agonal breathing, Code Blue Called, s/p x 4 cycles CPR, ROSC achieved. patient intubated, started on low dose of levophed    Psychosocial: , several adult children.      PALLIATIVE DIAGNOSES:    Palliative care encounter  AMS, unspecified  DNR discussion  GOC discussion    ASSESSMENT AND PLAN:   Palliative consulted to assist w/ GOC discussions with Ms. Malini Griggs and family.   Prior to visit completed extensive chart review and discussed w/ patients nurse and attending Dr. Pascal.    Ms. Griggs was seen and

## 2025-06-18 NOTE — PROGRESS NOTES
[facility] INITIAL ASSESSMENT.      Name: Malini Griggs   : 1945   MRN: [id number]   Date: 2025        Chief Complaint   Patient presents with    Altered Mental Status         HPI:     79-year-old female with history significant for hypertension, diabetes, end-stage renal disease, recent AV fistula,admitted with syncope, hypotension, concern for infected RUE fistula and course complicated by delirium and worsening encephalopathy.  Patient was having worsening delirium and received Zyprexa overnight.  In the morning, she was noted to be altered.  Later, she had a brief period of being agitated.  No further sedatives were given, however, during agitation she was noted to have progressive decline in mental status.  Later, she became fully altered with progressive bradycardia and hypotension.  There was concern for loss of falls so CODE BLUE was called.  Patient had CPR for 4 cycles receiving 3 epi, 1 bicarb.  ABG was obtained and ROSC was obtained.  Following obtaining ROSC, patient was noted to spontaneously have some muscular movements, however, she was very hypertensive.  Sedation was started.  Immediate central line access was obtained.    : stable overnight. Agitated on lightening sedation this morning. HD this morning. Improved vasopressor requirement.    : Not having purposeful awakening. Agitated but not able to participate in evaluation.      Problem list:     Cardiac arrest  Suspect respiratory arrest  Hypercapnic respiratory failure  Delirium and encephalopathy  End-stage renal disease  Anemia    Assessment/Plan:     # Cardiac arrest:  - respiratory arrest.  - CT head negative for acute abnormalities  - Keep normotension  - Follow-up CT head  - Echo reviewed  - Continue precedex and wean propofol  - Continue amiodarone  - SAT    # Encephalopathy:  - Likely related to delirium  - Likely compounded by overlapping hypercapnic respiratory failure from sedative meds.  -

## 2025-06-18 NOTE — PROGRESS NOTES
Occupational Therapy:  06/18/25    Chart reviewed, spoke to RN. Pt remains intubated and sedated. RN reports patient not actively participating in care, no plans for extubation at this time. In agreement to complete therapy orders at this time. Please re-consult once pt is stable and able to participate.     Thank you,  Leticia Castañeda, OTR/L

## 2025-06-18 NOTE — PROGRESS NOTES
Patient received from Jovita BARGER. 's-150's. Afib RVR. All other VS WNL. RRT called for elevated HR. Patient has no IV access at this time.     Per off going nurse IV access attempted over night and unsuccessful. Mobile access ordered and consulted. MD notified.     New orders to give PO coreg. Requested to upgrade level of care at this time. Denied at this time. Will continue to monitor for changes

## 2025-06-18 NOTE — PROGRESS NOTES
Physical Therapy Note:    Patient remains intubated and sedated in ICU. Discussed with RN who reports patient is not actively participating in care, no plans for extubation at this time, RN recommends completing therapy order at this time and will initiate re-order when appropriate.    Thank you,  Martha Snyder, PT, DPT

## 2025-06-18 NOTE — PROGRESS NOTES
Brief ICU Nutrition Assessment    Type and Reason for Visit: Reassess    Nutrition Recommendations/Plan:   Brief follow up. Remaining intubated today per IDR discussion. Tolerating trophic feeds thus far, but BG elevated. Switching to medium dose sliding scale insulin and adding 10 u/day lantus. Failed SBT yesterday. No BM yesterday and no bowel regimen started - planning to provide miralax today. Currently requiring precedex @ 1.5 mcg/kg/hr, levo @ 2 mcg/min, and is off of propofol. Repleting K.     Vital HP @ end goal 35 mL/hour  Advance by 5 mL q 6 hours until goal reached  FWF 45 mL q 3 hours  Provide 3 Prosource/day, flush with 15 mL H2O before and after    Tube feeds at goal 35 mL/hour provide 770 kcal (+ Prosource, 1010 kcal, 85% needs), 85 g carbs, 67 g protein (+ 3 Prosource, 127 g, 100% needs). TF + FWF provides 1003 mL H2O/day.     Last BM: 06/13/25  Edema: Right upper extremity, Left upper extremity, Right lower extremity, Left lower extremity   Edema Generalized: Non-pitting  RUE Edema: +1, Non-pitting  LUE Edema: Non-pitting  RLE Edema: +1  LLE Edema: Non-pitting    Nutr. Labs:    Lab Results   Component Value Date    CREATININE 5.01 (H) 06/18/2025    BUN 39 (H) 06/18/2025     (L) 06/18/2025    K 3.5 06/18/2025    CL 95 (L) 06/18/2025    CO2 26 06/18/2025       Lab Results   Component Value Date/Time    POCGLU 296 06/18/2025 10:14 AM    POCGLU 276 06/18/2025 06:29 AM    POCGLU 217 06/17/2025 08:48 PM    POCGLU 228 06/17/2025 05:24 PM    POCGLU 163 06/17/2025 12:15 PM    POCGLU 113 06/17/2025 07:44 AM        Hemoglobin A1C   Date Value Ref Range Status   06/07/2025 5.9 (H) 4.0 - 5.6 % Final     Comment:     (NOTE)  HbA1C Interpretive Ranges  <5.7              Normal  5.7 - 6.4         Consider Prediabetes  >6.5              Consider Diabetes         Lab Results   Component Value Date/Time    MG 2.2 06/18/2025 04:22 AM       Lab Results   Component Value Date    CALCIUM 9.2 06/18/2025    PHOS 4.2

## 2025-06-18 NOTE — CARE COORDINATION
Care Management Progress Note    Reason for Admission:   End stage renal disease (HCC) [N18.6]  Altered mental status [R41.82]  RBBB [I45.10]  Syncope, unspecified syncope type [R55]  SIRS (systemic inflammatory response syndrome) (HCC) [R65.10]  Procedure(s) (LRB):  EXPLORATION RIGHT ARM (Right)       Patient Admission Status: Inpatient  RUR: 21%  Hospitalization in the last 30 days (Readmission):  no        Transition of care plan:  [Medical]  Remains intubated and sedated  No purposeful movements/interactions  In IDR it was discussed that if pt fails SBT tomorrow,family may consider  transitioning pt. Son plans to discuss goals of care tonight with his 3 sisters.  I received a call from daughter ,Vidhi ([phone] requesting a UAI.I explained to Vidhi it is premature to complete the UAI @ this time.  Son and daughter in the room plan to talk with Vidhi this evening and another daughter in [facility] regarding whether to continue restorative care . Son stated \"I am not sure we want to keep putting our mother through all of this.\"  [DC plan]  See above  Discharge plan communicated with patient and/or discharge caregiver: yes    Date 1st IMM letter given: 6/12/25  Outpatient follow-up.to be determined  Transport at discharge:  BERRY Medina RN

## 2025-06-19 ENCOUNTER — APPOINTMENT (OUTPATIENT)
Facility: HOSPITAL | Age: 80
DRG: 314 | End: 2025-06-19
Payer: MEDICARE

## 2025-06-19 VITALS
SYSTOLIC BLOOD PRESSURE: 123 MMHG | HEART RATE: 54 BPM | BODY MASS INDEX: 44.95 KG/M2 | HEIGHT: 61 IN | RESPIRATION RATE: 14 BRPM | TEMPERATURE: 97.7 F | WEIGHT: 238.1 LBS | DIASTOLIC BLOOD PRESSURE: 53 MMHG | OXYGEN SATURATION: 100 %

## 2025-06-19 PROBLEM — Z71.89 DNR (DO NOT RESUSCITATE) DISCUSSION: Status: RESOLVED | Noted: 2025-06-18 | Resolved: 2025-06-19

## 2025-06-19 PROBLEM — R55 SYNCOPE: Status: RESOLVED | Noted: 2025-06-14 | Resolved: 2025-06-19

## 2025-06-19 PROBLEM — D72.829 LEUKOCYTOSIS: Status: RESOLVED | Noted: 2025-06-11 | Resolved: 2025-06-19

## 2025-06-19 PROBLEM — I45.10 RBBB: Status: RESOLVED | Noted: 2025-06-05 | Resolved: 2025-06-19

## 2025-06-19 PROBLEM — Z71.89 GOALS OF CARE, COUNSELING/DISCUSSION: Status: ACTIVE | Noted: 2025-06-19

## 2025-06-19 PROBLEM — T82.7XXA INFECTION OF AV GRAFT FOR DIALYSIS: Status: RESOLVED | Noted: 2025-06-09 | Resolved: 2025-06-19

## 2025-06-19 PROBLEM — Z51.5 PALLIATIVE CARE ENCOUNTER: Status: RESOLVED | Noted: 2025-06-17 | Resolved: 2025-06-19

## 2025-06-19 PROBLEM — I46.9 CARDIAC ARREST (HCC): Status: RESOLVED | Noted: 2025-06-17 | Resolved: 2025-06-19

## 2025-06-19 LAB
ANION GAP SERPL CALC-SCNC: 11 MMOL/L (ref 2–12)
ARTERIAL PATENCY WRIST A: POSITIVE
BASE EXCESS BLD CALC-SCNC: 2.5 MMOL/L
BASOPHILS # BLD: 0 K/UL (ref 0–0.1)
BASOPHILS NFR BLD: 0 % (ref 0–1)
BDY SITE: ABNORMAL
BUN SERPL-MCNC: 64 MG/DL (ref 6–20)
BUN/CREAT SERPL: 10 (ref 12–20)
CALCIUM SERPL-MCNC: 9.5 MG/DL (ref 8.5–10.1)
CHLORIDE SERPL-SCNC: 94 MMOL/L (ref 97–108)
CO2 SERPL-SCNC: 27 MMOL/L (ref 21–32)
CREAT SERPL-MCNC: 6.48 MG/DL (ref 0.55–1.02)
DIFFERENTIAL METHOD BLD: ABNORMAL
EOSINOPHIL # BLD: 0.21 K/UL (ref 0–0.4)
EOSINOPHIL NFR BLD: 2 % (ref 0–7)
ERYTHROCYTE [DISTWIDTH] IN BLOOD BY AUTOMATED COUNT: 16.6 % (ref 11.5–14.5)
GAS FLOW.O2 O2 DELIVERY SYS: ABNORMAL
GAS FLOW.O2 SETTING OXYMISER: 14 BPM
GLUCOSE BLD STRIP.AUTO-MCNC: 125 MG/DL (ref 65–117)
GLUCOSE BLD STRIP.AUTO-MCNC: 264 MG/DL (ref 65–117)
GLUCOSE SERPL-MCNC: 241 MG/DL (ref 65–100)
HCO3 BLD-SCNC: 26.3 MMOL/L (ref 21–28)
HCT VFR BLD AUTO: 26.2 % (ref 35–47)
HGB BLD-MCNC: 8.1 G/DL (ref 11.5–16)
IMM GRANULOCYTES # BLD AUTO: 0 K/UL (ref 0–0.04)
IMM GRANULOCYTES NFR BLD AUTO: 0 % (ref 0–0.5)
LYMPHOCYTES # BLD: 1.17 K/UL (ref 0.8–3.5)
LYMPHOCYTES NFR BLD: 11 % (ref 12–49)
MAGNESIUM SERPL-MCNC: 2.4 MG/DL (ref 1.6–2.4)
MCH RBC QN AUTO: 27.6 PG (ref 26–34)
MCHC RBC AUTO-ENTMCNC: 30.9 G/DL (ref 30–36.5)
MCV RBC AUTO: 89.4 FL (ref 80–99)
MONOCYTES # BLD: 0.95 K/UL (ref 0–1)
MONOCYTES NFR BLD: 9 % (ref 5–13)
NEUTS BAND NFR BLD MANUAL: 1 %
NEUTS SEG # BLD: 8.27 K/UL (ref 1.8–8)
NEUTS SEG NFR BLD: 77 % (ref 32–75)
NRBC # BLD: 0.13 K/UL (ref 0–0.01)
NRBC BLD-RTO: 1.2 PER 100 WBC
O2/TOTAL GAS SETTING VFR VENT: 35 %
PCO2 BLD: 36.2 MMHG (ref 35–48)
PEEP RESPIRATORY: 8 CMH2O
PH BLD: 7.47 (ref 7.35–7.45)
PHOSPHATE SERPL-MCNC: 4.4 MG/DL (ref 2.6–4.7)
PLATELET # BLD AUTO: 344 K/UL (ref 150–400)
PMV BLD AUTO: 9 FL (ref 8.9–12.9)
PO2 BLD: 134 MMHG (ref 83–108)
POTASSIUM SERPL-SCNC: 3.8 MMOL/L (ref 3.5–5.1)
RBC # BLD AUTO: 2.93 M/UL (ref 3.8–5.2)
RBC MORPH BLD: ABNORMAL
RBC MORPH BLD: ABNORMAL
SAO2 % BLD: 99.2 % (ref 92–97)
SERVICE CMNT-IMP: ABNORMAL
SERVICE CMNT-IMP: ABNORMAL
SODIUM SERPL-SCNC: 132 MMOL/L (ref 136–145)
SPECIMEN TYPE: ABNORMAL
VENTILATION MODE VENT: ABNORMAL
VT SETTING VENT: 350 ML
WBC # BLD AUTO: 10.6 K/UL (ref 3.6–11)

## 2025-06-19 PROCEDURE — 36600 WITHDRAWAL OF ARTERIAL BLOOD: CPT

## 2025-06-19 PROCEDURE — 94003 VENT MGMT INPAT SUBQ DAY: CPT

## 2025-06-19 PROCEDURE — 6360000002 HC RX W HCPCS: Performed by: HOSPITALIST

## 2025-06-19 PROCEDURE — 71045 X-RAY EXAM CHEST 1 VIEW: CPT

## 2025-06-19 PROCEDURE — 6360000002 HC RX W HCPCS: Performed by: INTERNAL MEDICINE

## 2025-06-19 PROCEDURE — 90935 HEMODIALYSIS ONE EVALUATION: CPT

## 2025-06-19 PROCEDURE — 6370000000 HC RX 637 (ALT 250 FOR IP): Performed by: HOSPITALIST

## 2025-06-19 PROCEDURE — 84100 ASSAY OF PHOSPHORUS: CPT

## 2025-06-19 PROCEDURE — 94761 N-INVAS EAR/PLS OXIMETRY MLT: CPT

## 2025-06-19 PROCEDURE — 85025 COMPLETE CBC W/AUTO DIFF WBC: CPT

## 2025-06-19 PROCEDURE — 99233 SBSQ HOSP IP/OBS HIGH 50: CPT | Performed by: NURSE PRACTITIONER

## 2025-06-19 PROCEDURE — P9047 ALBUMIN (HUMAN), 25%, 50ML: HCPCS | Performed by: INTERNAL MEDICINE

## 2025-06-19 PROCEDURE — 2500000003 HC RX 250 WO HCPCS: Performed by: HOSPITALIST

## 2025-06-19 PROCEDURE — 36415 COLL VENOUS BLD VENIPUNCTURE: CPT

## 2025-06-19 PROCEDURE — 82803 BLOOD GASES ANY COMBINATION: CPT

## 2025-06-19 PROCEDURE — 2700000000 HC OXYGEN THERAPY PER DAY

## 2025-06-19 PROCEDURE — 82962 GLUCOSE BLOOD TEST: CPT

## 2025-06-19 PROCEDURE — 83735 ASSAY OF MAGNESIUM: CPT

## 2025-06-19 PROCEDURE — 80048 BASIC METABOLIC PNL TOTAL CA: CPT

## 2025-06-19 PROCEDURE — 6370000000 HC RX 637 (ALT 250 FOR IP): Performed by: STUDENT IN AN ORGANIZED HEALTH CARE EDUCATION/TRAINING PROGRAM

## 2025-06-19 RX ORDER — INSULIN GLARGINE 100 [IU]/ML
14 INJECTION, SOLUTION SUBCUTANEOUS DAILY
Status: DISCONTINUED | OUTPATIENT
Start: 2025-06-19 | End: 2025-06-19 | Stop reason: HOSPADM

## 2025-06-19 RX ADMIN — DULOXETINE 60 MG: 30 CAPSULE, DELAYED RELEASE ORAL at 10:28

## 2025-06-19 RX ADMIN — INSULIN LISPRO 4 UNITS: 100 INJECTION, SOLUTION INTRAVENOUS; SUBCUTANEOUS at 04:27

## 2025-06-19 RX ADMIN — DEXMEDETOMIDINE HYDROCHLORIDE 0.9 MCG/KG/HR: 400 INJECTION INTRAVENOUS at 04:12

## 2025-06-19 RX ADMIN — AMIODARONE HYDROCHLORIDE 200 MG: 200 TABLET ORAL at 10:29

## 2025-06-19 RX ADMIN — SODIUM CHLORIDE, PRESERVATIVE FREE 10 ML: 5 INJECTION INTRAVENOUS at 10:29

## 2025-06-19 RX ADMIN — LEVOTHYROXINE SODIUM 75 MCG: 0.03 TABLET ORAL at 06:24

## 2025-06-19 RX ADMIN — ALBUMIN (HUMAN) 25 G: 0.25 INJECTION, SOLUTION INTRAVENOUS at 07:41

## 2025-06-19 RX ADMIN — SEVELAMER CARBONATE 0.8 G: 800 FOR SUSPENSION ORAL at 10:28

## 2025-06-19 RX ADMIN — PANTOPRAZOLE SODIUM 40 MG: 40 INJECTION, POWDER, FOR SOLUTION INTRAVENOUS at 10:31

## 2025-06-19 RX ADMIN — APIXABAN 5 MG: 5 TABLET, FILM COATED ORAL at 10:29

## 2025-06-19 RX ADMIN — PROPOFOL 15 MCG/KG/MIN: 10 INJECTION, EMULSION INTRAVENOUS at 09:00

## 2025-06-19 RX ADMIN — PROPOFOL 25 MCG/KG/MIN: 10 INJECTION, EMULSION INTRAVENOUS at 14:28

## 2025-06-19 RX ADMIN — DEXMEDETOMIDINE HYDROCHLORIDE 1 MCG/KG/HR: 400 INJECTION INTRAVENOUS at 10:59

## 2025-06-19 RX ADMIN — DEXMEDETOMIDINE HYDROCHLORIDE 1 MCG/KG/HR: 400 INJECTION INTRAVENOUS at 14:27

## 2025-06-19 RX ADMIN — DEXMEDETOMIDINE HYDROCHLORIDE 0.9 MCG/KG/HR: 400 INJECTION INTRAVENOUS at 07:25

## 2025-06-19 RX ADMIN — PROPOFOL 15 MCG/KG/MIN: 10 INJECTION, EMULSION INTRAVENOUS at 02:14

## 2025-06-19 ASSESSMENT — PAIN SCALES - GENERAL
PAINLEVEL_OUTOF10: 0
PAINLEVEL_OUTOF10: 0

## 2025-06-19 ASSESSMENT — PULMONARY FUNCTION TESTS
PIF_VALUE: 25
PIF_VALUE: 22
PIF_VALUE: 34

## 2025-06-19 NOTE — PLAN OF CARE
Problem: Safety - Medical Restraint  Goal: Remains free of injury from restraints (Restraint for Interference with Medical Device)  Description: INTERVENTIONS:  1. Determine that other, less restrictive measures have been tried or would not be effective before applying the restraint  2. Evaluate the patient's condition at the time of restraint application  3. Inform patient/family regarding the reason for restraint  4. Q2H: Monitor safety, psychosocial status, comfort, nutrition and hydration  6/19/2025 1052 by Karli Marin, RN  Outcome: Progressing  Flowsheets (Taken 6/19/2025 1049)  Remains free of injury from restraints (restraint for interference with medical device):   Every 2 hours: Monitor safety, psychosocial status, comfort, nutrition and hydration   Evaluate the patient's condition at the time of restraint application  6/19/2025 0832 by Sage Berry, RN  Outcome: Progressing

## 2025-06-19 NOTE — FLOWSHEET NOTE
06/19/25 0645   Observations & Evaluations   Level of Consciousness 2   Heart Rhythm Regular   Respiratory Quality/Effort Unlabored   O2 Device Ventilator   Skin Condition/Temp Dry;Warm   Edema Generalized   Vital Signs   Temp 98.7 °F (37.1 °C)   Pain Assessment   Pain Assessment Critical Care Pain Observation Tool (CPOT)   Pain Level 0   Hemodialysis Central Access - Permanent/Tunneled Right Subclavian   Placement Date/Time: 06/06/25 0826   Present on Admission/Arrival: Yes  Orientation: Right  Access Location: Subclavian   Continued need for line? Yes   Site Assessment Clean, dry & intact   Blue Lumen Status Flushed   Red Lumen Status Flushed   Line Care Ports disinfected   Dressing Type Antimicrobial;Transparent   Date of Last Dressing Change 06/17/25   Dressing Status Clean, dry & intact   Technical Checks   Dialysis Machine No. 01   RO Machine Number [id number]   Dialyzer Lot No. [id number]   Tubing Lot Number [id number]   All Connections Secure Yes   NS Bag Yes   Saline Line Double Clamped Yes   Dialyzer Nipro ELISIO   Prime Volume (mL) 200 mL   ICEBOAT I;C;E;B;O;A;T   RO Machine Log Sheet Completed Yes   Machine Alarm Self Test Completed;Passed   Air Foam Detector Tested;Proper Function;pH Reading   Extracorporeal Circuit Tested for Integrity Yes   Machine Conductivity 14.1   Machine Ph 7.4   Bleach Test (Neg) Yes   Bath Temperature 98.6 °F (37 °C)   Treatment Initiation   Dialyze Hours 3.25   Treatment  Initiation Universal Precautions maintained;Lines secured to patient;Connections secured;Prime given;Venous Parameters set;Arterial Parameters set;Air foam detector engaged;Dialysate;Saline line double clamped;Dialyzer   Dialysis Bath   K+ (Potassium) 4   Ca+ (Calcium) 2.5   Na+ (Sodium) 138   HCO3 (Bicarb) 35        06/19/25 0650   Treatment   Time On 0650   Treatment Goal 3 L   During Hemodialysis Assessment   Blood Flow Rate (ml/min) 400 ml/min   Arterial Pressure (mmHg) -160 mmHg   Venous Pressure (mmHg) 140   TMP    Rinseback Volume (ml) 300 ml   Blood Volume Processed (Liters) 78 L   Dialyzer Clearance Lightly streaked   Duration of Treatment (minutes) 195 minutes   Hemodialysis Intake (ml) 500 ml   Hemodialysis Output (ml) 3500 ml   NET Removed (ml) 3000   Tolerated Treatment Good   Primary RN SBAR: Sage Berry, RN    Comments: HD tx completed

## 2025-06-19 NOTE — PROGRESS NOTES
Palliative Medicine  Patient Name: Malini Griggs  YOB: 1945  MRN: [id number]  Age: 79 y.o.  Gender: female    Date of Initial Consult: 2025  Date of Service: 2025  Time: 10:31 PM  Provider: DANTE Sims NP  Hospital Day: 14  Admit Date: 2025  Referring Provider: Dr. Pascal       Reasons for Consultation:  Goals of Care    HISTORY OF PRESENT ILLNESS (HPI):   Malini Griggs is a 79 y.o. female with a past medical history of ESRD on HD, seizures?, KATHRYN w/ home CPAP, CHF, DM2, who presented from Dialysis Center after she hade acute change in mental status, w/ decreased responsiveness and was admitted on 2025 with a diagnosis of AMS, Right BBB, Pulmonary edema and RUE redness and edema.     Course of hospitalization:    - ECHO EF 60%, Mod AS, mod-severe MS.    - Dx SIRS. Febrile, RUE fistula site purulent, erythematous, edematous, very painful to touch. Started empiric abx, hypotensive w/ HD.    - ID consulted.  6/10- Worsening AMS, agitation, requiring sitter.    - Persistent agitation, combative, Cefepime d/c due to concern it may be cause of AMS     - AV fistula Cx without growth.    -EEG w/ severe generalized slowing, possible seizure,  Episode of Afib.    6/15- Mentation improving, noted to be oriented x3, but w/ poor insight.     - Code NAVIN called , agitated, thrashing in bed, unable to be redirected, then became unresponsive w/ agonal breathing, Code Blue Called, s/p x 4 cycles CPR, ROSC achieved. patient intubated, started on low dose of levophed.      - Still intubated on vent, SBT this morning, back on precedex and propofol, small dose of levophed.     Psychosocial: Patient lives by herself. She was Born and raised in [place], moved to [place], where children grew up , 4 living children, 2 . Son lives in [place], 1 daughter in [place], another lives in [facility] and other [place]..       PALLIATIVE DIAGNOSES:   Performance Scale (PPS):       ECOG:        Modified ESAS:       Clinical Pain Assessment (nonverbal scale for severity on nonverbal patients):           NVPS:       RDOS:         Vital Signs: Blood pressure (!) 117/42, pulse 52, temperature 100.4 °F (38 °C), temperature source Oral, resp. rate 14, height 1.549 m (5' 1\"), weight 108 kg (238 lb 1.6 oz), SpO2 100%.    PHYSICAL ASSESSMENT:   General: [] Oriented x3  [] Well appearing  [x] Intubated  [x]Ill appearing  []Other:  Mental Status: [] Normal mental status exam  [] Drowsy  [] Confused  [x]Other:  Cardiovascular: [x] Regular rate/rhythm  [] Arrhythmia  [] Other:  Chest: [x] Effort normal  []Lungs clear  [] Respiratory distress  []Tachypnea  [] Other:  Abdomen: [] Soft/non-tender  [] Normal appearance  [] Distended  [] Ascites  [x] Other:obese  Neurological: [] Normal speech  [] Normal sensation  [x]Deficits present:  Extremity: [] Normal skin color/temp  [] Clubbing/cyanosis  [] No edema  [x] Other:edema    Wt Readings from Last 15 Encounters:   06/16/25 108 kg (238 lb 1.6 oz)   05/19/23 95.3 kg (210 lb)        Current Diet: ADULT TUBE FEEDING; Orogastric; Peptide Based High Protein; Continuous; 20; Yes; 5; Q 6 hours; 35; 45; Q 3 hours; Protein; 1 Dose; TID       PSYCHOSOCIAL/SPIRITUAL SCREENING:   Palliative IDT has assessed this patient for cultural preferences / practices and a referral made as appropriate to needs (Cultural Services, Patient Advocacy, Ethics, etc.)    Spiritual Affiliation: Pentecostalism    Any spiritual / Amish concerns:  [] Yes /  [x] No   If \"Yes\" to discuss with pastoral care during IDT     Does caregiver feel burdened by caring for their loved one:   [] Yes /  [] No /  [] No Caregiver Present/Available [x] No Caregiver [] Pt Lives at Facility  If \"Yes\" to discuss with social work during IDT    Anticipatory grief assessment:   [x] Normal  / [] Maladaptive     If \"Maladaptive\" to discuss with social work during IDT    ESAS Anxiety:

## 2025-06-19 NOTE — PROGRESS NOTES
release capsule Take 1 capsule by mouth daily   Yes Automatic Reconciliation, Ar   ferrous sulfate (IRON 325) 325 (65 Fe) MG tablet Take by mouth nightly   Yes Automatic Reconciliation, Ar   gabapentin (NEURONTIN) 100 MG capsule Take 1 capsule by mouth 2 times daily.   Yes Automatic Reconciliation, Ar   levothyroxine (SYNTHROID) 50 MCG tablet Take 1 tablet by mouth daily 4/6/10  Yes Automatic Reconciliation, Ar   insulin glargine (LANTUS) 100 UNIT/ML injection vial Inject 24 Units into the skin nightly    Provider, Carl, MD         Allergies/Social/Family History:     Allergies   Allergen Reactions   • Iron Anaphylaxis     LIQUID IRON   • Amoxicillin Other (See Comments)     ineffective   • Aspirin Hallucinations   • Baclofen Other (See Comments)     hallucinations   • Hydrochlorothiazide W-Spironolactone Other (See Comments)     CR rise   • Spironolactone Other (See Comments)     Cr rise   • Sulfa Antibiotics Nausea Only and Other (See Comments)     fatigue   • Sulfur Other (See Comments)     Sick to stomach   • Codeine Nausea And Vomiting      Social History     Tobacco Use   • Smoking status: Former     Current packs/day: 0.00     Types: Cigarettes     Start date:      Quit date:      Years since quittin.4   • Smokeless tobacco: Never   Substance Use Topics   • Alcohol use: Not Currently      Family History   Problem Relation Age of Onset   • No Known Problems Mother    • Diabetes Father    • Hypertension Father    • Heart Disease Father    • Lupus Daughter    • Anesth Problems Daughter         DIFFICULTY AWAKENING   • Cancer Daughter    • Pancreatic Cancer Son          LABS AND  DATA:   Reviewed          CRITICAL CARE CONSULTANT NOTE  I had a face to face encounter with the patient, reviewed and interpreted patient data including clinical events, labs, images, vital signs, I/O's, and examined patient.  I have discussed the case and the plan and management of the patient's care with the

## 2025-06-19 NOTE — PROGRESS NOTES
Palliative Medicine  Patient Name: Malini Griggs  YOB: 1945  MRN: [id number]  Age: 79 y.o.  Gender: female    Date of Initial Consult: 6/17/2025  Date of Service: 6/19/2025  Time: 11:57 AM  Provider: DANTE Sims NP  Hospital Day: 15  Admit Date: 6/5/2025  Referring Provider: Dr. Pascal       Reasons for Consultation:  Goals of Care    HISTORY OF PRESENT ILLNESS (HPI):   Malini Griggs is a 79 y.o. female with a past medical history of ESRD on HD, seizures?, KATHRYN w/ home CPAP, CHF, DM2, who presented from [facility] after she hade acute change in mental status, w/ decreased responsiveness and was admitted on 6/5/2025 with a diagnosis of AMS, Right BBB, Pulmonary edema and RUE redness and edema.     Course of hospitalization:    6/6- ECHO EF 60%, Mod AS, mod-severe MS.    6/7- Dx SIRS. Febrile, RUE fistula site purulent, erythematous, edematous, very painful to touch. Started empiric abx, hypotensive w/ HD.    6/8- ID consulted.  6/10- Worsening AMS, agitation, requiring sitter.    6/11- Persistent agitation, combative, Cefepime d/c due to concern it may be cause of AMS     6/12- AV fistula Cx without growth.    6/13-EEG w/ severe generalized slowing, possible seizure,  Episode of Afib.    6/15- Mentation improving some, noted to be oriented x3, but w/ poor insight.     6/16- Code NAVIN called , agitated, thrashing in bed, unable to be redirected, then became unresponsive w/ agonal breathing, Code Blue Called, s/p x 4 cycles CPR, ROSC achieved. patient intubated, started on low dose of levophed.      6/18- Still intubated on vent, SBT x 3 hrs this morning, back on precedex and propofol, small dose of levophed.     6/19- failed SBT (approx 10 min) this morning 2/2 tachypnea and worsening restlessness. Still requiring levophed, Na 132.  CXR Pulmonary edema and small left pleural effusion    Dr. Pascal and I met with son and daughter Dianne in person and her daughter in [facility] via  []Other:  Mental Status: [] Normal mental status exam  [] Drowsy  [] Confused  [x]Other:  Cardiovascular: [x] Regular rate/rhythm  [] Arrhythmia  [] Other:  Chest: [x] Effort normal  []Lungs clear  [] Respiratory distress  []Tachypnea  [] Other:  Abdomen: [] Soft/non-tender  [] Normal appearance  [] Distended  [] Ascites  [x] Other:obese  Neurological: [] Normal speech  [] Normal sensation  [x]Deficits present:  Extremity: [] Normal skin color/temp  [] Clubbing/cyanosis  [] No edema  [x] Other:edema    Wt Readings from Last 15 Encounters:   06/16/25 108 kg (238 lb 1.6 oz)   05/19/23 95.3 kg (210 lb)        Current Diet: ADULT TUBE FEEDING; Orogastric; Peptide Based High Protein; Continuous; 20; Yes; 5; Q 6 hours; 35; 45; Q 3 hours; Protein; 1 Dose; TID       PSYCHOSOCIAL/SPIRITUAL SCREENING:   Palliative IDT has assessed this patient for cultural preferences / practices and a referral made as appropriate to needs (Cultural Services, Patient Advocacy, Ethics, etc.)    Spiritual Affiliation: Anglican    Any spiritual / Pentecostalism concerns:  [] Yes /  [x] No   If \"Yes\" to discuss with pastoral care during IDT     Does caregiver feel burdened by caring for their loved one:   [] Yes /  [] No /  [] No Caregiver Present/Available [x] No Caregiver [] Pt Lives at Facility  If \"Yes\" to discuss with social work during IDT    Anticipatory grief assessment:   [x] Normal  / [] Maladaptive     If \"Maladaptive\" to discuss with social work during IDT    ESAS Anxiety:      ESAS Depression:          LAB AND IMAGING FINDINGS:   Objective data reviewed:  labs, images, records, medication use, vitals, and chart     FINAL COMMENTS   Thank you for allowing Palliative Medicine to participate in the care of Malini Griggs.    Only check if applicable and billing time based rather than MDM  [x] The total encounter time on this service date was __35__ minutes which was spent performing a face-to-face encounter and personally completing the

## 2025-06-19 NOTE — PROGRESS NOTES
[facility]    Malini Griggs  YOB: 1945          Assessment & Plan:     ESRD on HD TTS at [facility]  AMS  New RUE AVF with arm edema  DM2  Anemia  Mild hyperkalemia  S/p cardiac arrest 6/16 ROSC     Rec:  Patient has HD today and 3 kg removed  On low dose levophed   HD TTS  cont [facility] TTS       Subjective:   CC: ESRD  Intubated and sedated  Had HD in am  Current Facility-Administered Medications   Medication Dose Route Frequency    insulin glargine (LANTUS) injection vial 14 Units  14 Units SubCUTAneous Daily    pantoprazole (PROTONIX) 40 mg in sodium chloride (PF) 0.9 % 10 mL injection  40 mg IntraVENous Daily    insulin lispro (HUMALOG,ADMELOG) injection vial 0-8 Units  0-8 Units SubCUTAneous Q6H    [START ON 6/24/2025] amiodarone (CORDARONE) tablet 200 mg  200 mg Orogastric Daily    ipratropium 0.5 mg-albuterol 2.5 mg (DUONEB) nebulizer solution 1 Dose  1 Dose Inhalation Q6H PRN    sevelamer (RENVELA) packet 0.8 g  0.8 g Orogastric TID WC    dexmedeTOMIDine (PRECEDEX) 400 mcg in sodium chloride 0.9 % 100 mL infusion  0.1-1.5 mcg/kg/hr IntraVENous Continuous    epoetin jay-epbx (RETACRIT) injection 10,000 Units  10,000 Units IntraVENous Once per day on Tuesday Thursday Saturday    propofol infusion  5-50 mcg/kg/min IntraVENous Continuous    norepinephrine (LEVOPHED) 16 mg in sodium chloride 0.9 % 250 mL infusion (premix)  1-100 mcg/min IntraVENous Continuous    amiodarone (CORDARONE) tablet 200 mg  200 mg Orogastric BID    fentaNYL (SUBLIMAZE) injection 50 mcg  50 mcg IntraVENous Q30 Min PRN    meclizine (ANTIVERT) tablet 12.5 mg  12.5 mg Oral TID PRN    ibuprofen (ADVIL;MOTRIN) tablet 400 mg  400 mg Oral Q6H PRN    OLANZapine (ZYPREXA) tablet 5 mg  5 mg Oral Nightly PRN    phenol 1.4 % mouth spray 1 spray  1 spray Mouth/Throat Q2H PRN    sodium chloride flush 0.9 % injection 5-40 mL  5-40 mL IntraVENous PRN    0.9 % sodium chloride infusion

## 2025-06-23 LAB
ANION GAP BLD CALC-SCNC: ABNORMAL (ref 10–20)
BASE EXCESS BLD CALC-SCNC: 1.1 MMOL/L
CA-I BLD-MCNC: 1.19 MMOL/L (ref 1.15–1.33)
CHLORIDE BLD-SCNC: 98 MMOL/L (ref 100–111)
CO2 BLD-SCNC: 25 MMOL/L (ref 22–29)
CREAT UR-MCNC: 4.1 MG/DL (ref 0.6–1.3)
GLUCOSE BLD STRIP.AUTO-MCNC: 252 MG/DL (ref 74–99)
HCO3 BLDA-SCNC: 26 MMOL/L
LACTATE BLD-SCNC: 0.95 MMOL/L (ref 0.4–2)
PCO2 BLD: 43.4 MMHG (ref 35–48)
PH BLD: 7.39 (ref 7.35–7.45)
PO2 BLD: 138 MMHG (ref 83–108)
POTASSIUM BLD-SCNC: 4.1 MMOL/L (ref 3.5–5.5)
SAO2 % BLD: 99 %
SODIUM BLD-SCNC: 135 MMOL/L (ref 136–145)
SPECIMEN SITE: ABNORMAL

## (undated) DEVICE — GLOVE ORTHO 8   MSG9480

## (undated) DEVICE — SPONGE GZ W4XL4IN COT 12 PLY TYP VII WVN C FLD DSGN STERILE

## (undated) DEVICE — Device

## (undated) DEVICE — SUTURE VCRL SZ 3-0 L27IN ABSRB UD L26MM SH 1/2 CIR J416H

## (undated) DEVICE — PART NUMBER 108260, VTI 8 MHZ DISPOSABLE DOPPLER PROBE, STRAIGHT, BOX: Brand: VTI 8 MHZ DISPOSABLE DOPPLER PROBE, STRAIGHT, BOX

## (undated) DEVICE — LOOP VES W13MM THK09MM MINI RED SIL FLD REPELLENT

## (undated) DEVICE — SUTURE PROL SZ 6-0 L18IN NONABSORBABLE BLU L9.3MM BV-1 3/8 8806H

## (undated) DEVICE — SUTURE MCRYL SZ 4-0 L27IN ABSRB UD L19MM PS-2 1/2 CIR PRIM Y426H

## (undated) DEVICE — AGENT HEMSTAT W2XL14IN OXIDIZED REGENERATED CELOS ABSRB FOR

## (undated) DEVICE — PENCIL ES CRD L10FT HND SWCHING ROCK SWCH W/ EDGE COAT BLDE

## (undated) DEVICE — SOLUTION IV 500ML 0.9% SOD CHL PH 5 INJ USP VIAFLX PLAS

## (undated) DEVICE — SUTURE PERMAHAND SZ 3-0 L30IN NONABSORBABLE BLK SILK BRAID A304H

## (undated) DEVICE — INTENT OT USE PROVIDES A STERILE INTERFACE BETWEEN THE OPERATING ROOM SURGICAL LAMPS (NON-STERILE) AND THE SURGEON OR STAFF WORKING IN THE STERILE FIELD.: Brand: ASPEN® ALC PLUS LIGHT HANDLE COVER